# Patient Record
Sex: MALE | Race: OTHER | NOT HISPANIC OR LATINO | URBAN - METROPOLITAN AREA
[De-identification: names, ages, dates, MRNs, and addresses within clinical notes are randomized per-mention and may not be internally consistent; named-entity substitution may affect disease eponyms.]

---

## 2023-04-23 ENCOUNTER — INPATIENT (INPATIENT)
Facility: HOSPITAL | Age: 88
LOS: 0 days | Discharge: LEFT BEFORE DISCHARGE | DRG: 196 | End: 2023-04-24
Attending: INTERNAL MEDICINE | Admitting: INTERNAL MEDICINE
Payer: MEDICARE

## 2023-04-23 VITALS
SYSTOLIC BLOOD PRESSURE: 99 MMHG | DIASTOLIC BLOOD PRESSURE: 44 MMHG | WEIGHT: 100.09 LBS | RESPIRATION RATE: 24 BRPM | OXYGEN SATURATION: 74 % | HEIGHT: 67 IN | TEMPERATURE: 98 F | HEART RATE: 112 BPM

## 2023-04-23 DIAGNOSIS — R09.02 HYPOXEMIA: ICD-10-CM

## 2023-04-23 LAB
ALBUMIN SERPL ELPH-MCNC: 4.1 G/DL — SIGNIFICANT CHANGE UP (ref 3.5–5)
ALP SERPL-CCNC: 75 U/L — SIGNIFICANT CHANGE UP (ref 40–120)
ALT FLD-CCNC: 15 U/L DA — SIGNIFICANT CHANGE UP (ref 10–60)
ANION GAP SERPL CALC-SCNC: 11 MMOL/L — SIGNIFICANT CHANGE UP (ref 5–17)
ANION GAP SERPL CALC-SCNC: 8 MMOL/L — SIGNIFICANT CHANGE UP (ref 5–17)
APPEARANCE UR: CLEAR — SIGNIFICANT CHANGE UP
APPEARANCE UR: CLEAR — SIGNIFICANT CHANGE UP
AST SERPL-CCNC: 14 U/L — SIGNIFICANT CHANGE UP (ref 10–40)
BACTERIA # UR AUTO: ABNORMAL /HPF
BASOPHILS # BLD AUTO: 0.03 K/UL — SIGNIFICANT CHANGE UP (ref 0–0.2)
BASOPHILS NFR BLD AUTO: 0.4 % — SIGNIFICANT CHANGE UP (ref 0–2)
BILIRUB SERPL-MCNC: 0.4 MG/DL — SIGNIFICANT CHANGE UP (ref 0.2–1.2)
BILIRUB UR-MCNC: NEGATIVE — SIGNIFICANT CHANGE UP
BILIRUB UR-MCNC: NEGATIVE — SIGNIFICANT CHANGE UP
BUN SERPL-MCNC: 105 MG/DL — HIGH (ref 7–18)
BUN SERPL-MCNC: 92 MG/DL — HIGH (ref 7–18)
CALCIUM SERPL-MCNC: 9.3 MG/DL — SIGNIFICANT CHANGE UP (ref 8.4–10.5)
CALCIUM SERPL-MCNC: 9.5 MG/DL — SIGNIFICANT CHANGE UP (ref 8.4–10.5)
CHLORIDE SERPL-SCNC: 114 MMOL/L — HIGH (ref 96–108)
CHLORIDE SERPL-SCNC: 118 MMOL/L — HIGH (ref 96–108)
CO2 SERPL-SCNC: 16 MMOL/L — LOW (ref 22–31)
CO2 SERPL-SCNC: 19 MMOL/L — LOW (ref 22–31)
COLOR SPEC: YELLOW — SIGNIFICANT CHANGE UP
COLOR SPEC: YELLOW — SIGNIFICANT CHANGE UP
COMMENT - URINE: SIGNIFICANT CHANGE UP
CREAT ?TM UR-MCNC: 117 MG/DL — SIGNIFICANT CHANGE UP
CREAT ?TM UR-MCNC: 96 MG/DL — SIGNIFICANT CHANGE UP
CREAT SERPL-MCNC: 6.18 MG/DL — HIGH (ref 0.5–1.3)
CREAT SERPL-MCNC: 6.88 MG/DL — HIGH (ref 0.5–1.3)
D DIMER BLD IA.RAPID-MCNC: 543 NG/ML DDU — HIGH
DIFF PNL FLD: ABNORMAL
DIFF PNL FLD: ABNORMAL
EGFR: 7 ML/MIN/1.73M2 — LOW
EGFR: 8 ML/MIN/1.73M2 — LOW
EOSINOPHIL # BLD AUTO: 0.03 K/UL — SIGNIFICANT CHANGE UP (ref 0–0.5)
EOSINOPHIL NFR BLD AUTO: 0.4 % — SIGNIFICANT CHANGE UP (ref 0–6)
EPI CELLS # UR: ABNORMAL /HPF
GAS PNL BLDA: SIGNIFICANT CHANGE UP
GLUCOSE BLDC GLUCOMTR-MCNC: 120 MG/DL — HIGH (ref 70–99)
GLUCOSE SERPL-MCNC: 118 MG/DL — HIGH (ref 70–99)
GLUCOSE SERPL-MCNC: 119 MG/DL — HIGH (ref 70–99)
GLUCOSE UR QL: 100 MG/DL
GLUCOSE UR QL: 50 MG/DL
GRAN CASTS # UR COMP ASSIST: ABNORMAL /LPF
HCT VFR BLD CALC: 32.9 % — LOW (ref 39–50)
HGB BLD-MCNC: 10.6 G/DL — LOW (ref 13–17)
HYALINE CASTS # UR AUTO: ABNORMAL /LPF
IMM GRANULOCYTES NFR BLD AUTO: 0.7 % — SIGNIFICANT CHANGE UP (ref 0–0.9)
KETONES UR-MCNC: ABNORMAL
KETONES UR-MCNC: ABNORMAL
LACTATE SERPL-SCNC: 2.2 MMOL/L — HIGH (ref 0.7–2)
LACTATE SERPL-SCNC: 2.4 MMOL/L — HIGH (ref 0.7–2)
LEUKOCYTE ESTERASE UR-ACNC: NEGATIVE — SIGNIFICANT CHANGE UP
LEUKOCYTE ESTERASE UR-ACNC: NEGATIVE — SIGNIFICANT CHANGE UP
LYMPHOCYTES # BLD AUTO: 0.99 K/UL — LOW (ref 1–3.3)
LYMPHOCYTES # BLD AUTO: 13.3 % — SIGNIFICANT CHANGE UP (ref 13–44)
MCHC RBC-ENTMCNC: 31.5 PG — SIGNIFICANT CHANGE UP (ref 27–34)
MCHC RBC-ENTMCNC: 32.2 GM/DL — SIGNIFICANT CHANGE UP (ref 32–36)
MCV RBC AUTO: 97.6 FL — SIGNIFICANT CHANGE UP (ref 80–100)
MONOCYTES # BLD AUTO: 0.51 K/UL — SIGNIFICANT CHANGE UP (ref 0–0.9)
MONOCYTES NFR BLD AUTO: 6.9 % — SIGNIFICANT CHANGE UP (ref 2–14)
MRSA PCR RESULT.: SIGNIFICANT CHANGE UP
NEUTROPHILS # BLD AUTO: 5.81 K/UL — SIGNIFICANT CHANGE UP (ref 1.8–7.4)
NEUTROPHILS NFR BLD AUTO: 78.3 % — HIGH (ref 43–77)
NITRITE UR-MCNC: NEGATIVE — SIGNIFICANT CHANGE UP
NITRITE UR-MCNC: NEGATIVE — SIGNIFICANT CHANGE UP
NRBC # BLD: 0 /100 WBCS — SIGNIFICANT CHANGE UP (ref 0–0)
NT-PROBNP SERPL-SCNC: 708 PG/ML — HIGH (ref 0–450)
PH UR: 5 — SIGNIFICANT CHANGE UP (ref 5–8)
PH UR: 5 — SIGNIFICANT CHANGE UP (ref 5–8)
PLATELET # BLD AUTO: 258 K/UL — SIGNIFICANT CHANGE UP (ref 150–400)
POTASSIUM SERPL-MCNC: 5.3 MMOL/L — SIGNIFICANT CHANGE UP (ref 3.5–5.3)
POTASSIUM SERPL-MCNC: 5.9 MMOL/L — HIGH (ref 3.5–5.3)
POTASSIUM SERPL-SCNC: 5.3 MMOL/L — SIGNIFICANT CHANGE UP (ref 3.5–5.3)
POTASSIUM SERPL-SCNC: 5.9 MMOL/L — HIGH (ref 3.5–5.3)
PROT ?TM UR-MCNC: 98 MG/DL — HIGH (ref 0–12)
PROT SERPL-MCNC: 9.5 G/DL — HIGH (ref 6–8.3)
PROT UR-MCNC: 100 MG/DL
PROT UR-MCNC: 100 MG/DL
PSA FLD-MCNC: 0.99 NG/ML — SIGNIFICANT CHANGE UP (ref 0–4)
RAPID RVP RESULT: SIGNIFICANT CHANGE UP
RBC # BLD: 3.37 M/UL — LOW (ref 4.2–5.8)
RBC # FLD: 14 % — SIGNIFICANT CHANGE UP (ref 10.3–14.5)
RBC CASTS # UR COMP ASSIST: ABNORMAL /HPF (ref 0–2)
S AUREUS DNA NOSE QL NAA+PROBE: SIGNIFICANT CHANGE UP
SARS-COV-2 RNA SPEC QL NAA+PROBE: SIGNIFICANT CHANGE UP
SODIUM SERPL-SCNC: 141 MMOL/L — SIGNIFICANT CHANGE UP (ref 135–145)
SODIUM SERPL-SCNC: 145 MMOL/L — SIGNIFICANT CHANGE UP (ref 135–145)
SODIUM UR-SCNC: 34 MMOL/L — SIGNIFICANT CHANGE UP
SODIUM UR-SCNC: 44 MMOL/L — SIGNIFICANT CHANGE UP
SP GR SPEC: 1.01 — SIGNIFICANT CHANGE UP (ref 1.01–1.02)
SP GR SPEC: 1.01 — SIGNIFICANT CHANGE UP (ref 1.01–1.02)
TROPONIN I, HIGH SENSITIVITY RESULT: 27.1 NG/L — SIGNIFICANT CHANGE UP
UROBILINOGEN FLD QL: NEGATIVE — SIGNIFICANT CHANGE UP
UROBILINOGEN FLD QL: NEGATIVE — SIGNIFICANT CHANGE UP
WBC # BLD: 7.42 K/UL — SIGNIFICANT CHANGE UP (ref 3.8–10.5)
WBC # FLD AUTO: 7.42 K/UL — SIGNIFICANT CHANGE UP (ref 3.8–10.5)
WBC UR QL: SIGNIFICANT CHANGE UP /HPF (ref 0–5)

## 2023-04-23 PROCEDURE — 71045 X-RAY EXAM CHEST 1 VIEW: CPT | Mod: 26

## 2023-04-23 PROCEDURE — 74176 CT ABD & PELVIS W/O CONTRAST: CPT | Mod: 26

## 2023-04-23 PROCEDURE — 93010 ELECTROCARDIOGRAM REPORT: CPT

## 2023-04-23 PROCEDURE — 99285 EMERGENCY DEPT VISIT HI MDM: CPT

## 2023-04-23 PROCEDURE — 71250 CT THORAX DX C-: CPT | Mod: 26

## 2023-04-23 RX ORDER — DEXTROSE 50 % IN WATER 50 %
50 SYRINGE (ML) INTRAVENOUS ONCE
Refills: 0 | Status: COMPLETED | OUTPATIENT
Start: 2023-04-23 | End: 2023-04-23

## 2023-04-23 RX ORDER — CHLORHEXIDINE GLUCONATE 213 G/1000ML
1 SOLUTION TOPICAL
Refills: 0 | Status: DISCONTINUED | OUTPATIENT
Start: 2023-04-23 | End: 2023-04-24

## 2023-04-23 RX ORDER — AZITHROMYCIN 500 MG/1
500 TABLET, FILM COATED ORAL EVERY 24 HOURS
Refills: 0 | Status: DISCONTINUED | OUTPATIENT
Start: 2023-04-23 | End: 2023-04-24

## 2023-04-23 RX ORDER — HEPARIN SODIUM 5000 [USP'U]/ML
5000 INJECTION INTRAVENOUS; SUBCUTANEOUS EVERY 12 HOURS
Refills: 0 | Status: DISCONTINUED | OUTPATIENT
Start: 2023-04-23 | End: 2023-04-24

## 2023-04-23 RX ORDER — CEFTRIAXONE 500 MG/1
1000 INJECTION, POWDER, FOR SOLUTION INTRAMUSCULAR; INTRAVENOUS EVERY 24 HOURS
Refills: 0 | Status: DISCONTINUED | OUTPATIENT
Start: 2023-04-23 | End: 2023-04-24

## 2023-04-23 RX ORDER — SODIUM CHLORIDE 9 MG/ML
500 INJECTION, SOLUTION INTRAVENOUS ONCE
Refills: 0 | Status: COMPLETED | OUTPATIENT
Start: 2023-04-23 | End: 2023-04-23

## 2023-04-23 RX ORDER — INSULIN HUMAN 100 [IU]/ML
5 INJECTION, SOLUTION SUBCUTANEOUS ONCE
Refills: 0 | Status: COMPLETED | OUTPATIENT
Start: 2023-04-23 | End: 2023-04-23

## 2023-04-23 RX ORDER — SODIUM CHLORIDE 9 MG/ML
1000 INJECTION INTRAMUSCULAR; INTRAVENOUS; SUBCUTANEOUS ONCE
Refills: 0 | Status: COMPLETED | OUTPATIENT
Start: 2023-04-23 | End: 2023-04-23

## 2023-04-23 RX ORDER — CALCIUM GLUCONATE 100 MG/ML
2 VIAL (ML) INTRAVENOUS ONCE
Refills: 0 | Status: COMPLETED | OUTPATIENT
Start: 2023-04-23 | End: 2023-04-23

## 2023-04-23 RX ADMIN — CHLORHEXIDINE GLUCONATE 1 APPLICATION(S): 213 SOLUTION TOPICAL at 16:28

## 2023-04-23 RX ADMIN — Medication 50 MILLILITER(S): at 13:43

## 2023-04-23 RX ADMIN — Medication 200 GRAM(S): at 13:56

## 2023-04-23 RX ADMIN — SODIUM CHLORIDE 1000 MILLILITER(S): 9 INJECTION INTRAMUSCULAR; INTRAVENOUS; SUBCUTANEOUS at 10:55

## 2023-04-23 RX ADMIN — SODIUM CHLORIDE 1000 MILLILITER(S): 9 INJECTION, SOLUTION INTRAVENOUS at 14:04

## 2023-04-23 RX ADMIN — INSULIN HUMAN 5 UNIT(S): 100 INJECTION, SOLUTION SUBCUTANEOUS at 13:43

## 2023-04-23 RX ADMIN — HEPARIN SODIUM 5000 UNIT(S): 5000 INJECTION INTRAVENOUS; SUBCUTANEOUS at 18:44

## 2023-04-23 RX ADMIN — AZITHROMYCIN 255 MILLIGRAM(S): 500 TABLET, FILM COATED ORAL at 16:24

## 2023-04-23 RX ADMIN — CEFTRIAXONE 100 MILLIGRAM(S): 500 INJECTION, POWDER, FOR SOLUTION INTRAMUSCULAR; INTRAVENOUS at 14:06

## 2023-04-23 NOTE — ED ADULT TRIAGE NOTE - CHIEF COMPLAINT QUOTE
SON REPORTS PT HAS DIFFICULTY BREATHING X 4 DAYS. + COUGH. EMS REPORTS UNABLE TO OBTAIN O2SAT IN THE FIELD

## 2023-04-23 NOTE — ED ADULT NURSE NOTE - OBJECTIVE STATEMENT
Pt presents to ED for SOB x 4 days. Received pt on O2 @ 15L/min via non rebreather mask. Pt denies any pain.

## 2023-04-23 NOTE — PATIENT PROFILE ADULT - FALL HARM RISK - HARM RISK INTERVENTIONS
Assistance with ambulation/Assistance OOB with selected safe patient handling equipment/Communicate Risk of Fall with Harm to all staff/Monitor gait and stability/Provide patient with walking aids - walker, cane, crutches/Reinforce activity limits and safety measures with patient and family/Tailored Fall Risk Interventions/Use of alarms - bed, chair and/or voice tab/Visual Cue: Yellow wristband and red socks/Bed in lowest position, wheels locked, appropriate side rails in place/Call bell, personal items and telephone in reach/Instruct patient to call for assistance before getting out of bed or chair/Non-slip footwear when patient is out of bed/[place] to call system/Physically safe environment - no spills, clutter or unnecessary equipment/Purposeful Proactive Rounding/Room/bathroom lighting operational, light cord in reach

## 2023-04-23 NOTE — CONSULT NOTE ADULT - SUBJECTIVE AND OBJECTIVE BOX
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 As per patient he does not take any medications, or have any medical history. As per ED attending, pt with productive cough to the point that he is unable to take a breath. Pt denies fever, chills, chest pain. In ED, pt reports wanting to urinate but is unable to.  Patient is endorsing weight loss over the last few weeks (2023 13:33)    INTERVAL HPI:  Called to evaluate this 88yo M admitted for respiratory distress with finding of trace Right pneumothorax and pneumomediastinum on CT scan.  Pt also in urinary retention on admission. Pt states he currently has no SOB.  He denies chest pain. He is a former smoker (quit 50 years ago). He denies history of COPD, emphysema or bronchitis. No previous surgeries. No recent trauma.       PAST MEDICAL & SURGICAL HISTORY:  No pertinent past medical history          Allergies    No Known Allergies    Intolerances        MEDICATIONS  (STANDING):  azithromycin  IVPB 500 milliGRAM(s) IV Intermittent every 24 hours  cefTRIAXone   IVPB 1000 milliGRAM(s) IV Intermittent every 24 hours  chlorhexidine 2% Cloths 1 Application(s) Topical <User Schedule>  heparin   Injectable 5000 Unit(s) SubCutaneous every 12 hours    MEDICATIONS  (PRN):      Vital Signs Last 24 Hrs  T(C): 35.7 (2023 16:00), Max: 36.7 (2023 14:00)  T(F): 96.3 (2023 16:00), Max: 98 (2023 14:00)  HR: 98 (2023 16:00) (93 - 115)  BP: 134/79 (2023 16:00) (99/44 - 134/79)  BP(mean): 91 (2023 16:00) (91 - 97)  RR: 21 (2023 16:00) (19 - 24)  SpO2: 96% (2023 16:00) (74% - 99%)    Parameters below as of 2023 16:00  Patient On (Oxygen Delivery Method): room air        Physical Exam:  Gen: awake, alert oriented NAD, cachetic  HEENT: anicteric  Chest: equal chest rise, no accessory muscle use  Abd: soft NT ND  Pelvic: Lomax catheter in place with clear urine  Ext: warm to touch no c/c/e    Labs:                          10.6   7.42  )-----------( 258      ( 2023 10:30 )             32.9         145  |  118<H>  |  92<H>  ----------------------------<  119<H>  5.3   |  19<L>  |  6.18<H>    Ca    9.3      2023 16:10    TPro  9.5<H>  /  Alb  4.1  /  TBili  0.4  /  DBili  x   /  AST  14  /  ALT  15  /  AlkPhos  75        Urinalysis Basic - ( 2023 14:10 )    Color: Yellow / Appearance: Clear / S.015 / pH: x  Gluc: x / Ketone: Small  / Bili: Negative / Urobili: Negative   Blood: x / Protein: 100 mg/dL / Nitrite: Negative   Leuk Esterase: Negative / RBC: 2-5 /HPF / WBC 0-2 /HPF   Sq Epi: x / Non Sq Epi: x / Bacteria: Few /HPF        Radiological Exams:  < from: CT Chest No Cont (23 @ 14:35) >  IMPRESSION:  Trace right pneumothorax and pneumomediastinum.    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    < end of copied text >  
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 As per patient he does not take any medications, or have any medical history. As per ED attending, pt with productive cough to the point that he is unable to take a breath. Pt denies fever, chills, chest pain. In ED, pt reports wanting to urinate but is unable to.  Patient is endorsing weight loss over the last few weeks (2023 13:33)      PAST MEDICAL & SURGICAL HISTORY:  No pertinent past medical history            Allergies:  No Known Allergies      Home Medications:   azithromycin  IVPB 500 milliGRAM(s) IV Intermittent every 24 hours  cefTRIAXone   IVPB 1000 milliGRAM(s) IV Intermittent every 24 hours  chlorhexidine 2% Cloths 1 Application(s) Topical <User Schedule>  heparin   Injectable 5000 Unit(s) SubCutaneous every 12 hours      Hospital Medications:   MEDICATIONS  (STANDING):  azithromycin  IVPB 500 milliGRAM(s) IV Intermittent every 24 hours  cefTRIAXone   IVPB 1000 milliGRAM(s) IV Intermittent every 24 hours  chlorhexidine 2% Cloths 1 Application(s) Topical <User Schedule>  heparin   Injectable 5000 Unit(s) SubCutaneous every 12 hours      SOCIAL HISTORY:  Denies ETOh, Smoking,     Family History:  FAMILY HISTORY:      ROS:  Limited  Alert and awake in mild distress on nasal cannula oxygen  Mild SOB and dry cough  Poor oral intake/ Fatigue  Difficulty swallowing  Son at bedside     VITALS:  T(F): 96.3 (23 @ 16:00), Max: 98 (23 @ 14:00)  HR: 98 (23 @ 16:00)  BP: 134/79 (23 @ 16:00)  RR: 21 (23 @ 16:00)  SpO2: 96% (23 @ 16:00)  Wt(kg): --     @ 07:01  -   @ 16:57  --------------------------------------------------------  IN: 850 mL / OUT: 150 mL / NET: 700 mL      Height (cm): 170.2 ( @ 10:08)  Weight (kg): 41.3 ( @ 14:50)  BMI (kg/m2): 14.3 ( @ 14:50)  BSA (m2): 1.45 ( @ 14:50)  CAPILLARY BLOOD GLUCOSE      POCT Blood Glucose.: 120 mg/dL (2023 14:21)  POCT Blood Glucose.: 84 mg/dL (2023 10:29)        PHYSICAL EXAM:  GENERAL: Alert, awake, cachectic appearing male oriented x2-3   HEENT: MAGALYS, EOMI, neck supple, no JVP, poor dentitions  CHEST/LUNG: Bilateral decreased breath sounds, bibasilar minimal rhonchi and crepitations, no wheezing  HEART: Regular rate and rhythm, CHANTAL II/VI at LPSB, no gallops, no rub   ABDOMEN: Soft, nontender, non distended, bowel sounds present, no palpable organomegaly  : No flank or supra pubic tenderness.  EXTREMITIES: Peripheral pulses are palpable, no pedal edema,  Musculoskeletal: No joint or spinal tenderness  Neurology: AAOx2-3, no focal neurological deficit, able to move all 4 extremities  SKIN: No rash or skin lesion    LABS:      141  |  114<H>  |  105<H>  ----------------------------<  118<H>  5.9<H>   |  16<L>  |  6.88<H>    Ca    9.5      2023 10:30    TPro  9.5<H>  /  Alb  4.1  /  TBili  0.4  /  DBili      /  AST  14  /  ALT  15  /  AlkPhos  75      Creatinine Trend: 6.88 <--                        10.6   7.42  )-----------( 258      ( 2023 10:30 )             32.9     Urine Studies:  Urinalysis Basic - ( 2023 14:10 )    Color: Yellow / Appearance: Clear / S.015 / pH:   Gluc:  / Ketone: Small  / Bili: Negative / Urobili: Negative   Blood:  / Protein: 100 mg/dL / Nitrite: Negative   Leuk Esterase: Negative / RBC: 2-5 /HPF / WBC 0-2 /HPF   Sq Epi:  / Non Sq Epi:  / Bacteria: Few /HPF      Creatinine, Random Urine: 117 mg/dL ( @ 14:10)  Sodium, Random Urine: 34 mmol/L ( @ 14:10)      RADIOLOGY & ADDITIONAL STUDIES:  rad< from: CT Abdomen and Pelvis No Cont (23 @ 14:35) >    ACC: [id number] EXAM:  CT CHEST   ORDERED BY: TYSON GTZ     ACC: [id number] EXAM:  CT ABDOMEN AND PELVIS   ORDERED BY: TYSON GTZ     PROCEDURE DATE:  2023          INTERPRETATION:  CLINICAL INFORMATION: Hypoxia. Respiratory failure.   Acuterenal failure.    COMPARISON: None.    CONTRAST/COMPLICATIONS:  IV Contrast: NONE  Oral Contrast: NONE  Complications: None reported at time of study completion    PROCEDURE:  CT of the Chest, Abdomen and Pelvis was performed.  Sagittal and coronal reformats were performed.    FINDINGS:  CHEST:  LUNGS AND LARGE AIRWAYS: Diffuse fibrotic lung changes with bilateral   peripheral and basilar reticular opacities and honeycomb cysts.  PLEURA: Trace right pneumothorax.  VESSELS: Atherosclerotic changesof the aorta.  HEART: Heart size is normal. No pericardial effusion.  MEDIASTINUM AND SHERITA: Pneumomediastinum. No lymphadenopathy.  CHEST WALL AND LOWER NECK: Within normal limits.    ABDOMEN AND PELVIS:  LIVER: Within normal limits.  BILE DUCTS: Normal caliber.  GALLBLADDER: Within normal limits.  SPLEEN: Within normal limits.  PANCREAS: Within normal limits.  ADRENALS: Within normal limits.  KIDNEYS/URETERS: Within normal limits.    BLADDER: Contains air and a Lomax catheter balloon.  REPRODUCTIVE ORGANS: Prostate is enlarged.    BOWEL: No bowel obstruction.  PERITONEUM: No ascites.  VESSELS: Atherosclerotic changes.  RETROPERITONEUM/LYMPH NODES: No lymphadenopathy.  ABDOMINAL WALL: Within normal limits.  BONES: Degenerative changes.    IMPRESSION:  Trace right pneumothorax and pneumomediastinum.    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    Findings were discussed with Dr. Duran on 2023 at 3:05 PM by Dr. Leroy   with read back confirmation.    --- End of Report ---            SYDNI MCCALL; Attending Radiologist  This document has been electronically signed. 2023  3:05PM    < end of copied text >  < from: CT Chest No Cont (23 @ 14:35) >    ACC: [id number] EXAM:  CT CHEST   ORDERED BY: TYSON GTZ     ACC: [id number] EXAM:  CT ABDOMEN AND PELVIS   ORDERED BY: TYSON GTZ     PROCEDURE DATE:  2023          INTERPRETATION:  CLINICAL INFORMATION: Hypoxia. Respiratory failure.   Acuterenal failure.    COMPARISON: None.    CONTRAST/COMPLICATIONS:  IV Contrast: NONE  Oral Contrast: NONE  Complications: None reported at time of study completion    PROCEDURE:  CT of the Chest, Abdomen and Pelvis was performed.  Sagittal and coronal reformats were performed.    FINDINGS:  CHEST:  LUNGS AND LARGE AIRWAYS: Diffuse fibrotic lung changes with bilateral   peripheral and basilar reticular opacities and honeycomb cysts.  PLEURA: Trace right pneumothorax.  VESSELS: Atherosclerotic changesof the aorta.  HEART: Heart size is normal. No pericardial effusion.  MEDIASTINUM AND SHERITA: Pneumomediastinum. No lymphadenopathy.  CHEST WALL AND LOWER NECK: Within normal limits.    ABDOMEN AND PELVIS:  LIVER: Within normal limits.  BILE DUCTS: Normal caliber.  GALLBLADDER: Within normal limits.  SPLEEN: Within normal limits.  PANCREAS: Within normal limits.  ADRENALS: Within normal limits.  KIDNEYS/URETERS: Within normal limits.    BLADDER: Contains air and a Lomax catheter balloon.  REPRODUCTIVE ORGANS: Prostate is enlarged.    BOWEL: No bowel obstruction.  PERITONEUM: No ascites.  VESSELS: Atherosclerotic changes.  RETROPERITONEUM/LYMPH NODES: No lymphadenopathy.  ABDOMINAL WALL: Within normal limits.  BONES: Degenerative changes.    IMPRESSION:  Trace right pneumothorax and pneumomediastinum.    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    Findings were discussed with Dr. Duran on 2023 at 3:05 PM by Dr. Leroy   with read back confirmation.    --- End of Report ---            SYDNI MCCALL; Attending Radiologist  This document has been electronically signed. 2023  3:05PM    < end of copied text >  < from: Xray Chest 1 View-PORTABLE IMMEDIATE (23 @ 11:06) >    ACC: [id number] EXAM:  XR CHEST PORTABLE IMMED 1V   ORDERED BY: CAMERON CARDONA     PROCEDURE DATE:  2023          INTERPRETATION:  Chest portable    CLINICAL HISTORY: Shortness of breath    COMPARISON: None available    IMPRESSION: Normal heart and mediastinum.    Diffuse reticulonodular interstitial changes are apparent. Pleural   parenchymal fibronodular residuals are seen at the apices bilaterally.   The angles are sharp. No acute bony abnormality is seen.    The findings are consistent with acute and/or chronic change. Recommend   comparison to prior radiographs for further delineation of this. If not   available then an interval follow-up radiographs may be helpful to   exclude superimposed acute on chronic change    --- End ofReport ---            DAVID HAMLIN MD; Attending Radiologist  This document has been electronically signed. 2023 11:21AM    < end of copied text >    EKG findings reviewed.    Imaging Personally Reviewed:  [x] YES  [ ] NO    Consultant(s) and primary physician Notes Reviewed:  [x] YES  [ ] NO    Care Discussed with Primary team/ Consultants/Other Providers [x] YES  [ ] NO

## 2023-04-23 NOTE — H&P ADULT - HISTORY OF PRESENT ILLNESS
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 As per patient he does not take any medications, or have any medical history. As per ED attending, pt with productive cough to the point that he is unable to take a breath. Pt denies fever, chills, chest pain. In ED, pt reports wanting to urinate but is unable to.  Patient is endorsing weight loss over the last few weeks

## 2023-04-23 NOTE — ED PROVIDER NOTE - CLINICAL SUMMARY MEDICAL DECISION MAKING FREE TEXT BOX
87-year-old male no significant past medical history presents with shortness of breath and cough for the past 4 days patient hypoxic to 74% on room air in ED.  Nonrebreather placed lungs grossly clear on exam no lower extremity edema patient with dry mucous membranes clinically dehydrated IV fluids labs chest x-ray viral swab reassess

## 2023-04-23 NOTE — H&P ADULT - ASSESSMENT
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    Assessment:  #Suspected PNA  #Acute Renal Failure  #Anemia    Plan:  Neuro:  - No issues    Cardiovascular:  - No issues    Pulmonary:   #Suspected PNA  - pt placed initially on BIPAP  - saturating well on 6L NC without signs of respiratory distress    Gastrointestinal:  #No issues    Renal:  #Acute Renal Failure  - baseline SCr wnl as per family  - P/w Cr 6 with hyperkalemia  - give 5u insulin cocktail  - Place bajwa catheter  - obtain ua, urine lytes    Infectious Diseases:  #Suspected PNA  - RLL decreased breath sounds  - CXR negative  -Start azithromycin 500mg qd + rocephin 1g qd  - Send strep ag, legionella, RVP, procalcitonin, mycoplasma igm  - tylenol prn    Heme/onc:   #Anemia  Hb 10.6 without signs of active bleeding  monitor hb    Endo:   No issues    Skin/ catheter:   Bajwa - 4/23 placed in ED  peripheral IVs    Prophylaxis:   Heparin 5000sq q12    Goals of Care:   FULL CODE    Dispo:  Admit to ICU   87M, from home, independent aox3 at baseline, 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    Assessment:  #Suspected PNA  #Acute Renal Failure  #NAGMA  #Anemia    Plan:  Neuro:  - No issues    Cardiovascular:  - No issues    Pulmonary:   #Suspected PNA  - pt placed initially on BIPAP  - saturating well on 6L NC without signs of respiratory distress    Gastrointestinal:  #No issues    Renal:  #Acute Renal Failure  - baseline SCr wnl as per family  - P/w Cr 6 with hyperkalemia  - give 5u insulin cocktail  - Place bajwa catheter  - obtain ua, urine lytes    #NAGMA  - HCO3 16 likely due to acute renal failure  - compensatory respiratory alkalosis  - off BIPAP will repeat ABG    Infectious Diseases:  #Suspected PNA  - RLL decreased breath sounds  - CXR negative  -Start azithromycin 500mg qd + rocephin 1g qd  - Send strep ag, legionella, RVP, procalcitonin, mycoplasma igm  - tylenol prn    Heme/onc:   #Anemia  Hb 10.6 without signs of active bleeding  monitor hb    Endo:   No issues    Skin/ catheter:   Bajwa - 4/23 placed in ED  peripheral IVs    Prophylaxis:   Heparin 5000sq q12    Goals of Care:   FULL CODE    Dispo:  Admit to ICU

## 2023-04-23 NOTE — H&P ADULT - NSHPREVIEWOFSYSTEMS_GEN_ALL_CORE
CONSTITUTIONAL: No fever, (+) weight loss, (+) fatigue  RESPIRATORY: No hemoptysis; (+) shortness of breath, (+) cough  CARDIOVASCULAR: No chest pain, palpitations, dizziness, or leg swelling  GASTROINTESTINAL: No abdominal pain. No nausea, vomiting, or hematemesis; No diarrhea or constipation. No melena or hematochezia.  GENITOURINARY: No dysuria or hematuria, urinary frequency  NEUROLOGICAL: No headaches, memory loss, loss of strength, numbness, or tremors  ENDOCRINE: No polyuria, polydipsia, or heat/cold intolerance  MUSKULOSKELETAL: No muscle aches, joint pains  HEME: no easy bruisability, no tender or enlarged lymph nodes  SKIN: No itching, burning, rashes, or lesions .

## 2023-04-23 NOTE — ED PROVIDER NOTE - PROGRESS NOTE DETAILS
Pt still hypoxic on NRB.  Discussed possible intubation with patient and son at bedside who said patient would not want to be intubated.  Will start Bipap, ABG ordered. pt accepted to ICU

## 2023-04-23 NOTE — CONSULT NOTE ADULT - ASSESSMENT
88yo M with pneumomediastinum possibly secondary to fibrotic lung disease.   1. No acute thoracic surgery intervention  2. Continue O2 therapy  3. D/w Dr. Teran. 
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 As per patient he does not take any medications, or have any medical history. As per ED attending, pt with productive cough to the point that he is unable to take a breath. Nephrology consult called for SANDI    Assessment:  1) Non oliguric SANDI with unknown prior baseline renal function with S cr 6.9 likely pre-renal/dehydration/poor oral intake/sepsis due to PNA vs ATN from renal hypoperfusion/rhabdomyolysis vs obstructive uropathy  2) NAGMA  3) Hyperkalemia  4) Acute hypoxic respiratory failure on nasal cannula oxygen  5) ?Pneumothorax/Hypoxemia/pulmonary fibrosis/PNA  6) Cachexia/poor oral intake/?dysphagia r/o malignancy  7) Anemia of chronic illness  8) Renal osteodystrophy    Recommend:  Critically ill in ICU on nasal cannula oxygen  Strict I/o  Avoid Nephrotoxic agents  S cr down trending from 6.9-->6.1  Monitor BMP and electrolytes every 8 to 12 hrly  NPO/Speech and bedside swallow evaluation  Check urinalysis, urine culture, Urine electrolytes as per ordered  Lomax's catheter  Septic work up  Maintain MAP>65-70 mm Hg  Adjust antibiotics as per renal dose adjustment  Oxygen supplement  Pulmonary follow up for Pneumothorax/pneumomediastinum  Work up for SANDI/CKD/Anemia as per ordered  Nutritional consult  Monitor for hypoxemia  Consider gentle IV hydration with NS@ 50-60cc/hr for 10-12 hours with watchful respiratory status  Volume status dry appearing with mild hyperkalemia  NO urgent need for RRT/HD  Obtain prior blood work if available  Further work up per primary/ICU team  Plan of care discussed with ICU/primary team/Family son at bedside  Will follow

## 2023-04-23 NOTE — ED PROVIDER NOTE - OBJECTIVE STATEMENT
87-year-old male brought in by EMS for shortness of breath and cough for the past 4 days.  As per EMS they are unable to get his saturation in the field.  Son is at bedside to provide collateral information.  Family denies any past medical history.  Patient was functional and working as of last week.  For the last 4 days patient been having worsening shortness of breath with cough productive sputum to the point today where patient was gasping for air.  Family denies fever no chest pain no lower extremity swelling no abdominal pain no nausea no vomiting no diarrhea.  Patient is endorsing difficulty swallowing as well as weight loss over the last few weeks

## 2023-04-23 NOTE — H&P ADULT - ATTENDING COMMENTS
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 CT show pulmonary fibrosis with pneumomediastinum and small right apical PTX . He was initially started on BiPaP changed to NC. NAGMA due to SANDI     Assessment:    - Acute Hypoxic Resp Failure   - Pneumomediastinum   - Small right apical PTX   - Lung Fibrosis   - Suspected PNA  - Acute Renal Failure  - NAGMA  - Anemia  - SANDI       Sugg    - Admit to ICU   - No  sedation   - O2 supp as needed   - D/C BiPaP   - Antibx   - F/U cultures   - Duonebs q6h   - Solumedrol   - Serial CXR too evaluate PTX   - Thoracic eval   - NPO   - IV antibx   - F/U cultures   - RVP and covid-19 neg   - PSA  - Monitor renal fx  - Neph eval   - SANDI work up , serum and urine lytes   - Renal sono   - Lomax cath   - Bicarb ivp   - DVT GI prophy    discussed with james Penaloza at [facility] in ER an BOBBY rodriguez

## 2023-04-23 NOTE — H&P ADULT - NSHPPHYSICALEXAM_GEN_ALL_CORE
General - NAD, sitting up in bed, well groomed  Eyes - PERRLA, EOM intact  ENT - Nonicteric sclerae, PERRLA, EOMI. Oropharynx clear. Moist mucous membranes. Conjunctivae appear well perfused.   Neck - No noticeable or palpable swelling, redness or rash around throat or on face  Lymph Nodes - No lymphadenopathy  Cardiovascular - RRR no m/r/g, no JVD, no carotid bruits  Lungs - (+) decreased breath sounds RLL  Skin - No rashes, skin warm and dry, no erythematous areas  Abdomen - Normal bowel sounds, abdomen soft and nontender  Rectal – Rectal exam not performed since no symptoms indicated blood loss.  Extremities - No edema, cyanosis or clubbing  Musculoskeletal - 5/5 strength, normal range of motion, no swollen or erythematous joints.  Neuro– Alert and oriented x 3, CN 2-12 grossly intact.

## 2023-04-24 VITALS
DIASTOLIC BLOOD PRESSURE: 56 MMHG | SYSTOLIC BLOOD PRESSURE: 117 MMHG | RESPIRATION RATE: 18 BRPM | HEART RATE: 92 BPM | OXYGEN SATURATION: 99 %

## 2023-04-24 LAB
ALBUMIN SERPL ELPH-MCNC: 3.3 G/DL — LOW (ref 3.5–5)
ALP SERPL-CCNC: 61 U/L — SIGNIFICANT CHANGE UP (ref 40–120)
ALT FLD-CCNC: 14 U/L DA — SIGNIFICANT CHANGE UP (ref 10–60)
ANION GAP SERPL CALC-SCNC: 9 MMOL/L — SIGNIFICANT CHANGE UP (ref 5–17)
AST SERPL-CCNC: 19 U/L — SIGNIFICANT CHANGE UP (ref 10–40)
BASOPHILS # BLD AUTO: 0.03 K/UL — SIGNIFICANT CHANGE UP (ref 0–0.2)
BASOPHILS NFR BLD AUTO: 0.3 % — SIGNIFICANT CHANGE UP (ref 0–2)
BILIRUB SERPL-MCNC: 0.3 MG/DL — SIGNIFICANT CHANGE UP (ref 0.2–1.2)
BUN SERPL-MCNC: 86 MG/DL — HIGH (ref 7–18)
CALCIUM SERPL-MCNC: 8.8 MG/DL — SIGNIFICANT CHANGE UP (ref 8.4–10.5)
CALCIUM SERPL-MCNC: 9.2 MG/DL — SIGNIFICANT CHANGE UP (ref 8.4–10.5)
CHLORIDE SERPL-SCNC: 117 MMOL/L — HIGH (ref 96–108)
CK SERPL-CCNC: 392 U/L — HIGH (ref 35–232)
CO2 SERPL-SCNC: 16 MMOL/L — LOW (ref 22–31)
CREAT SERPL-MCNC: 5.75 MG/DL — HIGH (ref 0.5–1.3)
EGFR: 9 ML/MIN/1.73M2 — LOW
EOSINOPHIL # BLD AUTO: 0.16 K/UL — SIGNIFICANT CHANGE UP (ref 0–0.5)
EOSINOPHIL NFR BLD AUTO: 1.9 % — SIGNIFICANT CHANGE UP (ref 0–6)
FERRITIN SERPL-MCNC: 265 NG/ML — SIGNIFICANT CHANGE UP (ref 30–400)
GLUCOSE SERPL-MCNC: 100 MG/DL — HIGH (ref 70–99)
HAV IGM SER-ACNC: SIGNIFICANT CHANGE UP
HBV CORE IGM SER-ACNC: SIGNIFICANT CHANGE UP
HBV SURFACE AG SER-ACNC: SIGNIFICANT CHANGE UP
HCT VFR BLD CALC: 25.6 % — LOW (ref 39–50)
HCV AB S/CO SERPL IA: 0.11 S/CO — SIGNIFICANT CHANGE UP (ref 0–0.99)
HCV AB SERPL-IMP: SIGNIFICANT CHANGE UP
HGB BLD-MCNC: 8.4 G/DL — LOW (ref 13–17)
HIV 1+2 AB+HIV1 P24 AG SERPL QL IA: SIGNIFICANT CHANGE UP
IMM GRANULOCYTES NFR BLD AUTO: 0.3 % — SIGNIFICANT CHANGE UP (ref 0–0.9)
IRON SATN MFR SERPL: 16 % — LOW (ref 20–55)
IRON SATN MFR SERPL: 42 UG/DL — LOW (ref 65–170)
LACTATE SERPL-SCNC: 2.3 MMOL/L — HIGH (ref 0.7–2)
LEGIONELLA AG UR QL: NEGATIVE — SIGNIFICANT CHANGE UP
LYMPHOCYTES # BLD AUTO: 0.98 K/UL — LOW (ref 1–3.3)
LYMPHOCYTES # BLD AUTO: 11.4 % — LOW (ref 13–44)
MAGNESIUM SERPL-MCNC: 2.2 MG/DL — SIGNIFICANT CHANGE UP (ref 1.6–2.6)
MCHC RBC-ENTMCNC: 31.7 PG — SIGNIFICANT CHANGE UP (ref 27–34)
MCHC RBC-ENTMCNC: 32.8 GM/DL — SIGNIFICANT CHANGE UP (ref 32–36)
MCV RBC AUTO: 96.6 FL — SIGNIFICANT CHANGE UP (ref 80–100)
MONOCYTES # BLD AUTO: 0.79 K/UL — SIGNIFICANT CHANGE UP (ref 0–0.9)
MONOCYTES NFR BLD AUTO: 9.2 % — SIGNIFICANT CHANGE UP (ref 2–14)
NEUTROPHILS # BLD AUTO: 6.64 K/UL — SIGNIFICANT CHANGE UP (ref 1.8–7.4)
NEUTROPHILS NFR BLD AUTO: 76.9 % — SIGNIFICANT CHANGE UP (ref 43–77)
NRBC # BLD: 0 /100 WBCS — SIGNIFICANT CHANGE UP (ref 0–0)
PHOSPHATE SERPL-MCNC: 3.7 MG/DL — SIGNIFICANT CHANGE UP (ref 2.5–4.5)
PHOSPHATE SERPL-MCNC: 3.7 MG/DL — SIGNIFICANT CHANGE UP (ref 2.5–4.5)
PLATELET # BLD AUTO: 191 K/UL — SIGNIFICANT CHANGE UP (ref 150–400)
POTASSIUM SERPL-MCNC: 5.1 MMOL/L — SIGNIFICANT CHANGE UP (ref 3.5–5.3)
POTASSIUM SERPL-SCNC: 5.1 MMOL/L — SIGNIFICANT CHANGE UP (ref 3.5–5.3)
PROT SERPL-MCNC: 7.6 G/DL — SIGNIFICANT CHANGE UP (ref 6–8.3)
PROT SERPL-MCNC: 7.8 G/DL — SIGNIFICANT CHANGE UP (ref 6–8.3)
PROT SERPL-MCNC: 7.8 G/DL — SIGNIFICANT CHANGE UP (ref 6–8.3)
PSA FLD-MCNC: 1.76 NG/ML — SIGNIFICANT CHANGE UP (ref 0–4)
PTH-INTACT FLD-MCNC: 293 PG/ML — HIGH (ref 15–65)
RBC # BLD: 2.65 M/UL — LOW (ref 4.2–5.8)
RBC # FLD: 13.7 % — SIGNIFICANT CHANGE UP (ref 10.3–14.5)
S PNEUM AG UR QL: NEGATIVE — SIGNIFICANT CHANGE UP
SODIUM SERPL-SCNC: 142 MMOL/L — SIGNIFICANT CHANGE UP (ref 135–145)
TIBC SERPL-MCNC: 254 UG/DL — SIGNIFICANT CHANGE UP (ref 250–450)
UIBC SERPL-MCNC: 212 UG/DL — SIGNIFICANT CHANGE UP (ref 110–370)
UUN UR-MCNC: 713 MG/DL — SIGNIFICANT CHANGE UP
VIT D25+D1,25 OH+D1,25 PNL SERPL-MCNC: 8.9 PG/ML — LOW (ref 19.9–79.3)
WBC # BLD: 8.63 K/UL — SIGNIFICANT CHANGE UP (ref 3.8–10.5)
WBC # FLD AUTO: 8.63 K/UL — SIGNIFICANT CHANGE UP (ref 3.8–10.5)

## 2023-04-24 PROCEDURE — 84155 ASSAY OF PROTEIN SERUM: CPT

## 2023-04-24 PROCEDURE — 83521 IG LIGHT CHAINS FREE EACH: CPT

## 2023-04-24 PROCEDURE — 87641 MR-STAPH DNA AMP PROBE: CPT

## 2023-04-24 PROCEDURE — 81001 URINALYSIS AUTO W/SCOPE: CPT

## 2023-04-24 PROCEDURE — 84100 ASSAY OF PHOSPHORUS: CPT

## 2023-04-24 PROCEDURE — 93005 ELECTROCARDIOGRAM TRACING: CPT

## 2023-04-24 PROCEDURE — 86334 IMMUNOFIX E-PHORESIS SERUM: CPT

## 2023-04-24 PROCEDURE — 71250 CT THORAX DX C-: CPT | Mod: MA

## 2023-04-24 PROCEDURE — 82570 ASSAY OF URINE CREATININE: CPT

## 2023-04-24 PROCEDURE — 80074 ACUTE HEPATITIS PANEL: CPT

## 2023-04-24 PROCEDURE — 96374 THER/PROPH/DIAG INJ IV PUSH: CPT

## 2023-04-24 PROCEDURE — 82652 VIT D 1 25-DIHYDROXY: CPT

## 2023-04-24 PROCEDURE — 82330 ASSAY OF CALCIUM: CPT

## 2023-04-24 PROCEDURE — 82962 GLUCOSE BLOOD TEST: CPT

## 2023-04-24 PROCEDURE — 83735 ASSAY OF MAGNESIUM: CPT

## 2023-04-24 PROCEDURE — 86738 MYCOPLASMA ANTIBODY: CPT

## 2023-04-24 PROCEDURE — G0103: CPT

## 2023-04-24 PROCEDURE — 83970 ASSAY OF PARATHORMONE: CPT

## 2023-04-24 PROCEDURE — 99285 EMERGENCY DEPT VISIT HI MDM: CPT | Mod: 25

## 2023-04-24 PROCEDURE — 84156 ASSAY OF PROTEIN URINE: CPT

## 2023-04-24 PROCEDURE — 84300 ASSAY OF URINE SODIUM: CPT

## 2023-04-24 PROCEDURE — 71045 X-RAY EXAM CHEST 1 VIEW: CPT

## 2023-04-24 PROCEDURE — 74176 CT ABD & PELVIS W/O CONTRAST: CPT | Mod: MA

## 2023-04-24 PROCEDURE — 0225U NFCT DS DNA&RNA 21 SARSCOV2: CPT

## 2023-04-24 PROCEDURE — 87040 BLOOD CULTURE FOR BACTERIA: CPT

## 2023-04-24 PROCEDURE — 82728 ASSAY OF FERRITIN: CPT

## 2023-04-24 PROCEDURE — 83605 ASSAY OF LACTIC ACID: CPT

## 2023-04-24 PROCEDURE — 94640 AIRWAY INHALATION TREATMENT: CPT

## 2023-04-24 PROCEDURE — 82803 BLOOD GASES ANY COMBINATION: CPT

## 2023-04-24 PROCEDURE — 85025 COMPLETE CBC W/AUTO DIFF WBC: CPT

## 2023-04-24 PROCEDURE — 36415 COLL VENOUS BLD VENIPUNCTURE: CPT

## 2023-04-24 PROCEDURE — 80053 COMPREHEN METABOLIC PANEL: CPT

## 2023-04-24 PROCEDURE — 82550 ASSAY OF CK (CPK): CPT

## 2023-04-24 PROCEDURE — 83550 IRON BINDING TEST: CPT

## 2023-04-24 PROCEDURE — 83540 ASSAY OF IRON: CPT

## 2023-04-24 PROCEDURE — 84484 ASSAY OF TROPONIN QUANT: CPT

## 2023-04-24 PROCEDURE — 83935 ASSAY OF URINE OSMOLALITY: CPT

## 2023-04-24 PROCEDURE — 96375 TX/PRO/DX INJ NEW DRUG ADDON: CPT

## 2023-04-24 PROCEDURE — 82310 ASSAY OF CALCIUM: CPT

## 2023-04-24 PROCEDURE — 87640 STAPH A DNA AMP PROBE: CPT

## 2023-04-24 PROCEDURE — 80048 BASIC METABOLIC PNL TOTAL CA: CPT

## 2023-04-24 PROCEDURE — 87449 NOS EACH ORGANISM AG IA: CPT

## 2023-04-24 PROCEDURE — 84132 ASSAY OF SERUM POTASSIUM: CPT

## 2023-04-24 PROCEDURE — 84165 PROTEIN E-PHORESIS SERUM: CPT

## 2023-04-24 PROCEDURE — 83880 ASSAY OF NATRIURETIC PEPTIDE: CPT

## 2023-04-24 PROCEDURE — 84540 ASSAY OF URINE/UREA-N: CPT

## 2023-04-24 PROCEDURE — 85379 FIBRIN DEGRADATION QUANT: CPT

## 2023-04-24 PROCEDURE — 84295 ASSAY OF SERUM SODIUM: CPT

## 2023-04-24 PROCEDURE — 87899 AGENT NOS ASSAY W/OPTIC: CPT

## 2023-04-24 PROCEDURE — 87389 HIV-1 AG W/HIV-1&-2 AB AG IA: CPT

## 2023-04-24 RX ORDER — ALBUTEROL 90 UG/1
2 AEROSOL, METERED ORAL
Qty: 1 | Refills: 0
Start: 2023-04-24 | End: 2023-05-23

## 2023-04-24 RX ORDER — ALBUTEROL 90 UG/1
2 AEROSOL, METERED ORAL ONCE
Refills: 0 | Status: COMPLETED | OUTPATIENT
Start: 2023-04-24 | End: 2023-04-24

## 2023-04-24 RX ORDER — TIOTROPIUM BROMIDE 18 UG/1
2 CAPSULE ORAL; RESPIRATORY (INHALATION)
Qty: 1 | Refills: 0
Start: 2023-04-24 | End: 2023-05-23

## 2023-04-24 RX ORDER — IPRATROPIUM/ALBUTEROL SULFATE 18-103MCG
3 AEROSOL WITH ADAPTER (GRAM) INHALATION EVERY 6 HOURS
Refills: 0 | Status: DISCONTINUED | OUTPATIENT
Start: 2023-04-24 | End: 2023-04-24

## 2023-04-24 RX ORDER — TIOTROPIUM BROMIDE 18 UG/1
2 CAPSULE ORAL; RESPIRATORY (INHALATION) DAILY
Refills: 0 | Status: COMPLETED | OUTPATIENT
Start: 2023-04-24 | End: 2023-04-24

## 2023-04-24 RX ORDER — TIOTROPIUM BROMIDE 18 UG/1
2 CAPSULE ORAL; RESPIRATORY (INHALATION) DAILY
Refills: 0 | Status: DISCONTINUED | OUTPATIENT
Start: 2023-04-24 | End: 2023-04-24

## 2023-04-24 RX ORDER — BUDESONIDE AND FORMOTEROL FUMARATE DIHYDRATE 160; 4.5 UG/1; UG/1
2 AEROSOL RESPIRATORY (INHALATION)
Qty: 1 | Refills: 0
Start: 2023-04-24 | End: 2023-05-23

## 2023-04-24 RX ORDER — BUDESONIDE AND FORMOTEROL FUMARATE DIHYDRATE 160; 4.5 UG/1; UG/1
2 AEROSOL RESPIRATORY (INHALATION)
Refills: 0 | Status: DISCONTINUED | OUTPATIENT
Start: 2023-04-24 | End: 2023-04-24

## 2023-04-24 RX ADMIN — TIOTROPIUM BROMIDE 2 PUFF(S): 18 CAPSULE ORAL; RESPIRATORY (INHALATION) at 13:34

## 2023-04-24 RX ADMIN — HEPARIN SODIUM 5000 UNIT(S): 5000 INJECTION INTRAVENOUS; SUBCUTANEOUS at 05:01

## 2023-04-24 RX ADMIN — ALBUTEROL 2 PUFF(S): 90 AEROSOL, METERED ORAL at 13:32

## 2023-04-24 RX ADMIN — Medication 40 MILLIGRAM(S): at 12:35

## 2023-04-24 NOTE — PROGRESS NOTE ADULT - ASSESSMENT
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 As per patient he does not take any medications, or have any medical history. As per ED attending, pt with productive cough to the point that he is unable to take a breath. Nephrology consult called for SANDI    Assessment:  1) Non oliguric SANDI with unknown prior baseline renal function with S cr 5.7 likely pre-renal/dehydration/poor oral intake/sepsis due to PNA vs ATN from renal hypoperfusion/rhabdomyolysis vs obstructive uropathy  2) NAGMA  3) Hyperkalemia with improvement  4) Acute hypoxic respiratory failure on nasal cannula oxygen  5) ?Pneumothorax/Hypoxemia/pulmonary fibrosis/PNA  6) Cachexia/poor oral intake/?dysphagia r/o malignancy  7) Anemia of chronic illness  8) Renal osteodystrophy    Recommend:  Critically ill in ICU on nasal cannula oxygen  Strict I/o  Avoid Nephrotoxic agents  S cr down trending 5.9  Monitor BMP and electrolytes every 12 hrly  Speech and bedside swallow evaluation  Monitor urine output  Maintain MAP>65-70 mm Hg  Adjust antibiotics as per renal dose adjustment on IV Rocephin/Zithromax  Oxygen supplement  Pulmonary follow up for Pneumothorax/pneumomediastinum  Work up for SANDI/CKD/Anemia as per ordered in progress  Nutritional consult  Volume status and electrolytes noted  No urgent need for RRT/HD  Further work up per primary/ICU team  Plan of care discussed with ICU/primary team/Family son at bedside  Will follow

## 2023-04-24 NOTE — DISCHARGE NOTE PROVIDER - NSDCMRMEDTOKEN_GEN_ALL_CORE_FT
Albuterol (Eqv-ProAir HFA) 90 mcg/inh inhalation aerosol: 2 puff(s) inhaled every 6 hours  budesonide-formoterol 80 mcg-4.5 mcg/inh inhalation aerosol: 2 inhaled once a day  predniSONE 20 mg oral tablet: 1 tab(s) orally once a day   Albuterol (Eqv-ProAir HFA) 90 mcg/inh inhalation aerosol: 2 puff(s) inhaled every 6 hours  budesonide-formoterol 80 mcg-4.5 mcg/inh inhalation aerosol: 2 inhaled once a day  predniSONE 20 mg oral tablet: 1 tab(s) orally once a day  tiotropium 2.5 mcg/inh inhalation aerosol: 2 puff(s) inhaled once a day

## 2023-04-24 NOTE — PROGRESS NOTE ADULT - SUBJECTIVE AND OBJECTIVE BOX
INTERVAL HPI/OVERNIGHT EVENTS: No acute events overnight.     PRESSORS: [ ] YES [X] NO    Antimicrobial:  azithromycin  IVPB 500 milliGRAM(s) IV Intermittent every 24 hours  cefTRIAXone   IVPB 1000 milliGRAM(s) IV Intermittent every 24 hours    Cardiovascular:    Pulmonary:    Hematalogic:  heparin   Injectable 5000 Unit(s) SubCutaneous every 12 hours    Other:  chlorhexidine 2% Cloths 1 Application(s) Topical <User Schedule>    azithromycin  IVPB 500 milliGRAM(s) IV Intermittent every 24 hours  cefTRIAXone   IVPB 1000 milliGRAM(s) IV Intermittent every 24 hours  chlorhexidine 2% Cloths 1 Application(s) Topical <User Schedule>  heparin   Injectable 5000 Unit(s) SubCutaneous every 12 hours    Drug Dosing Weight  Height (cm): 170.2 (2023 10:08)  Weight (kg): 41.3 (2023 14:50)  BMI (kg/m2): 14.3 (2023 14:50)  BSA (m2): 1.45 (2023 14:50)    CENTRAL LINE: [ ] YES [X] NO  LOCATION:   DATE INSERTED:  REMOVE: [ ] YES [ ] NO  EXPLAIN:    BASS: [ ] YES [X] NO    DATE INSERTED:  REMOVE:  [ ] YES [ ] NO  EXPLAIN:    A-LINE:  [ ] YES [X] NO  LOCATION:   DATE INSERTED:  REMOVE:  [ ] YES [ ] NO  EXPLAIN:    PMH -reviewed admission note, no change since admission      ABG - ( 2023 11:28 )  pH, Arterial: 7.24  pH, Blood: x     /  pCO2: 31    /  pO2: 147   / HCO3: 13    / Base Excess: -12.8 /  SaO2: 100                    @ 07:01  -   @ 07:00  --------------------------------------------------------  IN: 1300 mL / OUT: 815 mL / NET: 485 mL            PHYSICAL EXAM:    General - NAD, sitting up in bed, well groomed  Eyes - PERRLA, EOM intact  ENT - Nonicteric sclerae, PERRLA, EOMI. Oropharynx clear. Moist mucous membranes. Conjunctivae appear well perfused.   Neck - No noticeable or palpable swelling, redness or rash around throat or on face  Lymph Nodes - No lymphadenopathy  Cardiovascular - RRR no m/r/g, no JVD, no carotid bruits  Lungs - (+) decreased breath sounds RLL  Skin - No rashes, skin warm and dry, no erythematous areas  Abdomen - Normal bowel sounds, abdomen soft and nontender  Rectal – Rectal exam not performed since no symptoms indicated blood loss.  Extremities - No edema, cyanosis or clubbing  Musculoskeletal - 5/5 strength, normal range of motion, no swollen or erythematous joints.  Neuro– Alert and oriented x 3, CN 2-12 grossly intact.      LABS:  CBC Full  -  ( 2023 03:59 )  WBC Count : 8.63 K/uL  RBC Count : 2.65 M/uL  Hemoglobin : 8.4 g/dL  Hematocrit : 25.6 %  Platelet Count - Automated : 191 K/uL  Mean Cell Volume : 96.6 fl  Mean Cell Hemoglobin : 31.7 pg  Mean Cell Hemoglobin Concentration : 32.8 gm/dL  Auto Neutrophil # : 6.64 K/uL  Auto Lymphocyte # : 0.98 K/uL  Auto Monocyte # : 0.79 K/uL  Auto Eosinophil # : 0.16 K/uL  Auto Basophil # : 0.03 K/uL  Auto Neutrophil % : 76.9 %  Auto Lymphocyte % : 11.4 %  Auto Monocyte % : 9.2 %  Auto Eosinophil % : 1.9 %  Auto Basophil % : 0.3 %        142  |  117<H>  |  86<H>  ----------------------------<  100<H>  5.1   |  16<L>  |  5.75<H>    Ca    8.8      2023 03:59  Phos  3.7     -  Mg     2.2     -24    TPro  7.6  /  Alb  3.3<L>  /  TBili  0.3  /  DBili  x   /  AST  19  /  ALT  14  /  AlkPhos  61  -24      Urinalysis Basic - ( 2023 18:20 )    Color: Yellow / Appearance: Clear / S.015 / pH: x  Gluc: x / Ketone: Trace  / Bili: Negative / Urobili: Negative   Blood: x / Protein: 100 mg/dL / Nitrite: Negative   Leuk Esterase: Negative / RBC: 0-2 /HPF / WBC 0-2 /HPF   Sq Epi: x / Non Sq Epi: x / Bacteria: x          RADIOLOGY & ADDITIONAL STUDIES REVIEWED:  ***    [ ]GOALS OF CARE DISCUSSION WITH PATIENT/FAMILY/PROXY:    CRITICAL CARE TIME SPENT: 35 minutes

## 2023-04-24 NOTE — DISCHARGE NOTE PROVIDER - NSDCCPCAREPLAN_GEN_ALL_CORE_FT
PRINCIPAL DISCHARGE DIAGNOSIS  Diagnosis: Hypoxia  Assessment and Plan of Treatment: You came into the hospital with concerns for pneumonia causing your shortness of breath. You had a CT scan done which showed fibrosis along with a penumothorax. You decided to leave against medical advice.   Please take the inhalers as prescribed and steroids. Please follow up with your primary care physician in one week to inform them of your recent hospitalization and further management of your medical conditions.        SECONDARY DISCHARGE DIAGNOSES  Diagnosis: SANDI (acute kidney injury)  Assessment and Plan of Treatment: You came into the hospital and has kidney function that has been worsening. Please continue to follow up with a primary care doctor. You had a bajwa placed in the icu but you did not want to wait to see if you were able to void on your own. Please go to an ED if you do no urinate in the next 8-10 hours.   Please follow up with your primary care physician in one week to inform them of your recent hospitalization and further management of your medical conditions.

## 2023-04-24 NOTE — DISCHARGE NOTE NURSING/CASE MANAGEMENT/SOCIAL WORK - PATIENT PORTAL LINK FT
You can access the FollowMyHealth Patient Portal offered by [facility] by registering at the following website: [email or web address]. By joining [facility]’s FollowMyHealth portal, you will also be able to view your health information using other applications (apps) compatible with our system.

## 2023-04-24 NOTE — DISCHARGE NOTE NURSING/CASE MANAGEMENT/SOCIAL WORK - NSDCPEFALRISK_GEN_ALL_CORE
For information on Fall & Injury Prevention, visit: [email or web address] OR  [email or web address] OR  https://www.cdc.gov/steadi/patient.html

## 2023-04-24 NOTE — DISCHARGE NOTE PROVIDER - HOSPITAL COURSE
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 As per patient he does not take any medications, or have any medical history. As per ED attending, pt with productive cough to the point that he is unable to take a breath. Pt denies fever, chills, chest pain. In ED, pt reports wanting to urinate but is unable to.  Patient is endorsing weight loss over the last few weeks. Pt was brought to the icu for closer monitoring. He improved with antibiotics but before the treatment could be finished pt decided to leave AMA.   Patient wants to sign out AMA. Risk and complication related to leaving against medical advised described to patient in detail with use of . patient understands risk and yet wants to leave hospital.   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 As per patient he does not take any medications, or have any medical history. As per ED attending, pt with productive cough to the point that he is unable to take a breath. Pt denies fever, chills, chest pain. In ED, pt reports wanting to urinate but is unable to.  Patient is endorsing weight loss over the last few weeks. Pt was brought to the icu for closer monitoring. He improved with antibiotics but before the treatment could be finished pt decided to leave AMA. Pt had bajwa placed in the ED, requested the bajwa be taken out but would not like to wait to see if he voids on his own. Risks and complications were explained to him and was advised to f/u in the ED if he has not voided in 8-10 hours.   Patient wants to sign out AMA. Risk and complication related to leaving against medical advised described to patient in detail with use of . patient understands risk and yet wants to leave hospital.   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 As per patient he does not take any medications, or have any medical history. As per ED attending, pt with productive cough to the point that he is unable to take a breath. Pt denies fever, chills, chest pain. In ED, pt reports wanting to urinate but is unable to.  Patient is endorsing weight loss over the last few weeks. Pt was brought to the icu for closer monitoring. He improved with antibiotics but before the treatment could be finished pt decided to leave AMA. Pt had bajwa placed in the ED, requested the bajwa be taken out but would not like to wait to see if he voids on his own. Risks and complications were explained to him and was advised to f/u in the ED if he has not voided in 8-10 hours.   Patient wants to sign out AMA. Risk and complication related to leaving against medical advised described to patient in detail with use of . patient understands risk and yet wants to leave hospital.  for a full account of hospital course please refer to actual medical records for this is a brief summery

## 2023-04-24 NOTE — DISCHARGE NOTE PROVIDER - CARE PROVIDER_API CALL
Rafael Bradshaw)  Internal Medicine  [address] [facility] [address] [facility], [address]  [place] [address]  Phone: [phone]  Fax: [phone]  Follow Up Time:

## 2023-04-24 NOTE — PROGRESS NOTE ADULT - SUBJECTIVE AND OBJECTIVE BOX
Rafael Bradshaw MD (Nephrology progress note)  [address], [facility],  [address],  [facility]  TEl: [phone]  Cell: [phone]    Patient is a 87y Male seen and evaluated at bedside. Vital signs, laboratory data, imaging studies, consult notes reviewed done within past 24 hours. Overnight events noted.    Allergies    No Known Allergies    Intolerances        ROS:  CONSTITUTIONAL: No fever or chills.  HEENT: No headcahe or visual disturbances.  RESPIRATORY: No shortness of breath, cough, hemoptysis or wheezing  CARDIOVASCULAR: No Chest pain, shortness of breath, palpitations, dizziness, syncope, orthopnea, PND or leg swelling.  GASTROINTESTINAL: No abdominal pain, nausea, vomiting, diarrhea, hematemesis or blood per rectum.  GENITOURINARY: No urinary frequency, urgency, gross hematuria, dysuria, flank or supra pubic pain, difficulty passing urine.  NEUROLOGICAL: No headache, focal limb weakness, tingling or numbness or seizure like activity  SKIN: No skin rash or lesion  MUSCULOSKELETAL: No leg pain, calf pain or swelling    VITALS:    T(C): 36.1 (23 @ 08:00), Max: 36.7 (23 @ 14:00)  HR: 92 (23 @ 10:00) (80 - 115)  BP: 116/51 (23 @ 10:00) (99/69 - 134/79)  RR: 19 (23 @ 10:00) (14 - 25)  SpO2: 94% (23 @ 10:00) (93% - 100%)  CAPILLARY BLOOD GLUCOSE      POCT Blood Glucose.: 120 mg/dL (2023 14:21)      23 @ 07:01  -  23 @ 07:00  --------------------------------------------------------  IN: 1300 mL / OUT: 815 mL / NET: 485 mL    23 @ 07:01  -  23 @ 11:02  --------------------------------------------------------  IN: 300 mL / OUT: 85 mL / NET: 215 mL      MEDICATIONS  (STANDING):  albuterol/ipratropium for Nebulization 3 milliLiter(s) Nebulizer every 6 hours  azithromycin  IVPB 500 milliGRAM(s) IV Intermittent every 24 hours  cefTRIAXone   IVPB 1000 milliGRAM(s) IV Intermittent every 24 hours  chlorhexidine 2% Cloths 1 Application(s) Topical <User Schedule>  heparin   Injectable 5000 Unit(s) SubCutaneous every 12 hours  methylPREDNISolone sodium succinate Injectable 40 milliGRAM(s) IV Push every 6 hours    MEDICATIONS  (PRN):      PHYSICAL EXAM:  GENERAL: Alert, awake and oriented x3 in no distress  HEENT: MAGALYS, EOMI, neck supple, no JVP, no carotid bruit, oral mucosa moist and pink.  CHEST/LUNG: Bilateral clear breath sounds, no rales, no crepitations, no rhonchi, no wheezing  HEART: Regular rate and rhythm, CHANTAL II/VI at LPSB, no gallops, no rub   ABDOMEN: Soft, nontender, non distended, bowel sounds present, no palpable organomegaly  : No flank or supra pubic tenderness.  EXTREMITIES: Peripheral pulses are palpable, no pedal edema  Neurology: AAOx3, no focal neurological deficit  SKIN: No rash or skin lesion  Musculoskeletal: No joint deformity or spinal tenderness.      Vascular ACCESS:     LABS:                        8.4    8.63  )-----------( 191      ( 2023 03:59 )             25.6     04-24    142  |  117<H>  |  86<H>  ----------------------------<  100<H>  5.1   |  16<L>  |  5.75<H>    Ca    8.8      2023 03:59  Phos  3.7     04-24  Mg     2.2     04-24    TPro  7.6  /  Alb  3.3<L>  /  TBili  0.3  /  DBili  x   /  AST  19  /  ALT  14  /  AlkPhos  61  04-24        Urinalysis Basic - ( 2023 18:20 )    Color: Yellow / Appearance: Clear / S.015 / pH: x  Gluc: x / Ketone: Trace  / Bili: Negative / Urobili: Negative   Blood: x / Protein: 100 mg/dL / Nitrite: Negative   Leuk Esterase: Negative / RBC: 0-2 /HPF / WBC 0-2 /HPF   Sq Epi: x / Non Sq Epi: x / Bacteria: x      Sodium, Random Urine: 44 mmol/L ( @ 18:20)  Creatinine, Random Urine: 96 mg/dL ( @ 18:20)  Creatinine, Random Urine: 117 mg/dL ( @ 14:10)  Sodium, Random Urine: 34 mmol/L ( @ 14:10)        RADIOLOGY & ADDITIONAL STUDIES:    Imaging Personally Reviewed:  [x] YES  [ ] NO    Consultant(s) Notes Reviewed:  [x] YES  [ ] NO    Care Discussed with Primary team/ Other Providers [x] YES  [ ] NO       Rafael Bradshaw MD (Nephrology progress note)  , [facility] [place],  [address],  [facility]  TEl: [phone]  Cell: [phone]    Patient is a 87y Male seen and evaluated at bedside. Vital signs, laboratory data, imaging studies, consult notes reviewed done within past 24 hours. Overnight events noted. Patient lying in bed requesting to go home and does not want to stay at the hospital. 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 Son at the bedside. Interval improving renal function with non oliguria. Able to tolerate po intake      Allergies    No Known Allergies    Intolerances        ROS:  Limited  Alert and awake but slow to respond  Denies any chest pain, SOB, palpitations  Denies any abdominal pain, nausea, vomiting    VITALS:    T(C): 36.1 (23 @ 08:00), Max: 36.7 (23 @ 14:00)  HR: 92 (23 @ 10:00) (80 - 115)  BP: 116/51 (23 @ 10:00) (99/69 - 134/79)  RR: 19 (23 @ 10:00) (14 - 25)  SpO2: 94% (23 @ 10:00) (93% - 100%)  CAPILLARY BLOOD GLUCOSE      POCT Blood Glucose.: 120 mg/dL (2023 14:21)      23 @ 07:01  -  23 @ 07:00  --------------------------------------------------------  IN: 1300 mL / OUT: 815 mL / NET: 485 mL    23 @ 07:01  -  23 @ 11:02  --------------------------------------------------------  IN: 300 mL / OUT: 85 mL / NET: 215 mL      MEDICATIONS  (STANDING):  albuterol/ipratropium for Nebulization 3 milliLiter(s) Nebulizer every 6 hours  azithromycin  IVPB 500 milliGRAM(s) IV Intermittent every 24 hours  cefTRIAXone   IVPB 1000 milliGRAM(s) IV Intermittent every 24 hours  chlorhexidine 2% Cloths 1 Application(s) Topical <User Schedule>  heparin   Injectable 5000 Unit(s) SubCutaneous every 12 hours  methylPREDNISolone sodium succinate Injectable 40 milliGRAM(s) IV Push every 6 hours    MEDICATIONS  (PRN):      PHYSICAL EXAM:  GENERAL: Alert, awake and oriented x2-3 in no distress  HEENT: MAGALYS, EOMI, neck supple, no JVP  CHEST/LUNG: Bilateral clear breath sounds, no rales, no crepitations, no rhonchi, no wheezing  HEART: Regular rate and rhythm, CHANTAL II/VI at LPSB, no gallops, no rub   ABDOMEN: Soft, nontender, non distended, bowel sounds present, no palpable organomegaly  : No flank or supra pubic tenderness.  EXTREMITIES: Peripheral pulses are palpable, no pedal edema  Neurology: AAOx3, no focal neurological deficit  SKIN: No rash or skin lesion  Musculoskeletal: No joint deformity      Vascular ACCESS: None    LABS:                        8.4    8.63  )-----------( 191      ( 2023 03:59 )             25.6     04-24    142  |  117<H>  |  86<H>  ----------------------------<  100<H>  5.1   |  16<L>  |  5.75<H>    Ca    8.8      2023 03:59  Phos  3.7     -  Mg     2.2     -24    TPro  7.6  /  Alb  3.3<L>  /  TBili  0.3  /  DBili  x   /  AST  19  /  ALT  14  /  AlkPhos  61  -24        Urinalysis Basic - ( 2023 18:20 )    Color: Yellow / Appearance: Clear / S.015 / pH: x  Gluc: x / Ketone: Trace  / Bili: Negative / Urobili: Negative   Blood: x / Protein: 100 mg/dL / Nitrite: Negative   Leuk Esterase: Negative / RBC: 0-2 /HPF / WBC 0-2 /HPF   Sq Epi: x / Non Sq Epi: x / Bacteria: x      Sodium, Random Urine: 44 mmol/L ( @ 18:20)  Creatinine, Random Urine: 96 mg/dL ( @ 18:20)  Creatinine, Random Urine: 117 mg/dL ( @ 14:10)  Sodium, Random Urine: 34 mmol/L ( @ 14:10)        RADIOLOGY & ADDITIONAL STUDIES:  < from: CT Abdomen and Pelvis No Cont (23 @ 14:35) >    ACC: [id number] EXAM:  CT CHEST   ORDERED BY: TYSON GTZ     ACC: [id number] EXAM:  CT ABDOMEN AND PELVIS   ORDERED BY: TYSON GTZ     PROCEDURE DATE:  2023          INTERPRETATION:  CLINICAL INFORMATION: Hypoxia. Respiratory failure.   Acuterenal failure.    COMPARISON: None.    CONTRAST/COMPLICATIONS:  IV Contrast: NONE  Oral Contrast: NONE  Complications: None reported at time of study completion    PROCEDURE:  CT of the Chest, Abdomen and Pelvis was performed.  Sagittal and coronal reformats were performed.    FINDINGS:  CHEST:  LUNGS AND LARGE AIRWAYS: Diffuse fibrotic lung changes with bilateral   peripheral and basilar reticular opacities and honeycomb cysts.  PLEURA: Trace right pneumothorax.  VESSELS: Atherosclerotic changesof the aorta.  HEART: Heart size is normal. No pericardial effusion.  MEDIASTINUM AND SHERITA: Pneumomediastinum. No lymphadenopathy.  CHEST WALL AND LOWER NECK: Within normal limits.    ABDOMEN AND PELVIS:  LIVER: Within normal limits.  BILE DUCTS: Normal caliber.  GALLBLADDER: Within normal limits.  SPLEEN: Within normal limits.  PANCREAS: Within normal limits.  ADRENALS: Within normal limits.  KIDNEYS/URETERS: Within normal limits.    BLADDER: Contains air and a Lomax catheter balloon.  REPRODUCTIVE ORGANS: Prostate is enlarged.    BOWEL: No bowel obstruction.  PERITONEUM: No ascites.  VESSELS: Atherosclerotic changes.  RETROPERITONEUM/LYMPH NODES: No lymphadenopathy.  ABDOMINAL WALL: Within normal limits.  BONES: Degenerative changes.    IMPRESSION:  Trace right pneumothorax and pneumomediastinum.    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    Findings were discussed with Dr. Duran on 2023 at 3:05 PM by Dr. Leroy   with read back confirmation.    --- End of Report ---            SYDNI MCCALL; Attending Radiologist  This document has been electronically signed. 2023  3:05PM    < end of copied text >    Imaging Personally Reviewed:  [x] YES  [ ] NO    Consultant(s) Notes Reviewed:  [x] YES  [ ] NO    Care Discussed with Primary team/ Other Providers [x] YES  [ ] NO

## 2023-04-24 NOTE — PROGRESS NOTE ADULT - ASSESSMENT
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    Assessment:  #Suspected PNA  #Acute Renal Failure  #NAGMA  #Anemia    Plan:  Neuro:  - No issues    Cardiovascular:  - No issues    Pulmonary:   #Suspected PNA  - pt placed initially on BIPAP  - saturating well on 6L NC without signs of respiratory distress    Gastrointestinal:  #No issues    Renal:  #Acute Renal Failure  - baseline SCr wnl as per family  - P/w Cr 6 with hyperkalemia  - give 5u insulin cocktail  - Place bajwa catheter  - obtain ua, urine lytes    #NAGMA  - HCO3 16 likely due to acute renal failure  - compensatory respiratory alkalosis  - off BIPAP will repeat ABG    Infectious Diseases:  #Suspected PNA  - RLL decreased breath sounds  - CXR negative  -Start azithromycin 500mg qd + rocephin 1g qd  - Send strep ag, legionella, RVP, procalcitonin, mycoplasma igm  - tylenol prn    Heme/onc:   #Anemia  Hb 10.6 without signs of active bleeding  monitor hb    Endo:   No issues    Skin/ catheter:   Bajwa - 4/23 placed in ED  peripheral IVs    Prophylaxis:   Heparin 5000sq q12    Goals of Care:   FULL CODE    Dispo:  Admit to ICU   87M, from home, independent aox3 at baseline, 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    Assessment:  #Suspected PNA  #Acute Renal Failure  #NAGMA  #Anemia    Plan:  Neuro:  - No issues    Cardiovascular:  - No issues    Pulmonary:   #Suspected PNA  - pt placed initially on BIPAP  - saturating well on 6L NC without signs of respiratory distress    #Pulmonary Fibrosis   Started pt on duonebs.   Started on symbicort  Solu-Medrol 40 q6 for 2 days.     Gastrointestinal:  #No issues    Renal:  #Acute Renal Failure  - baseline SCr wnl as per family  - P/w Cr 6 with hyperkalemia  - give 5u insulin cocktail  - Place bajwa catheter  - obtain ua, urine lytes  - Nephro Dr. Bradshaw Following.     #NAGMA  - HCO3 16 likely due to acute renal failure  - compensatory respiratory alkalosis  - off BIPAP will repeat ABG    Infectious Diseases:  #Suspected PNA  - RLL decreased breath sounds  - CXR negative  -Start azithromycin 500mg qd + rocephin 1g qd  - Send strep ag, legionella, RVP, procalcitonin, mycoplasma igm  - tylenol prn    Heme/onc:   #Anemia  Hb 10.6 without signs of active bleeding  monitor hb    Endo:   No issues    Skin/ catheter:   Bawja - 4/23 placed in ED  peripheral IVs    Prophylaxis:   Heparin 5000sq q12    Goals of Care:   FULL CODE    Dispo:  Admit to ICU

## 2023-04-24 NOTE — PROGRESS NOTE ADULT - NUTRITIONAL ASSESSMENT
Diet, Easy to Chew:   For patients receiving Renal Replacement - No Protein Restr, No Conc K, No Conc Phos, Low Sodium (RENAL)  Supplement Feeding Modality:  Oral  Nepro Cans or Servings Per Day:  1       Frequency:  Two Times a day (04-24-23 @ 11:21) [Active]

## 2023-04-24 NOTE — DISCHARGE NOTE PROVIDER - NPI NUMBER (FOR SYSADMIN USE ONLY) :
[[email or web address]] : Pt was fully explained her diagnosis, treatment plan and goal of treatment today on the phone for 10 minutes\par  [[id number]]

## 2023-04-24 NOTE — PROGRESS NOTE ADULT - ATTENDING COMMENTS
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 CT show pulmonary fibrosis with pneumomediastinum and small right apical PTX . He was initially started on BiPaP changed to NC. NAGMA due to SANDI     Assessment:    - Acute Hypoxic Resp Failure   - Pneumomediastinum   - Small right apical PTX   - Lung Fibrosis   - Suspected PNA  - Acute Renal Failure  - NAGMA  - Anemia  - SANDI       Sugg      - No  sedation   - O2 supp as needed   - D/C BiPaP   - Antibx   - F/U cultures   - Duonebs q6h   - Solumedrol   - Serial CXR too evaluate PTX   - Thoracic eval   - NPO   - IV antibx   - F/U cultures   - RVP and covid-19 neg   - PSA  - Monitor renal fx  - Neph eval   - SANDI work up , serum and urine lytes   - Renal sono   - Lomax cath   - Bicarb ivp   - DVT GI prophy

## 2023-04-25 LAB
KAPPA LC SER QL IFE: 11.29 MG/DL — HIGH (ref 0.33–1.94)
KAPPA/LAMBDA FREE LIGHT CHAIN RATIO, SERUM: 1.43 RATIO — SIGNIFICANT CHANGE UP (ref 0.26–1.65)
LAMBDA LC SER QL IFE: 7.92 MG/DL — HIGH (ref 0.57–2.63)

## 2023-04-26 ENCOUNTER — INPATIENT (INPATIENT)
Facility: HOSPITAL | Age: 88
LOS: 11 days | Discharge: NURSING, REHAB OR LONG-TERM CARE | DRG: 682 | End: 2023-05-08
Attending: STUDENT IN AN ORGANIZED HEALTH CARE EDUCATION/TRAINING PROGRAM | Admitting: STUDENT IN AN ORGANIZED HEALTH CARE EDUCATION/TRAINING PROGRAM
Payer: MEDICARE

## 2023-04-26 VITALS
HEART RATE: 105 BPM | OXYGEN SATURATION: 100 % | RESPIRATION RATE: 18 BRPM | SYSTOLIC BLOOD PRESSURE: 152 MMHG | TEMPERATURE: 98 F | HEIGHT: 67 IN | WEIGHT: 110.01 LBS | DIASTOLIC BLOOD PRESSURE: 82 MMHG

## 2023-04-26 LAB
% ALBUMIN: 51.8 % — SIGNIFICANT CHANGE UP
% ALPHA 1: 3.7 % — SIGNIFICANT CHANGE UP
% ALPHA 2: 10.2 % — SIGNIFICANT CHANGE UP
% BETA: 14.5 % — SIGNIFICANT CHANGE UP
% GAMMA: 19.8 % — SIGNIFICANT CHANGE UP
ALBUMIN SERPL ELPH-MCNC: 3.6 G/DL — SIGNIFICANT CHANGE UP (ref 3.5–5)
ALBUMIN SERPL ELPH-MCNC: 4 G/DL — SIGNIFICANT CHANGE UP (ref 3.6–5.5)
ALBUMIN/GLOB SERPL ELPH: 1.1 RATIO — SIGNIFICANT CHANGE UP
ALP SERPL-CCNC: 60 U/L — SIGNIFICANT CHANGE UP (ref 40–120)
ALPHA1 GLOB SERPL ELPH-MCNC: 0.3 G/DL — SIGNIFICANT CHANGE UP (ref 0.1–0.4)
ALPHA2 GLOB SERPL ELPH-MCNC: 0.8 G/DL — SIGNIFICANT CHANGE UP (ref 0.5–1)
ALT FLD-CCNC: 21 U/L DA — SIGNIFICANT CHANGE UP (ref 10–60)
ANION GAP SERPL CALC-SCNC: 11 MMOL/L — SIGNIFICANT CHANGE UP (ref 5–17)
AST SERPL-CCNC: 25 U/L — SIGNIFICANT CHANGE UP (ref 10–40)
B-GLOBULIN SERPL ELPH-MCNC: 1.1 G/DL — HIGH (ref 0.5–1)
BASOPHILS # BLD AUTO: 0.01 K/UL — SIGNIFICANT CHANGE UP (ref 0–0.2)
BASOPHILS NFR BLD AUTO: 0.1 % — SIGNIFICANT CHANGE UP (ref 0–2)
BILIRUB SERPL-MCNC: 0.4 MG/DL — SIGNIFICANT CHANGE UP (ref 0.2–1.2)
BUN SERPL-MCNC: 92 MG/DL — HIGH (ref 7–18)
CALCIUM SERPL-MCNC: 9.5 MG/DL — SIGNIFICANT CHANGE UP (ref 8.4–10.5)
CHLORIDE SERPL-SCNC: 111 MMOL/L — HIGH (ref 96–108)
CO2 SERPL-SCNC: 18 MMOL/L — LOW (ref 22–31)
CREAT SERPL-MCNC: 5.31 MG/DL — HIGH (ref 0.5–1.3)
EGFR: 10 ML/MIN/1.73M2 — LOW
EOSINOPHIL # BLD AUTO: 0 K/UL — SIGNIFICANT CHANGE UP (ref 0–0.5)
EOSINOPHIL NFR BLD AUTO: 0 % — SIGNIFICANT CHANGE UP (ref 0–6)
GAMMA GLOBULIN: 1.5 G/DL — SIGNIFICANT CHANGE UP (ref 0.6–1.6)
GLUCOSE SERPL-MCNC: 138 MG/DL — HIGH (ref 70–99)
HCT VFR BLD CALC: 27.9 % — LOW (ref 39–50)
HGB BLD-MCNC: 9 G/DL — LOW (ref 13–17)
IMM GRANULOCYTES NFR BLD AUTO: 0.4 % — SIGNIFICANT CHANGE UP (ref 0–0.9)
INTERPRETATION SERPL IFE-IMP: SIGNIFICANT CHANGE UP
LACTATE SERPL-SCNC: 3.6 MMOL/L — HIGH (ref 0.7–2)
LIDOCAIN IGE QN: 1782 U/L — HIGH (ref 73–393)
LYMPHOCYTES # BLD AUTO: 0.22 K/UL — LOW (ref 1–3.3)
LYMPHOCYTES # BLD AUTO: 2.2 % — LOW (ref 13–44)
MCHC RBC-ENTMCNC: 31.1 PG — SIGNIFICANT CHANGE UP (ref 27–34)
MCHC RBC-ENTMCNC: 32.3 GM/DL — SIGNIFICANT CHANGE UP (ref 32–36)
MCV RBC AUTO: 96.5 FL — SIGNIFICANT CHANGE UP (ref 80–100)
MONOCYTES # BLD AUTO: 0.25 K/UL — SIGNIFICANT CHANGE UP (ref 0–0.9)
MONOCYTES NFR BLD AUTO: 2.5 % — SIGNIFICANT CHANGE UP (ref 2–14)
NEUTROPHILS # BLD AUTO: 9.59 K/UL — HIGH (ref 1.8–7.4)
NEUTROPHILS NFR BLD AUTO: 94.8 % — HIGH (ref 43–77)
NRBC # BLD: 0 /100 WBCS — SIGNIFICANT CHANGE UP (ref 0–0)
PLATELET # BLD AUTO: 252 K/UL — SIGNIFICANT CHANGE UP (ref 150–400)
POTASSIUM SERPL-MCNC: 5.5 MMOL/L — HIGH (ref 3.5–5.3)
POTASSIUM SERPL-SCNC: 5.5 MMOL/L — HIGH (ref 3.5–5.3)
PROT PATTERN SERPL ELPH-IMP: SIGNIFICANT CHANGE UP
PROT SERPL-MCNC: 8.3 G/DL — SIGNIFICANT CHANGE UP (ref 6–8.3)
RBC # BLD: 2.89 M/UL — LOW (ref 4.2–5.8)
RBC # FLD: 13.9 % — SIGNIFICANT CHANGE UP (ref 10.3–14.5)
SODIUM SERPL-SCNC: 140 MMOL/L — SIGNIFICANT CHANGE UP (ref 135–145)
WBC # BLD: 10.11 K/UL — SIGNIFICANT CHANGE UP (ref 3.8–10.5)
WBC # FLD AUTO: 10.11 K/UL — SIGNIFICANT CHANGE UP (ref 3.8–10.5)

## 2023-04-26 PROCEDURE — 99285 EMERGENCY DEPT VISIT HI MDM: CPT

## 2023-04-26 PROCEDURE — 74176 CT ABD & PELVIS W/O CONTRAST: CPT | Mod: 26,MA

## 2023-04-26 RX ORDER — DEXTROSE 50 % IN WATER 50 %
50 SYRINGE (ML) INTRAVENOUS ONCE
Refills: 0 | Status: COMPLETED | OUTPATIENT
Start: 2023-04-26 | End: 2023-04-26

## 2023-04-26 RX ORDER — SODIUM CHLORIDE 9 MG/ML
500 INJECTION INTRAMUSCULAR; INTRAVENOUS; SUBCUTANEOUS ONCE
Refills: 0 | Status: COMPLETED | OUTPATIENT
Start: 2023-04-26 | End: 2023-04-26

## 2023-04-26 RX ORDER — INSULIN HUMAN 100 [IU]/ML
2.5 INJECTION, SOLUTION SUBCUTANEOUS ONCE
Refills: 0 | Status: COMPLETED | OUTPATIENT
Start: 2023-04-26 | End: 2023-04-26

## 2023-04-26 RX ADMIN — SODIUM CHLORIDE 500 MILLILITER(S): 9 INJECTION INTRAMUSCULAR; INTRAVENOUS; SUBCUTANEOUS at 22:37

## 2023-04-26 NOTE — CHART NOTE - NSCHARTNOTEFT_GEN_A_CORE
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

## 2023-04-26 NOTE — ED ADULT NURSE REASSESSMENT NOTE - PATIENT ON (OXYGEN DELIVERY METHOD)
room air 51M with right lateral tibial plateau fracture, Schatzker 1    Plan:  Medical management appreciated  XR and CT imaging reviewed with nondisplaced right lateral plateau fracture split  NWB RLE BJKI/PT/OT  Pain control PRN  DVT ppx per primary team  No acute orthopedic surgical intervention indicated at this time. Ortho stable for discharge. Patient to follow up with Dr. Alfaro as outpatient for further evaluation and treatment.   Discussed with attending Dr. Alfaro who agrees with plan

## 2023-04-26 NOTE — ED ADULT NURSE REASSESSMENT NOTE - NS ED NURSE REASSESS COMMENT FT1
RN attempted to insert bajwa catheter per MD order, pt requested sized 4,5,or 6, only 10fr available pt consented to use this size, unsuccessful insertion at this time, Catheter meeting Cori carr MD made aware- at bedside, will contact urology

## 2023-04-27 DIAGNOSIS — R33.9 RETENTION OF URINE, UNSPECIFIED: ICD-10-CM

## 2023-04-27 DIAGNOSIS — Z29.9 ENCOUNTER FOR PROPHYLACTIC MEASURES, UNSPECIFIED: ICD-10-CM

## 2023-04-27 DIAGNOSIS — J98.2 INTERSTITIAL EMPHYSEMA: ICD-10-CM

## 2023-04-27 DIAGNOSIS — J84.10 PULMONARY FIBROSIS, UNSPECIFIED: ICD-10-CM

## 2023-04-27 DIAGNOSIS — E87.5 HYPERKALEMIA: ICD-10-CM

## 2023-04-27 DIAGNOSIS — E87.20 ACIDOSIS, UNSPECIFIED: ICD-10-CM

## 2023-04-27 DIAGNOSIS — R10.9 UNSPECIFIED ABDOMINAL PAIN: ICD-10-CM

## 2023-04-27 DIAGNOSIS — N17.9 ACUTE KIDNEY FAILURE, UNSPECIFIED: ICD-10-CM

## 2023-04-27 PROBLEM — Z78.9 OTHER SPECIFIED HEALTH STATUS: Chronic | Status: ACTIVE | Noted: 2023-04-23

## 2023-04-27 LAB
ANION GAP SERPL CALC-SCNC: 10 MMOL/L — SIGNIFICANT CHANGE UP (ref 5–17)
ANION GAP SERPL CALC-SCNC: 9 MMOL/L — SIGNIFICANT CHANGE UP (ref 5–17)
APPEARANCE UR: CLEAR — SIGNIFICANT CHANGE UP
BACTERIA # UR AUTO: ABNORMAL /HPF
BASE EXCESS BLDV CALC-SCNC: -5.6 MMOL/L — SIGNIFICANT CHANGE UP
BILIRUB UR-MCNC: NEGATIVE — SIGNIFICANT CHANGE UP
BLD GP AB SCN SERPL QL: SIGNIFICANT CHANGE UP
BLOOD GAS COMMENTS, VENOUS: SIGNIFICANT CHANGE UP
BUN SERPL-MCNC: 83 MG/DL — HIGH (ref 7–18)
BUN SERPL-MCNC: 84 MG/DL — HIGH (ref 7–18)
CA-I SERPL-SCNC: SIGNIFICANT CHANGE UP MMOL/L (ref 1.15–1.33)
CALCIUM SERPL-MCNC: 8.4 MG/DL — SIGNIFICANT CHANGE UP (ref 8.4–10.5)
CALCIUM SERPL-MCNC: 8.5 MG/DL — SIGNIFICANT CHANGE UP (ref 8.4–10.5)
CHLORIDE SERPL-SCNC: 114 MMOL/L — HIGH (ref 96–108)
CHLORIDE SERPL-SCNC: 115 MMOL/L — HIGH (ref 96–108)
CO2 SERPL-SCNC: 16 MMOL/L — LOW (ref 22–31)
CO2 SERPL-SCNC: 18 MMOL/L — LOW (ref 22–31)
COLOR SPEC: YELLOW — SIGNIFICANT CHANGE UP
CREAT SERPL-MCNC: 4.86 MG/DL — HIGH (ref 0.5–1.3)
CREAT SERPL-MCNC: 5.04 MG/DL — HIGH (ref 0.5–1.3)
DIFF PNL FLD: ABNORMAL
EGFR: 11 ML/MIN/1.73M2 — LOW
EGFR: 11 ML/MIN/1.73M2 — LOW
EPI CELLS # UR: SIGNIFICANT CHANGE UP /HPF
GAS PNL BLDV: 135 MMOL/L — LOW (ref 136–145)
GAS PNL BLDV: SIGNIFICANT CHANGE UP
GLUCOSE SERPL-MCNC: 138 MG/DL — HIGH (ref 70–99)
GLUCOSE SERPL-MCNC: 94 MG/DL — SIGNIFICANT CHANGE UP (ref 70–99)
GLUCOSE UR QL: 50 MG/DL
HCO3 BLDV-SCNC: 20 MMOL/L — LOW (ref 22–29)
HCT VFR BLD CALC: 22.5 % — LOW (ref 39–50)
HCT VFR BLD CALC: 27.7 % — LOW (ref 39–50)
HGB BLD-MCNC: 7.6 G/DL — LOW (ref 13–17)
HGB BLD-MCNC: 8.6 G/DL — LOW (ref 13–17)
HOROWITZ INDEX BLDV+IHG-RTO: 21 — SIGNIFICANT CHANGE UP
KETONES UR-MCNC: NEGATIVE — SIGNIFICANT CHANGE UP
LACTATE BLDV-MCNC: 1.7 MMOL/L — SIGNIFICANT CHANGE UP (ref 0.5–2)
LACTATE SERPL-SCNC: 2 MMOL/L — SIGNIFICANT CHANGE UP (ref 0.7–2)
LEUKOCYTE ESTERASE UR-ACNC: NEGATIVE — SIGNIFICANT CHANGE UP
MAGNESIUM SERPL-MCNC: 2.1 MG/DL — SIGNIFICANT CHANGE UP (ref 1.6–2.6)
MCHC RBC-ENTMCNC: 31 GM/DL — LOW (ref 32–36)
MCHC RBC-ENTMCNC: 31.4 PG — SIGNIFICANT CHANGE UP (ref 27–34)
MCHC RBC-ENTMCNC: 31.5 PG — SIGNIFICANT CHANGE UP (ref 27–34)
MCHC RBC-ENTMCNC: 33.8 GM/DL — SIGNIFICANT CHANGE UP (ref 32–36)
MCV RBC AUTO: 101.5 FL — HIGH (ref 80–100)
MCV RBC AUTO: 93 FL — SIGNIFICANT CHANGE UP (ref 80–100)
NITRITE UR-MCNC: NEGATIVE — SIGNIFICANT CHANGE UP
NRBC # BLD: 0 /100 WBCS — SIGNIFICANT CHANGE UP (ref 0–0)
NRBC # BLD: 0 /100 WBCS — SIGNIFICANT CHANGE UP (ref 0–0)
PCO2 BLDV: 39 MMHG — LOW (ref 42–55)
PH BLDV: 7.32 — SIGNIFICANT CHANGE UP (ref 7.32–7.43)
PH UR: 5 — SIGNIFICANT CHANGE UP (ref 5–8)
PHOSPHATE SERPL-MCNC: 3.7 MG/DL — SIGNIFICANT CHANGE UP (ref 2.5–4.5)
PLATELET # BLD AUTO: 193 K/UL — SIGNIFICANT CHANGE UP (ref 150–400)
PLATELET # BLD AUTO: 200 K/UL — SIGNIFICANT CHANGE UP (ref 150–400)
PO2 BLDV: 76 MMHG — SIGNIFICANT CHANGE UP
POTASSIUM BLDV-SCNC: 4.6 MMOL/L — SIGNIFICANT CHANGE UP (ref 3.5–5.1)
POTASSIUM SERPL-MCNC: 4.8 MMOL/L — SIGNIFICANT CHANGE UP (ref 3.5–5.3)
POTASSIUM SERPL-MCNC: 4.8 MMOL/L — SIGNIFICANT CHANGE UP (ref 3.5–5.3)
POTASSIUM SERPL-SCNC: 4.8 MMOL/L — SIGNIFICANT CHANGE UP (ref 3.5–5.3)
POTASSIUM SERPL-SCNC: 4.8 MMOL/L — SIGNIFICANT CHANGE UP (ref 3.5–5.3)
PROT UR-MCNC: 30 MG/DL
RBC # BLD: 2.42 M/UL — LOW (ref 4.2–5.8)
RBC # BLD: 2.73 M/UL — LOW (ref 4.2–5.8)
RBC # FLD: 13.8 % — SIGNIFICANT CHANGE UP (ref 10.3–14.5)
RBC # FLD: 14.2 % — SIGNIFICANT CHANGE UP (ref 10.3–14.5)
RBC CASTS # UR COMP ASSIST: ABNORMAL /HPF (ref 0–2)
SAO2 % BLDV: 97.2 % — SIGNIFICANT CHANGE UP
SODIUM SERPL-SCNC: 141 MMOL/L — SIGNIFICANT CHANGE UP (ref 135–145)
SODIUM SERPL-SCNC: 141 MMOL/L — SIGNIFICANT CHANGE UP (ref 135–145)
SP GR SPEC: 1.01 — SIGNIFICANT CHANGE UP (ref 1.01–1.02)
UROBILINOGEN FLD QL: NEGATIVE — SIGNIFICANT CHANGE UP
WBC # BLD: 10.49 K/UL — SIGNIFICANT CHANGE UP (ref 3.8–10.5)
WBC # BLD: 9.37 K/UL — SIGNIFICANT CHANGE UP (ref 3.8–10.5)
WBC # FLD AUTO: 10.49 K/UL — SIGNIFICANT CHANGE UP (ref 3.8–10.5)
WBC # FLD AUTO: 9.37 K/UL — SIGNIFICANT CHANGE UP (ref 3.8–10.5)
WBC UR QL: SIGNIFICANT CHANGE UP /HPF (ref 0–5)

## 2023-04-27 PROCEDURE — 99223 1ST HOSP IP/OBS HIGH 75: CPT | Mod: GC

## 2023-04-27 PROCEDURE — 93010 ELECTROCARDIOGRAM REPORT: CPT

## 2023-04-27 RX ORDER — SODIUM CHLORIDE 9 MG/ML
1000 INJECTION, SOLUTION INTRAVENOUS
Refills: 0 | Status: DISCONTINUED | OUTPATIENT
Start: 2023-04-27 | End: 2023-04-30

## 2023-04-27 RX ORDER — CHLORHEXIDINE GLUCONATE 213 G/1000ML
1 SOLUTION TOPICAL
Refills: 0 | Status: DISCONTINUED | OUTPATIENT
Start: 2023-04-27 | End: 2023-05-02

## 2023-04-27 RX ORDER — ALBUTEROL 90 UG/1
2 AEROSOL, METERED ORAL EVERY 6 HOURS
Refills: 0 | Status: DISCONTINUED | OUTPATIENT
Start: 2023-04-27 | End: 2023-04-29

## 2023-04-27 RX ORDER — TIOTROPIUM BROMIDE 18 UG/1
2 CAPSULE ORAL; RESPIRATORY (INHALATION) DAILY
Refills: 0 | Status: DISCONTINUED | OUTPATIENT
Start: 2023-04-27 | End: 2023-05-01

## 2023-04-27 RX ORDER — ACETAMINOPHEN 500 MG
650 TABLET ORAL EVERY 6 HOURS
Refills: 0 | Status: DISCONTINUED | OUTPATIENT
Start: 2023-04-27 | End: 2023-05-08

## 2023-04-27 RX ORDER — TAMSULOSIN HYDROCHLORIDE 0.4 MG/1
0.4 CAPSULE ORAL AT BEDTIME
Refills: 0 | Status: DISCONTINUED | OUTPATIENT
Start: 2023-04-27 | End: 2023-05-08

## 2023-04-27 RX ORDER — SODIUM ZIRCONIUM CYCLOSILICATE 10 G/10G
10 POWDER, FOR SUSPENSION ORAL ONCE
Refills: 0 | Status: COMPLETED | OUTPATIENT
Start: 2023-04-27 | End: 2023-04-27

## 2023-04-27 RX ORDER — HEPARIN SODIUM 5000 [USP'U]/ML
5000 INJECTION INTRAVENOUS; SUBCUTANEOUS EVERY 12 HOURS
Refills: 0 | Status: DISCONTINUED | OUTPATIENT
Start: 2023-04-27 | End: 2023-05-08

## 2023-04-27 RX ORDER — CALCITRIOL 0.5 UG/1
0.25 CAPSULE ORAL
Refills: 0 | Status: DISCONTINUED | OUTPATIENT
Start: 2023-04-27 | End: 2023-05-08

## 2023-04-27 RX ORDER — SODIUM BICARBONATE 1 MEQ/ML
650 SYRINGE (ML) INTRAVENOUS THREE TIMES A DAY
Refills: 0 | Status: DISCONTINUED | OUTPATIENT
Start: 2023-04-27 | End: 2023-05-03

## 2023-04-27 RX ORDER — BUDESONIDE AND FORMOTEROL FUMARATE DIHYDRATE 160; 4.5 UG/1; UG/1
2 AEROSOL RESPIRATORY (INHALATION)
Refills: 0 | Status: DISCONTINUED | OUTPATIENT
Start: 2023-04-27 | End: 2023-05-04

## 2023-04-27 RX ADMIN — Medication 50 MILLILITER(S): at 00:34

## 2023-04-27 RX ADMIN — HEPARIN SODIUM 5000 UNIT(S): 5000 INJECTION INTRAVENOUS; SUBCUTANEOUS at 05:35

## 2023-04-27 RX ADMIN — Medication 650 MILLIGRAM(S): at 21:37

## 2023-04-27 RX ADMIN — TAMSULOSIN HYDROCHLORIDE 0.4 MILLIGRAM(S): 0.4 CAPSULE ORAL at 21:37

## 2023-04-27 RX ADMIN — SODIUM CHLORIDE 50 MILLILITER(S): 9 INJECTION, SOLUTION INTRAVENOUS at 12:31

## 2023-04-27 RX ADMIN — HEPARIN SODIUM 5000 UNIT(S): 5000 INJECTION INTRAVENOUS; SUBCUTANEOUS at 17:36

## 2023-04-27 RX ADMIN — BUDESONIDE AND FORMOTEROL FUMARATE DIHYDRATE 2 PUFF(S): 160; 4.5 AEROSOL RESPIRATORY (INHALATION) at 21:37

## 2023-04-27 RX ADMIN — INSULIN HUMAN 2.5 UNIT(S): 100 INJECTION, SOLUTION SUBCUTANEOUS at 00:30

## 2023-04-27 RX ADMIN — SODIUM ZIRCONIUM CYCLOSILICATE 10 GRAM(S): 10 POWDER, FOR SUSPENSION ORAL at 00:30

## 2023-04-27 NOTE — PROGRESS NOTE ADULT - PROBLEM SELECTOR PLAN 5
pt p/w elevated lactate, resolved  unclear etiology CT shows redemonstrated pneumomediastinum, continued findings of diffuse lung fibrosis with peripheral/basilar reticular opacities and honeycombing.   CT Surgery recommended no acute intervention last admission  saturating well on room air CT shows redemonstrated pneumomediastinum, continued findings of diffuse lung fibrosis with peripheral/basilar reticular opacities and honeycombing.   CT Surgery recommended no acute intervention last admission  Was noted to be on Azithro/Rocephin last admission for suspected PNA- pt has no clinical of signs of pneumonia, will hold off Abx for now  Resume Spiriva, Symbicort and Albuterol  saturating well on room air  Pulm, Dr. Duran consulted

## 2023-04-27 NOTE — H&P ADULT - NSHPPHYSICALEXAM_GEN_ALL_CORE
PHYSICAL EXAM:  GENERAL: NAD, lying in bed comfortably, cachectic and malnourished, ecchymotic UEs  HEAD:  Atraumatic, Normocephalic  EYES: EOMI, PERRLA, conjunctiva and sclera clear  ENT: Moist mucous membranes  NECK: Supple, No JVD  CHEST/LUNG: Clear to auscultation bilaterally; No rales, rhonchi, wheezing, or rubs. Unlabored respirations  HEART: Regular rate and rhythm; No murmurs, rubs, or gallops  ABDOMEN: Bowel sounds present; Soft, Nontender, Nondistended.  EXTREMITIES:  2+ Peripheral Pulses, brisk capillary refill. No clubbing, cyanosis, or edema  NERVOUS SYSTEM:  Alert & Oriented X3, speech clear. No deficits   MSK: FROM all 4 extremities, full and equal strength  SKIN: No rashes or lesions  : bajwa with clear yellow urine 1300 cc PHYSICAL EXAM:  Vital Signs Last 24 Hrs  T(C): 36.4 (04-27-23 @ 04:37), Max: 36.8 (04-27-23 @ 02:02)  T(F): 97.6 (04-27-23 @ 04:37), Max: 98.2 (04-27-23 @ 02:02)  HR: 88 (04-27-23 @ 04:37) (80 - 105)  BP: 110/68 (04-27-23 @ 04:37) (102/66 - 152/82)  BP(mean): --  RR: 18 (04-27-23 @ 04:37) (18 - 18)  SpO2: 94% (04-27-23 @ 04:37) (94% - 100%)    GENERAL: NAD, lying in bed comfortably, cachectic and malnourished, ecchymotic UEs  HEAD:  Atraumatic, Normocephalic  EYES: EOMI, PERRLA, conjunctiva and sclera clear  ENT: Moist mucous membranes  NECK: Supple, No JVD  CHEST/LUNG: Clear to auscultation bilaterally; No rales, rhonchi, wheezing, or rubs. Unlabored respirations  HEART: Regular rate and rhythm; No murmurs, rubs, or gallops  ABDOMEN: Bowel sounds present; Soft, Nontender, Nondistended.  EXTREMITIES:  2+ Peripheral Pulses, brisk capillary refill. No clubbing, cyanosis, or edema  NERVOUS SYSTEM:  Alert & Oriented X3, speech clear. No deficits   MSK: FROM all 4 extremities, full and equal strength  SKIN: No rashes or lesions  : bajwa with clear yellow urine 1300 cc

## 2023-04-27 NOTE — PROGRESS NOTE ADULT - PROBLEM SELECTOR PLAN 2
patient presents with elevated Cr to 5.3, unclear baseline however recently elevated up to 6.8 and improving since prior admission  associated with urinary retention  BUN/Cr 92/5.3 > 84/5.0  bajwa placed by urology overnight per patient request in ED  CT shows renal pelvis fullness of bilateral renal pelvis without calcified stone identified along the course the ureter. Bladder wall thickening.    - maintain bajwa  - monitor Cr  - avoid nephrotoxic agents patient presents with elevated Cr to 5.3, unclear baseline however recently elevated up to 6.8 and improving since prior admission  associated with urinary retention  bajwa placed by urology overnight per patient request in ED  CT shows renal pelvis fullness of bilateral renal pelvis without calcified stone identified along the course the ureter. Bladder wall thickening. Prostate Enlargement.    - maintain bajwa  - monitor Cr  - avoid nephrotoxic agents  - Cr improving  - NephroDr. Bradshaw following likely 2/2 to prostate enlargement  c/w Flomax  plan as above

## 2023-04-27 NOTE — PROGRESS NOTE ADULT - PROBLEM SELECTOR PLAN 4
CT shows redemonstrated pneumomediastinum, continued findings of diffuse lung fibrosis with peripheral/basilar reticular opacities and honeycombing.  saturating well on room air CT shows redemonstrated pneumomediastinum, continued findings of diffuse lung fibrosis with peripheral/basilar reticular opacities and honeycombing.   CT Surgery recommended no acute intervention last admission  saturating well on room air pt presents with elevated K, urinary retention after failing trial of void  improved after 500 cc NS, D50, insulin 2.5 U, lokelma 10 mg given by ED  EKG shows peaked t waves    - monitor K, avoid nephrotoxic agents  - f/u repeat EKG  - telemetry monitoring  - Resolved*

## 2023-04-27 NOTE — PROGRESS NOTE ADULT - SUBJECTIVE AND OBJECTIVE BOX
PGY-1 Progress Note discussed with attending    PAGER #: [[phone]] TILL 5:00 PM  PLEASE CONTACT ON CALL TEAM:   - On Call Team (Please refer to Dustin) FROM 5:00 PM - 8:30PM  - Nightfloat Team FROM 8:30 -7:30 AM    INTERVAL HPI/OVERNIGHT EVENTS:   No acute overnight events. Pt states he feels weak but denies any     REVIEW OF SYSTEMS:  CONSTITUTIONAL: No fever, weight loss, or fatigue  RESPIRATORY: No cough, wheezing, chills or hemoptysis; No shortness of breath  CARDIOVASCULAR: No chest pain, palpitations, dizziness, or leg swelling  GASTROINTESTINAL: No abdominal pain. No nausea, vomiting, or hematemesis; No diarrhea or constipation. No melena or hematochezia.  GENITOURINARY: No dysuria or hematuria, urinary frequency  NEUROLOGICAL: No headaches, memory loss, loss of strength, numbness, or tremors  SKIN: No itching, burning, rashes, or lesions     MEDICATIONS  (STANDING):  chlorhexidine 2% Cloths 1 Application(s) Topical <User Schedule>  dextrose 5% + sodium chloride 0.45%. 1000 milliLiter(s) (50 mL/Hr) IV Continuous <Continuous>  heparin   Injectable 5000 Unit(s) SubCutaneous every 12 hours  tamsulosin 0.4 milliGRAM(s) Oral at bedtime    MEDICATIONS  (PRN):  acetaminophen     Tablet .. 650 milliGRAM(s) Oral every 6 hours PRN Temp greater or equal to 38C (100.4F), Mild Pain (1 - 3)      Vital Signs Last 24 Hrs  T(C): 36.2 (27 Apr 2023 11:30), Max: 36.8 (27 Apr 2023 02:02)  T(F): 97.2 (27 Apr 2023 11:30), Max: 98.2 (27 Apr 2023 02:02)  HR: 101 (27 Apr 2023 11:30) (80 - 105)  BP: 109/58 (27 Apr 2023 12:15) (88/63 - 152/82)  BP(mean): --  RR: 20 (27 Apr 2023 11:30) (18 - 20)  SpO2: 99% (27 Apr 2023 11:30) (94% - 100%)    Parameters below as of 27 Apr 2023 11:30  Patient On (Oxygen Delivery Method): room air        PHYSICAL EXAMINATION:  GENERAL: NAD, well built  HEAD:  Atraumatic, Normocephalic  EYES:  conjunctiva and sclera clear  NECK: Supple, No JVD, Normal thyroid  CHEST/LUNG: Clear to auscultation. Clear to percussion bilaterally; No rales, rhonchi, wheezing, or rubs  HEART: Regular rate and rhythm; No murmurs, rubs, or gallops  ABDOMEN: Soft, Nontender, Nondistended; Bowel sounds present  NERVOUS SYSTEM:  Alert & Oriented X3,    EXTREMITIES:  2+ Peripheral Pulses, No clubbing, cyanosis, or edema  SKIN: warm dry                          8.6    10.49 )-----------( 193      ( 27 Apr 2023 10:55 )             27.7     04-27    141  |  115<H>  |  83<H>  ----------------------------<  94  4.8   |  16<L>  |  4.86<H>    Ca    8.5      27 Apr 2023 05:25  Phos  3.7     04-27  Mg     2.1     04-27    TPro  8.3  /  Alb  3.6  /  TBili  0.4  /  DBili  x   /  AST  25  /  ALT  21  /  AlkPhos  60  04-26    LIVER FUNCTIONS - ( 26 Apr 2023 21:24 )  Alb: 3.6 g/dL / Pro: 8.3 g/dL / ALK PHOS: 60 U/L / ALT: 21 U/L DA / AST: 25 U/L / GGT: x                       I&O's Summary      CAPILLARY BLOOD GLUCOSE        CAPILLARY BLOOD GLUCOSE          RADIOLOGY & ADDITIONAL TESTS:                   PGY-1 Progress Note discussed with attending    PAGER #: [[phone]] TILL 5:00 PM  PLEASE CONTACT ON CALL TEAM:   - On Call Team (Please refer to Dustin) FROM 5:00 PM - 8:30PM  - Nightfloat Team FROM 8:30 -7:30 AM    INTERVAL HPI/OVERNIGHT EVENTS:   No acute overnight events. Pt states he feels weak and dehydrated but denies any other acute symptoms.     REVIEW OF SYSTEMS:  CONSTITUTIONAL: No fever, weight loss. + fatigue  RESPIRATORY: No cough, wheezing, chills or hemoptysis; No shortness of breath  CARDIOVASCULAR: No chest pain, palpitations, dizziness, or leg swelling  GASTROINTESTINAL: No abdominal pain. No nausea, vomiting, or hematemesis; No diarrhea or constipation. No melena or hematochezia.  GENITOURINARY: No dysuria or hematuria, urinary frequency  NEUROLOGICAL: No headaches, memory loss, loss of strength, numbness, or tremors  SKIN: No itching, burning, rashes, or lesions     MEDICATIONS  (STANDING):  chlorhexidine 2% Cloths 1 Application(s) Topical <User Schedule>  dextrose 5% + sodium chloride 0.45%. 1000 milliLiter(s) (50 mL/Hr) IV Continuous <Continuous>  heparin   Injectable 5000 Unit(s) SubCutaneous every 12 hours  tamsulosin 0.4 milliGRAM(s) Oral at bedtime    MEDICATIONS  (PRN):  acetaminophen     Tablet .. 650 milliGRAM(s) Oral every 6 hours PRN Temp greater or equal to 38C (100.4F), Mild Pain (1 - 3)      Vital Signs Last 24 Hrs  T(C): 36.2 (27 Apr 2023 11:30), Max: 36.8 (27 Apr 2023 02:02)  T(F): 97.2 (27 Apr 2023 11:30), Max: 98.2 (27 Apr 2023 02:02)  HR: 101 (27 Apr 2023 11:30) (80 - 105)  BP: 109/58 (27 Apr 2023 12:15) (88/63 - 152/82)  BP(mean): --  RR: 20 (27 Apr 2023 11:30) (18 - 20)  SpO2: 99% (27 Apr 2023 11:30) (94% - 100%)    Parameters below as of 27 Apr 2023 11:30  Patient On (Oxygen Delivery Method): room air        PHYSICAL EXAMINATION:  GENERAL: NAD, thin cachectic elderly male  HEAD:  Atraumatic, Normocephalic  EYES:  conjunctiva and sclera clear  NECK: Supple, No JVD, Normal thyroid  CHEST/LUNG: Clear to auscultation. Clear to percussion bilaterally; No rales, rhonchi, wheezing, or rubs  HEART: Regular rate and rhythm; No murmurs, rubs, or gallops  ABDOMEN: Soft, Nontender, Nondistended; Bowel sounds present  NERVOUS SYSTEM:  Alert & Oriented X3   EXTREMITIES:  2+ Peripheral Pulses, No clubbing, cyanosis, or edema  SKIN: warm dry  : + bajwa                          8.6    10.49 )-----------( 193      ( 27 Apr 2023 10:55 )             27.7     04-27    141  |  115<H>  |  83<H>  ----------------------------<  94  4.8   |  16<L>  |  4.86<H>    Ca    8.5      27 Apr 2023 05:25  Phos  3.7     04-27  Mg     2.1     04-27    TPro  8.3  /  Alb  3.6  /  TBili  0.4  /  DBili  x   /  AST  25  /  ALT  21  /  AlkPhos  60  04-26    LIVER FUNCTIONS - ( 26 Apr 2023 21:24 )  Alb: 3.6 g/dL / Pro: 8.3 g/dL / ALK PHOS: 60 U/L / ALT: 21 U/L DA / AST: 25 U/L / GGT: x

## 2023-04-27 NOTE — H&P ADULT - PROBLEM SELECTOR PLAN 4
CT shows redemonstrated pneumomediastinum, continued findings of diffuse lung fibrosis with peripheral/basilar reticular opacities and honeycombing.  saturating well on room air

## 2023-04-27 NOTE — ED PROVIDER NOTE - PHYSICAL EXAMINATION
Nontoxic-appearing.  Looks uncomfortable.  Abdomen tender to palpation.  Unable to discern focal tenderness.

## 2023-04-27 NOTE — PROGRESS NOTE ADULT - PROBLEM SELECTOR PLAN 6
dvt ppx SQ heparin pt p/w elevated lactate, resolved  unclear etiology CT Abdomen & Pelvis shows redemonstrated pneumomediastinum.  Patient saturating well on room air  No acute interventions as above

## 2023-04-27 NOTE — ED PROVIDER NOTE - CLINICAL SUMMARY MEDICAL DECISION MAKING FREE TEXT BOX
87-year-old male coming in with abdominal discomfort concern for urinary retention vs worsening kidney failure. Pts creatinine 2ds ago was 5.75 - stable. OUtput abt 400cc in bajwa. 12fr bajwa placed by surgery that is covering urolgoy at this hr. noted to have elevated potassium - ordered meds. D/w hospitalist and MAR- agrees with admission - informed UA and rpt bmp is pending.

## 2023-04-27 NOTE — PROGRESS NOTE ADULT - ASSESSMENT
87 year old M from home with PMH of pulmonary fibrosis presents with urinary retention and acute hyperkalemia admitted to telemetry for management of hyperkalemia and SANDI 87 year old M from home with PMH of pulmonary fibrosis, pertinent recent admission to [facility] ICU (4/23-4/26) for close monitoring. CT show pulmonary fibrosis with pneumomediastinum and small right apical PTX . He was initially started on BiPaP changed to NC. NAGMA due to SANDI. Pt left AMA. Pt presents with urinary retention and acute hyperkalemia admitted to telemetry for management of hyperkalemia and SANDI.

## 2023-04-27 NOTE — CONSULT NOTE ADULT - SUBJECTIVE AND OBJECTIVE BOX
Rafael Bradshaw MD (Nephrology)  [address], [facility],  [address],  [facility]  TEl: [phone]  Cell: [phone]    Patient is a 87y old  Male who presents with a chief complaint of Hyperkalemia (2023 01:00)      HPI:  87 year old M from home with no known PMH presents with urinary retention. Patient reports since being discharged he has had very little urine output. Denies any blood in urine or pain. Denies fever, chills, cp, sob, palpitations, lightheadedness, dizziness, n/v/d, abdominal pain, constipation. Reports significant weight loss and poor oral intake as he does not have teeth and his appetite is deteriorating, reports one episode of loose stool on Wednesday AM.    Of note, pt was admitted to [facility] ICU  and left AMA  prior to completion of trial of void. Was admitted for acute hypoxic respiratory failure 2/2 pulmonary fibrosis, CT shows diffuse lung fibrosis, pneumomediastinum, mild fullness of bilateral renal pelvis without stones, bladder wall thickening    ED Course  Vitals: 152/82 P 105 R 18 T 97.5 F SpO2 100% RA  Meds: 500 cc NS, D50, insulin 2.5 U, lokelma 10 mg  EKG: NSR with peaked t waves (2023 01:00)      PAST MEDICAL & SURGICAL HISTORY:  No pertinent past medical history      No significant past surgical history            Allergies:  No Known Allergies      Home Medications:   acetaminophen     Tablet .. 650 milliGRAM(s) Oral every 6 hours PRN  chlorhexidine 2% Cloths 1 Application(s) Topical <User Schedule>  heparin   Injectable 5000 Unit(s) SubCutaneous every 12 hours  tamsulosin 0.4 milliGRAM(s) Oral at bedtime      Hospital Medications:   MEDICATIONS  (STANDING):  chlorhexidine 2% Cloths 1 Application(s) Topical <User Schedule>  heparin   Injectable 5000 Unit(s) SubCutaneous every 12 hours  tamsulosin 0.4 milliGRAM(s) Oral at bedtime      SOCIAL HISTORY:  Denies ETOh, Smoking,     Family History:  FAMILY HISTORY:      ROS:  CONSTITUTIONAL: No fever or chills.  HEENT: No headche, visual disturbances, hearing impairment.  RESPIRATORY: No shortness of breath, cough, wheezing or hemoptysis  CARDIOVASCULAR: No Chest pain, shortness of breath, palpitations, dizziness, syncope, orthopnea, PND or leg swelling.  GASTROINTESTINAL: No abdominal pain, nausea, vomiting, diarrhea, hematemesis or blood per rectum.  GENITOURINARY: No urinary frequency, urgency, gross hematuria, dysuria, foamy urine, flank or supra pubic pain, no prior history of kidney stones.  NEUROLOGICAL: No headache,  focal limb weakness, tingling or numbness sensation or seizure like activity  SKIN: No rash or skin lesion  MUSCULOSKELETAL: No leg pain, calf pain or swelling  Psych: Denies suicidal or homicidal ideation    VITALS:  T(F): 97.5 (23 @ 07:30), Max: 98.2 (23 @ 02:02)  HR: 96 (23 @ 07:30)  BP: 138/80 (23 @ 07:30)  RR: 18 (23 @ 07:30)  SpO2: 95% (23 @ 07:30)  Wt(kg): --    Height (cm): 170.2 ( @ 18:18)  Weight (kg): 49.9 ( @ 18:18)  BMI (kg/m2): 17.2 ( @ 18:18)  BSA (m2): 1.57 ( @ 18:18)  CAPILLARY BLOOD GLUCOSE            PHYSICAL EXAM:  GENERAL: Alert, awake, oriented x3   HEENT: MAGALYS, EOMI, neck supple, no JVP, no carotid bruits, no palpable thyromegaly or lymphadenopathy, no carotid bruits  CHEST/LUNG: Bilateral clear breath sounds, no rales, no crepitations, no wheezing  HEART: Regular rate and rhythm, CHANTAL II/VI at LPSB, no gallops, no rub   ABDOMEN: Soft, nontender, non distended, bowel sounds present, no palpable organomegaly, no guarding, no rigidity, no rebound tenderness.  : No flank or supra pubic tenderness.  EXTREMITIES: Peripheral pulses are palpable, no pedal edema, no calf tenderness  Musculoskeletal: No joint or spinal tenderness  Neurology: AAOx3, no focal neurological deficit, Motor and sensory systems are intact.  SKIN: No rash or skin lesion    LABS:      141  |  115<H>  |  83<H>  ----------------------------<  94  4.8   |  16<L>  |  4.86<H>    Ca    8.5      2023 05:25  Phos  3.7       Mg     2.1         TPro  8.3  /  Alb  3.6  /  TBili  0.4  /  DBili      /  AST  25  /  ALT  21  /  AlkPhos  60      Creatinine Trend: 4.86 <--, 5.04 <--, 5.31 <--, 5.75 <--, 6.18 <--, 6.88 <--                        7.6    9.37  )-----------( 200      ( 2023 05:25 )             22.5     Urine Studies:  Urinalysis Basic - ( 2023 00:42 )    Color: Yellow / Appearance: Clear / S.010 / pH:   Gluc:  / Ketone: Negative  / Bili: Negative / Urobili: Negative   Blood:  / Protein: 30 mg/dL / Nitrite: Negative   Leuk Esterase: Negative / RBC: 2-5 /HPF / WBC 0-2 /HPF   Sq Epi:  / Non Sq Epi:  / Bacteria: Trace /HPF      Sodium, Random Urine: 44 mmol/L ( @ 18:20)  Creatinine, Random Urine: 96 mg/dL ( @ 18:20)  Creatinine, Random Urine: 117 mg/dL ( @ 14:10)  Sodium, Random Urine: 34 mmol/L ( @ 14:10)      RADIOLOGY & ADDITIONAL STUDIES:    EKG findings reviewed.    Imaging Personally Reviewed:  [x] YES  [ ] NO    Consultant(s) and primary physician Notes Reviewed:  [x] YES  [ ] NO    Care Discussed with Primary team/ Consultants/Other Providers [x] YES  [ ] NO           Rafael Bradshaw MD (Nephrology)  [address], [facility],  [address],  [facility]  TEl: [phone]  Cell: [phone]    Patient is a 87y old  Male who presents with a chief complaint of Hyperkalemia (2023 01:00)      HPI:  87 year old M from home with no known PMH presents with urinary retention. Patient reports since being discharged he has had very little urine output. Denies any blood in urine or pain. Denies fever, chills, cp, sob, palpitations, lightheadedness, dizziness, n/v/d, abdominal pain, constipation. Reports significant weight loss and poor oral intake as he does not have teeth and his appetite is deteriorating, reports one episode of loose stool on Wednesday AM.    Of note, pt was admitted to [facility] ICU  and left AMA  prior to completion of trial of void. Was admitted for acute hypoxic respiratory failure 2/2 pulmonary fibrosis, CT shows diffuse lung fibrosis, pneumomediastinum, mild fullness of bilateral renal pelvis without stones, bladder wall thickening    ED Course  Vitals: 152/82 P 105 R 18 T 97.5 F SpO2 100% RA  Meds: 500 cc NS, D50, insulin 2.5 U, lokelma 10 mg  EKG: NSR with peaked t waves (2023 01:00)      PAST MEDICAL & SURGICAL HISTORY:  No pertinent past medical history      No significant past surgical history            Allergies:  No Known Allergies      Home Medications:   acetaminophen     Tablet .. 650 milliGRAM(s) Oral every 6 hours PRN  chlorhexidine 2% Cloths 1 Application(s) Topical <User Schedule>  heparin   Injectable 5000 Unit(s) SubCutaneous every 12 hours  tamsulosin 0.4 milliGRAM(s) Oral at bedtime      Hospital Medications:   MEDICATIONS  (STANDING):  chlorhexidine 2% Cloths 1 Application(s) Topical <User Schedule>  heparin   Injectable 5000 Unit(s) SubCutaneous every 12 hours  tamsulosin 0.4 milliGRAM(s) Oral at bedtime      SOCIAL HISTORY:  Denies ETOh, Smoking,     Family History:  FAMILY HISTORY:      ROS:  Limited  Alert and awake in no distress  Denies any chest pain, SOB  Mild supra pubic discomfort  Denies any hematuria, flank pain    VITALS:  T(F): 97.5 (23 @ 07:30), Max: 98.2 (23 @ 02:02)  HR: 96 (23 @ 07:30)  BP: 138/80 (23 @ 07:30)  RR: 18 (23 @ 07:30)  SpO2: 95% (23 @ 07:30)  Wt(kg): --    Height (cm): 170.2 ( @ 18:18)  Weight (kg): 49.9 ( @ 18:18)  BMI (kg/m2): 17.2 ( @ 18:18)  BSA (m2): 1.57 ( @ 18:18)  CAPILLARY BLOOD GLUCOSE            PHYSICAL EXAM:  GENERAL: Alert, awake, oriented x2-3   HEENT: MAGALYS, EOMI, neck supple, no JVP  CHEST/LUNG: Bilateral clear breath sounds, no rales, no crepitations, no wheezing  HEART: Regular rate and rhythm, CHANTAL II/VI at LPSB, no gallops, no rub   ABDOMEN: Soft, nontender, non distended, bowel sounds present, no palpable organomegaly, no guarding, no rigidity, no rebound tenderness.  : No flank or supra pubic tenderness, Lomax's catheter in place  EXTREMITIES: Peripheral pulses are palpable, no pedal edema, no calf tenderness  Musculoskeletal: No joint or spinal tenderness  Neurology: AAOx2-3, no focal neurological deficit, Motor and sensory systems are intact.  SKIN: No rash or skin lesion    LABS:      141  |  115<H>  |  83<H>  ----------------------------<  94  4.8   |  16<L>  |  4.86<H>    Ca    8.5      2023 05:25  Phos  3.7       Mg     2.1         TPro  8.3  /  Alb  3.6  /  TBili  0.4  /  DBili      /  AST  25  /  ALT  21  /  AlkPhos  60      Creatinine Trend: 4.86 <--, 5.04 <--, 5.31 <--, 5.75 <--, 6.18 <--, 6.88 <--                        7.6    9.37  )-----------( 200      ( 2023 05:25 )             22.5     Urine Studies:  Urinalysis Basic - ( 2023 00:42 )    Color: Yellow / Appearance: Clear / S.010 / pH:   Gluc:  / Ketone: Negative  / Bili: Negative / Urobili: Negative   Blood:  / Protein: 30 mg/dL / Nitrite: Negative   Leuk Esterase: Negative / RBC: 2-5 /HPF / WBC 0-2 /HPF   Sq Epi:  / Non Sq Epi:  / Bacteria: Trace /HPF      Sodium, Random Urine: 44 mmol/L ( @ 18:20)  Creatinine, Random Urine: 96 mg/dL ( @ 18:20)  Creatinine, Random Urine: 117 mg/dL ( @ 14:10)  Sodium, Random Urine: 34 mmol/L ( @ 14:10)      RADIOLOGY & ADDITIONAL STUDIES:  rad< from: CT Abdomen and Pelvis No Cont (23 @ 22:33) >    ACC: [id number] EXAM:  CT ABDOMEN AND PELVIS   ORDERED BY: MICHAEL JEFFERSON     PROCEDURE DATE:  2023          INTERPRETATION:  CLINICAL INFORMATION: Abdominal pain and diarrhea.    COMPARISON: CT torso 2023.    PROCEDURE:  CT of the Abdomenand Pelvis was performed without intravenous contrast.  Intravenous contrast: None.  Oral contrast: None.  Sagittal and coronal reformats were performed.    FINDINGS: Absence of intravenous contrast limits evaluation of   vasculature and solid organs.    LOWER CHEST: Redemonstrated pneumomediastinum extending to retrocrural   spaces. Continued findings of diffuse lung fibrosis with   peripheral/basilar reticular opacities and honeycombing.    LIVER: Within normal limits.  BILE DUCTS: Normal caliber.  GALLBLADDER: Within normal limits.  SPLEEN: Within normal limits.  PANCREAS: Within normal limits.  ADRENALS: Within normal limits.  KIDNEYS/URETERS: Mild fullness of bilateral renal pelvis without   calcified stone identified along the course the ureter.    BLADDER: Wall thickening, difficult to assess secondary compression   containing Lomax catheter.  REPRODUCTIVE ORGANS: Prominent prostate.    BOWEL: No acute bowel inflammatory changes or obstruction. Nonvisualized   appendix. Diverticulosis coli. Inadequately distended stomach, limiting   detailed evaluation.  PERITONEUM: No ascites.  VESSELS:  Atherosclerotic changes.  RETROPERITONEUM: No lymphadenopathy.  ABDOMINAL WALL: Small bilateral groin hernias containing fluid.  BONES: Degenerative changes.    IMPRESSION:    No acute bowel inflammatory change or obstruction.    Mild fullness of bilateral renal pelvis without calcified stone   identified along the course the ureter.    Bladder wall thickening, difficult to assess secondary compression   containing Lomax catheter. Correlate with urinalysis and lab values to   assess for cystitis and/or ascending urinary tract infection.    Redemonstrated pneumomediastinum.    Continued findings of diffuse lung fibrosis with peripheral/basilar   reticular opacities and honeycombing.    --- End of Report ---            ADDY CALERO MD; Attending Radiologist  This document has been electronically signed. 2023 11:51PM    < end of copied text >      EKG findings reviewed.    Imaging Personally Reviewed:  [x] YES  [ ] NO    Consultant(s) and primary physician Notes Reviewed:  [x] YES  [ ] NO    Care Discussed with Primary team/ Consultants/Other Providers [x] YES  [ ] NO

## 2023-04-27 NOTE — H&P ADULT - PROBLEM SELECTOR PLAN 2
patient presents with elevated Cr to 5.3, unclear baseline however recently elevated up to 6.8 and improving since prior admission  associated with urinary retention  BUN/Cr 92/5.3 > 84/5.0  bajwa placed by urology overnight per patient request in ED  CT shows renal pelvis fullness of bilateral renal pelvis without calcified stone identified along the course the ureter. Bladder wall thickening.    - maintain bajwa  - monitor Cr  - patient presents with elevated Cr to 5.3, unclear baseline however recently elevated up to 6.8 and improving since prior admission  associated with urinary retention  BUN/Cr 92/5.3 > 84/5.0  bajwa placed by urology overnight per patient request in ED  CT shows renal pelvis fullness of bilateral renal pelvis without calcified stone identified along the course the ureter. Bladder wall thickening.    - maintain bajwa  - monitor Cr  - avoid nephrotoxic agents

## 2023-04-27 NOTE — H&P ADULT - ASSESSMENT
CT A/P: No acute bowel inflammatory change or obstruction. Mild fullness of bilateral renal pelvis without calcified stone   identified along the course the ureter. Bladder wall thickening, difficult to assess secondary compression containing Lomax catheter. Correlate with urinalysis and lab values to assess for cystitis and/or ascending urinary tract infection.  Redemonstrated pneumomediastinum.  Continued findings of diffuse lung fibrosis with peripheral/basilar reticular opacities and honeycombing.   CT A/P: No acute bowel inflammatory change or obstruction. Mild difficult to assess secondary compression containing Lomax catheter. Correlate with urinalysis and lab values to assess for cystitis and/or ascending urinary tract infection.   87 year old M from home with PMH of pulmonary fibrosis presents with urinary retention and acute hyperkalemia admitted to telemetry for management of hyperkalemia and SANDI

## 2023-04-27 NOTE — ED PROVIDER NOTE - OBJECTIVE STATEMENT
87-year-old male coming from home presents with abdominal discomfort.  States he last peed earlier this morning.  Thinks he is retaining.  Patient is a retired physician himself.  Also had an episode of diarrhea earlier this morning.  No fever, chills, chest pain, shortness of breath, nausea, vomiting.

## 2023-04-27 NOTE — H&P ADULT - PROBLEM SELECTOR PLAN 1
pt presents with elevated K, urinary retention after failing trial of void pt presents with elevated K, urinary retention after failing trial of void  improved after 500 cc NS, D50, insulin 2.5 U, lokelma 10 mg given by ED  EKG shows peaked t waves    - monitor K, avoid nephrotoxic agents  - telemetry monitoring

## 2023-04-27 NOTE — PATIENT PROFILE ADULT - FOOD INSECURITY
ESTABLISHED PATIENT    Ms. Estelle Cowart is a 19 y.o. 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 Estelle Cowart presents with no complaints of arthralgias.  She denies prolonged morning stiffness, shortness of breath, dyspnea, or fatigue.  She denies hair loss and feels hair is growing back.    History of low CH 50 and C4  The C4 is still low but improving. C3 normal and CH50 normal  7/10/18  We have not rechecked this    Current Medications:  Hydroxychloroquine 200 mg daily  Has not had her ophthalmology baseline evaluation    Elevated LFT  Normalized with labs from 8/30/2018  F-actin and AMA antibody is negative      Raynauds  Scl 70 and Galeas antibody and RNP antibody are negative  Denies any episodes of Raynauds        RHEUM HISTORY    Ms. Estelle Cowart presents today for evaluation for positive DENILSON in the context of positive SSA, Galeas, RNP low C4 as well as a recent diagnosis of TTP.     The patient recalls that she started to feel ill in November with no improvement.  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  While at [place] she was found to have positive serologies DENILSON 1:2560 and positive anti Ro, anti Galeas and Anti RNP.          Other associated symptoms she has experienced include decrease appetite, mild bruising, headaches associated with her episodes of low platelet,  and seen at [place] and treated with Rituximab and Plasmapheresis.         She does report a history of Raynauds since childhood.  Her fingers will  turn white and purple extending from the distal fingers up to the PIP joint.   Toes will also white.    She also reports hair loss that started after her hospitilization.  No sicca symptoms.  No rashes.  No mouth ulcers.      When we first met she had hair loss and prolonged morning stiffness      Pertinent Positives  FARA IgG positive, FARA C3d negative; ADAMTS acitivty     Results for BERNARDINO RIBERA (MRN [id number]) as of 5/23/2018 23:09   Ref. Range 4/3/2018 14:40 4/19/2018 12:47   Anti-Scl-70 Latest Ref Range: 0 - 40 AU/mL  5   Anti-Centromere B Ab Latest Ref Range: 0 - 40 AU/mL  2   Antinuclear Antibody Latest Ref Range: None Detected  Detected (A)    Rnp Antibodies Latest Ref Range: 0 - 40 AU/mL 224 (H)    Galeas Antibodies Latest Ref Range: 0 - 40 AU/mL 180 (H)    SSA 52 (R0)(LEONARD) Ab, IgG Latest Ref Range: 0 - 40 AU/mL 37    SSA 60 (R0)(LEONARD) Ab, IgG Latest Ref Range: 0 - 40 AU/mL 138 (H)    Sjogren'S Anti-Ss-B Latest Ref Range: 0 - 40 AU/mL 11    DENILSON Titer Latest Ref Range: <1:80  >1:2560 (H)    Dble Strand DNA Ab Titer Latest Ref Range: <1:10   1:80 (H)       Past Medical History: vitiligo, TTP, MCTD     Family History: hyperlipidemia; grandmother had diabetes     Social History: NEVER smoker, no alcohol      Current Outpatient Prescriptions on File Prior to Visit   Medication Sig Dispense Refill   • hydroxychloroquine (PLAQUENIL) 200 MG Tab Take 1 Tab by mouth every day. 30 Tab 1   • acetaminophen (TYLENOL) 325 MG Tab Take 650 mg by mouth every four hours as needed.     • ondansetron (ZOFRAN ODT) 8 MG TABLET DISPERSIBLE Take 1 Tab by mouth every 8 hours as needed for Nausea. 15 Tab 0     No current facility-administered medications on file prior to visit.        REVIEW OF SYSTEMS  Review of Systems   Constitutional: Negative for chills, fever and malaise/fatigue.   Eyes: Negative for blurred vision and pain.        No dry eyes   Cardiovascular: Negative for chest pain and palpitations.   Gastrointestinal:  Negative for heartburn and nausea.   Musculoskeletal: Positive for back pain. Negative for joint pain.   Skin: Negative for rash.       PHYSICAL EXAM  Ambulatory Vitals  Vitals:    09/12/18 1541   BP: (!) 84/70   Pulse: 84   Resp: 16   Temp: 36.9 °C (98.4 °F)   SpO2: 98%   Weight: 54.4 kg (120 lb)     Physical Exam   Constitutional: She is oriented to person, place, and time and well-developed, well-nourished, and in no distress. No distress.   HENT:   Head: Normocephalic and atraumatic.   Right Ear: External ear normal.   Left Ear: External ear normal.   Mouth/Throat: Oropharynx is clear and moist.   no oral ulcers   Eyes: Conjunctivae and EOM are normal. Right eye exhibits no discharge. Left eye exhibits no discharge. No scleral icterus.   Cardiovascular: Normal rate, regular rhythm and normal heart sounds.    No murmur heard.  Pulmonary/Chest: Effort normal. No stridor. No respiratory distress. She has no wheezes. She has no rales.   Musculoskeletal: She exhibits no edema.   No active synovitis or tenderness of the MCP or PIP or wrists bilateral.  Full extension of the elbow bilateral.  Pain on palpation midline in the lumbar spine and at the level of L5.  Negative JENIFER bilateral     Lymphadenopathy:     She has no cervical adenopathy.   Neurological: She is alert and oriented to person, place, and time. Gait normal. GCS score is 15.   Skin: Skin is warm and dry. No rash noted. She is not diaphoretic. No erythema.   No malar rash   Psychiatric: Mood, memory, affect and judgment normal.   Vitals reviewed.    DIAGNOSTICS:    Labs    Lab Results   Component Value Date/Time    QNTTBGOLD Negative 04/20/2018 01:35 PM     Lab Results   Component Value Date/Time    HEPBCORIGM Negative 12/28/2017 09:00 AM    HEPBSAG Negative 12/28/2017 09:00 AM     Lab Results   Component Value Date/Time    HEPCAB Negative 12/28/2017 09:00 AM     Lab Results   Component Value Date/Time    SODIUM 136 07/10/2018 04:35 PM    POTASSIUM 3.8  07/10/2018 04:35 PM    CHLORIDE 103 07/10/2018 04:35 PM    CO2 22 07/10/2018 04:35 PM    GLUCOSE 87 07/10/2018 04:35 PM    BUN 14 08/20/2018 02:20 PM    CREATININE 0.54 08/20/2018 02:20 PM      Lab Results   Component Value Date/Time    WBC 4.9 08/20/2018 02:20 PM    RBC 4.55 08/20/2018 02:20 PM    HEMOGLOBIN 12.9 08/20/2018 02:20 PM    HEMATOCRIT 37.8 08/20/2018 02:20 PM    MCV 83.1 08/20/2018 02:20 PM    MCH 28.4 08/20/2018 02:20 PM    MCHC 34.1 08/20/2018 02:20 PM    MPV 10.4 08/20/2018 02:20 PM    NEUTSPOLYS 71.00 08/20/2018 02:20 PM    LYMPHOCYTES 14.50 (L) 08/20/2018 02:20 PM    MONOCYTES 13.10 08/20/2018 02:20 PM    EOSINOPHILS 0.80 08/20/2018 02:20 PM    BASOPHILS 0.20 08/20/2018 02:20 PM    ANISOCYTOSIS 1+ 01/17/2018 04:30 AM      Lab Results   Component Value Date/Time    CALCIUM 9.7 07/10/2018 04:35 PM    ASTSGOT 36 08/20/2018 02:20 PM    ALTSGPT 26 08/20/2018 02:20 PM    ALKPHOSPHAT 87 08/20/2018 02:20 PM    TBILIRUBIN 0.2 08/20/2018 02:20 PM    ALBUMIN 4.1 08/20/2018 02:20 PM    TOTPROTEIN 8.0 08/20/2018 02:20 PM     Lab Results   Component Value Date/Time    RHEUMFACTN 12 04/03/2018 02:40 PM    ANTINUCAB Detected (A) 04/03/2018 02:40 PM     Lab Results   Component Value Date/Time    SEDRATEWES 32 (H) 07/10/2018 04:35 PM    CREACTPROT 0.54 07/10/2018 04:35 PM     Lab Results   Component Value Date/Time    RUSSELVIPER 35.1 04/20/2018 01:35 PM    DRVVTINTERP Not Present 04/20/2018 01:35 PM     Lab Results   Component Value Date/Time    J5FMYYNDZBP 106.0 07/10/2018 04:35 PM    H0JQOSPRIOY 14.0 (L) 07/10/2018 04:35 PM    IGGCARDIOLI 2 04/20/2018 01:35 PM    IGMCARDIOLI 0 04/20/2018 01:35 PM    IGACARDIOLI 0 04/20/2018 01:35 PM     Lab Results   Component Value Date/Time    ANTIDNADS Detected (A) 07/10/2018 04:35 PM    RNPAB 224 (H) 04/03/2018 02:40 PM    RNPAB 217 (H) 04/03/2018 02:40 PM    SMITHAB 180 (H) 04/03/2018 02:40 PM    SMITHAB 167 (H) 04/03/2018 02:40 PM    IWNIGTX66 5 04/19/2018 12:47 PM     CENTROMBAB 2 04/19/2018 12:47 PM     Lab Results   Component Value Date/Time    ANTIDNADS Detected (A) 07/10/2018 04:35 PM    DSDNA 1:20 (H) 07/10/2018 04:35 PM    I3JOVZQTKOZ 106.0 07/10/2018 04:35 PM    RNPAB 224 (H) 04/03/2018 02:40 PM    RNPAB 217 (H) 04/03/2018 02:40 PM    ANTISSBSJ 11 04/03/2018 02:40 PM    ANTISSBSJ 11 04/03/2018 02:40 PM     Lab Results   Component Value Date/Time    COLORURINE Yellow 07/10/2018 04:35 PM    SPECGRAVITY 1.014 07/10/2018 04:35 PM    PHURINE 6.5 07/10/2018 04:35 PM    GLUCOSEUR Negative 07/10/2018 04:35 PM    KETONES Negative 07/10/2018 04:35 PM    PROTEINURIN Negative 07/10/2018 04:35 PM     No results found for: TOTPROTUR, TOTALVOLUME, KETMZSHO10  Lab Results   Component Value Date/Time    SSA60 138 (H) 04/03/2018 02:40 PM    SSA60 142 (H) 04/03/2018 02:40 PM    SSA52 37 04/03/2018 02:40 PM    SSA52 32 04/03/2018 02:40 PM     No results found for: HBA1C  No results found for: CPKTOTAL  Lab Results   Component Value Date/Time    G6PD 12.3 04/20/2018 01:35 PM     No results found for: CJOP95OUUV  No results found for: ACESERUM  No results found for: 25HYDROXY  No results found for: TSH, FREEDIR  Lab Results   Component Value Date/Time    TSHULTRASEN 1.350 04/20/2018 01:35 PM    FREET4 0.87 04/20/2018 01:35 PM     Lab Results   Component Value Date/Time    MICROSOMALA 1.3 04/20/2018 01:35 PM    ANTITHYROGL <0.9 04/20/2018 01:35 PM     No results found for: IGGLYMEABS  Lab Results   Component Value Date/Time    ANTIMITOCHO 4.5 05/03/2018 07:04 AM    FACTIN 14 08/20/2018 02:20 PM     No results found for: IGA, TTRANSIGA, ENDOIGA  No results found for: FLTYPE, CRYSTALSBDF  No results found for: ISTATICAL  No results found for: ISTATCREAT  No results found for: CTELOPEP  No results found for: GBMABG  No results found for: PTHINTACT          Imaging          ASSESSMENT AND PLAN:  Ms. Estelle Cowart presents for follow-up of SLE vs MCTD with positive DENILSON, positive dsDNA,  Raynauds      SLE vs MCTD  Doing well on plaquenil  Emphasized with patient and father that we need her to have a baseline ophthalmology exam.  I reviewed the side effects of hydroxychloroquine including but not limited to headache, retinal toxicity, skin rash.      I reviewed the side effects of hydroxychloroquine including but not limited to headache, retinal toxicity, skin rash.  I emphasized the importance of baseline and annual follow-up with evaluation with ophthalmology.    Left elbow swelling  Xray was normal  Elbow swelling improved with plaquenil    Elevate ALT  On recheck this was negative  However most recent labs it is positive again   hepatitis B and C are negative  If persistent on  Re-check will get ultrasound   Anti mitochondrial antibody and F-actin are negative    Low back pain  Worse in the morning  Pain is intermittent  Will send for physical therapy x 6 weeks and if persistent she has labs to get xray done    1. MCTD (mixed connective tissue disease) (HCC)  ANTI-DSDNA ANTIBODIES    COMPLEMENT C3    COMPLEMENT C4    COMPLEMENT TOTAL (CH50)    WESTERGREN SED RATE    URINALYSIS    CBC WITH DIFFERENTIAL    BUN    CRP QUANTITIVE (NON-CARDIAC)    HEPATIC FUNCTION PANEL    WESTERGREN SED RATE    CREATININE    ECHOCARDIOGRAM COMP W/O CONT    REFERRAL TO FAMILY PRACTICE   2. Raynaud's disease without gangrene  ANTI-DSDNA ANTIBODIES    COMPLEMENT C3    COMPLEMENT C4    COMPLEMENT TOTAL (CH50)    WESTERGREN SED RATE    URINALYSIS    CBC WITH DIFFERENTIAL    BUN    CRP QUANTITIVE (NON-CARDIAC)    HEPATIC FUNCTION PANEL    WESTERGREN SED RATE    CREATININE    REFERRAL TO FAMILY PRACTICE   3. Low back pain, unspecified back pain laterality, unspecified chronicity, with sciatica presence unspecified  REFERRAL TO PHYSICAL THERAPY Reason for Therapy: Eval/Treat/Report    DX-LUMBAR SPINE-2 OR 3 VIEWS    HCG QUALITATIVE UR   4. Systemic lupus erythematosus, unspecified SLE type, unspecified organ involvement  status (HCC)       No Follow-up on file.        she agreed and verbalized she agreement and understanding with the current plan. I answered all questions and concerns she has at this time and advised our patient to call at any time in there interim with questions or concerns in regards she care.     Thank you for allowing me to participate in the care of this patient, I will continue to follow closely.      Please note that this dictation was created using voice recognition software. I have made every reasonable attempt to correct obvious errors, but I expect that there are errors of grammar and possibly content that I did not discover before finalizing the note.        no

## 2023-04-27 NOTE — H&P ADULT - HISTORY OF PRESENT ILLNESS
ED Course  Vitals: 152/82 P 105 R 18 T 97.5 F SpO2 100% RA  Meds: 500 cc NS, D50, insulin 2.5 U, lokelma 10 mg  EKG: NSR with peaked t waves    CT A/P: No acute bowel inflammatory change or obstruction. Mild fullness of bilateral renal pelvis without calcified stone   identified along the course the ureter. Bladder wall thickening, difficult to assess secondary compression containing Lomax catheter. Correlate with urinalysis and lab values to assess for cystitis and/or ascending urinary tract infection.  Redemonstrated pneumomediastinum.  Continued findings of diffuse lung fibrosis with peripheral/basilar reticular opacities and honeycombing. 87 year old M from home with no known PMH presents with urinary retention. Pt was admitted to     ED Course  Vitals: 152/82 P 105 R 18 T 97.5 F SpO2 100% RA  Meds: 500 cc NS, D50, insulin 2.5 U, lokelma 10 mg  EKG: NSR with peaked t waves    CT A/P: No acute bowel inflammatory change or obstruction. Mild fullness of bilateral renal pelvis without calcified stone   identified along the course the ureter. Bladder wall thickening, difficult to assess secondary compression containing Lomax catheter. Correlate with urinalysis and lab values to assess for cystitis and/or ascending urinary tract infection.  Redemonstrated pneumomediastinum.  Continued findings of diffuse lung fibrosis with peripheral/basilar reticular opacities and honeycombing. 87 year old M from home with no known PMH presents with urinary retention.     Of note, pt was admitted to [facility] ICU 4/23 and left AMA 4/24 without trial of void. Was admitted for acute hypoxic respiratory failure 2/2     ED Course  Vitals: 152/82 P 105 R 18 T 97.5 F SpO2 100% RA  Meds: 500 cc NS, D50, insulin 2.5 U, lokelma 10 mg  EKG: NSR with peaked t waves    CT A/P: No acute bowel inflammatory change or obstruction. Mild fullness of bilateral renal pelvis without calcified stone   identified along the course the ureter. Bladder wall thickening, difficult to assess secondary compression containing Lomax catheter. Correlate with urinalysis and lab values to assess for cystitis and/or ascending urinary tract infection.  Redemonstrated pneumomediastinum.  Continued findings of diffuse lung fibrosis with peripheral/basilar reticular opacities and honeycombing. 87 year old M from home with no known PMH presents with urinary retention. Patient reports since being discharged he has had very little urine output. Denies any blood in urine or pain. Denies fever, chills, cp, sob, palpitations, lightheadedness, dizziness, n/v/d, abdominal pain, constipation.     Of note, pt was admitted to [facility] ICU 4/23 and left AMA 4/24 prior to competion of trial of void. Was admitted for acute hypoxic respiratory failure 2/2 pulmonary fibrosis, CT shows diffuse lung fibrosis, pneumomediastinum, mild fullness of bilateral renal pelvis without stones, bladder wall thickening    ED Course  Vitals: 152/82 P 105 R 18 T 97.5 F SpO2 100% RA  Meds: 500 cc NS, D50, insulin 2.5 U, lokelma 10 mg  EKG: NSR with peaked t waves    CT A/P: No acute bowel inflammatory change or obstruction. Mild fullness of bilateral renal pelvis without calcified stone   identified along the course the ureter. Bladder wall thickening, difficult to assess secondary compression containing Lomax catheter. Correlate with urinalysis and lab values to assess for cystitis and/or ascending urinary tract infection.  Redemonstrated pneumomediastinum.  Continued findings of diffuse lung fibrosis with peripheral/basilar reticular opacities and honeycombing. 87 year old M from home with no known PMH presents with urinary retention. Patient reports since being discharged he has had very little urine output. Denies any blood in urine or pain. Denies fever, chills, cp, sob, palpitations, lightheadedness, dizziness, n/v/d, abdominal pain, constipation. Reports significant weight loss and poor oral intake as he does not have teeth and his appetite is deteriorating    Of note, pt was admitted to [facility] ICU 4/23 and left AMA 4/24 prior to completion of trial of void. Was admitted for acute hypoxic respiratory failure 2/2 pulmonary fibrosis, CT shows diffuse lung fibrosis, pneumomediastinum, mild fullness of bilateral renal pelvis without stones, bladder wall thickening    ED Course  Vitals: 152/82 P 105 R 18 T 97.5 F SpO2 100% RA  Meds: 500 cc NS, D50, insulin 2.5 U, lokelma 10 mg  EKG: NSR with peaked t waves 87 year old M from home with no known PMH presents with urinary retention. Patient reports since being discharged he has had very little urine output. Denies any blood in urine or pain. Denies fever, chills, cp, sob, palpitations, lightheadedness, dizziness, n/v/d, abdominal pain, constipation. Reports significant weight loss and poor oral intake as he does not have teeth and his appetite is deteriorating, reports one episode of loose stool on Wednesday AM.    Of note, pt was admitted to [facility] ICU 4/23 and left AMA 4/24 prior to completion of trial of void. Was admitted for acute hypoxic respiratory failure 2/2 pulmonary fibrosis, CT shows diffuse lung fibrosis, pneumomediastinum, mild fullness of bilateral renal pelvis without stones, bladder wall thickening    ED Course  Vitals: 152/82 P 105 R 18 T 97.5 F SpO2 100% RA  Meds: 500 cc NS, D50, insulin 2.5 U, lokelma 10 mg  EKG: NSR with peaked t waves

## 2023-04-27 NOTE — PATIENT PROFILE ADULT - FALL HARM RISK - HARM RISK INTERVENTIONS

## 2023-04-27 NOTE — PROGRESS NOTE ADULT - PROBLEM SELECTOR PLAN 3
plan as above pt with NAGMA, similar to previous admission  HCO3 16 likely due to acute renal failure  - compensatory respiratory alkalosis  - off BIPAP will repeat ABG pt with NAGMA, similar to previous admission  HCO3 16 likely due to acute renal failure  Obtain ABG

## 2023-04-27 NOTE — PROGRESS NOTE ADULT - PROBLEM SELECTOR PLAN 1
pt presents with elevated K, urinary retention after failing trial of void  improved after 500 cc NS, D50, insulin 2.5 U, lokelma 10 mg given by ED  EKG shows peaked t waves    - monitor K, avoid nephrotoxic agents  - telemetry monitoring pt presents with elevated K, urinary retention after failing trial of void  improved after 500 cc NS, D50, insulin 2.5 U, lokelma 10 mg given by ED  EKG shows peaked t waves    - monitor K, avoid nephrotoxic agents  - f/u repeat EKG  - telemetry monitoring  - Resolved* patient presents with elevated Cr to 5.3, unclear baseline however recently elevated up to 6.8 and improving since prior admission  associated with urinary retention  bajwa placed by urology overnight per patient request in ED  CT shows renal pelvis fullness of bilateral renal pelvis without calcified stone identified along the course the ureter. Bladder wall thickening. Prostate Enlargement.    - maintain bajwa  - monitor Cr  - avoid nephrotoxic agents  - Cr improving  - NephroDr. Bradshaw following

## 2023-04-27 NOTE — H&P ADULT - ATTENDING COMMENTS
87M from home with PMH of pulmonary fibrosis recent ICU admission with pneumothorax and urinary retention then leaving AMA presents with urinary retention and abdominal discomfort found to have acute hyperkalemia and SANDI. S/p bajwa placement in ER.  -Trend BMP  -Renal dose medications  -Urology/nephrology consult  -Start flomax  -Cont. bajwa, strict I/Os  -Trend BMP, Mg  -Falls precautions  -DVT PPx, Hep subq

## 2023-04-28 LAB
ALBUMIN SERPL ELPH-MCNC: 3.1 G/DL — LOW (ref 3.5–5)
ALP SERPL-CCNC: 54 U/L — SIGNIFICANT CHANGE UP (ref 40–120)
ALT FLD-CCNC: 27 U/L DA — SIGNIFICANT CHANGE UP (ref 10–60)
ANION GAP SERPL CALC-SCNC: 7 MMOL/L — SIGNIFICANT CHANGE UP (ref 5–17)
AST SERPL-CCNC: 49 U/L — HIGH (ref 10–40)
BILIRUB SERPL-MCNC: 0.5 MG/DL — SIGNIFICANT CHANGE UP (ref 0.2–1.2)
BUN SERPL-MCNC: 70 MG/DL — HIGH (ref 7–18)
CALCIUM SERPL-MCNC: 8.3 MG/DL — LOW (ref 8.4–10.5)
CHLORIDE SERPL-SCNC: 107 MMOL/L — SIGNIFICANT CHANGE UP (ref 96–108)
CO2 SERPL-SCNC: 21 MMOL/L — LOW (ref 22–31)
CREAT SERPL-MCNC: 4.36 MG/DL — HIGH (ref 0.5–1.3)
CULTURE RESULTS: NO GROWTH — SIGNIFICANT CHANGE UP
CULTURE RESULTS: SIGNIFICANT CHANGE UP
CULTURE RESULTS: SIGNIFICANT CHANGE UP
EGFR: 13 ML/MIN/1.73M2 — LOW
GLUCOSE SERPL-MCNC: 90 MG/DL — SIGNIFICANT CHANGE UP (ref 70–99)
HCT VFR BLD CALC: 24.8 % — LOW (ref 39–50)
HGB BLD-MCNC: 8.5 G/DL — LOW (ref 13–17)
M PNEUMO IGM SER-ACNC: 0.26 INDEX — SIGNIFICANT CHANGE UP (ref 0–0.9)
MAGNESIUM SERPL-MCNC: 1.9 MG/DL — SIGNIFICANT CHANGE UP (ref 1.6–2.6)
MCHC RBC-ENTMCNC: 31.4 PG — SIGNIFICANT CHANGE UP (ref 27–34)
MCHC RBC-ENTMCNC: 34.3 GM/DL — SIGNIFICANT CHANGE UP (ref 32–36)
MCV RBC AUTO: 91.5 FL — SIGNIFICANT CHANGE UP (ref 80–100)
MYCOPLASMA AG SPEC QL: NEGATIVE — SIGNIFICANT CHANGE UP
NRBC # BLD: 0 /100 WBCS — SIGNIFICANT CHANGE UP (ref 0–0)
PHOSPHATE SERPL-MCNC: 3.6 MG/DL — SIGNIFICANT CHANGE UP (ref 2.5–4.5)
PLATELET # BLD AUTO: 200 K/UL — SIGNIFICANT CHANGE UP (ref 150–400)
POTASSIUM SERPL-MCNC: 4.2 MMOL/L — SIGNIFICANT CHANGE UP (ref 3.5–5.3)
POTASSIUM SERPL-SCNC: 4.2 MMOL/L — SIGNIFICANT CHANGE UP (ref 3.5–5.3)
PROT SERPL-MCNC: 7.1 G/DL — SIGNIFICANT CHANGE UP (ref 6–8.3)
RBC # BLD: 2.71 M/UL — LOW (ref 4.2–5.8)
RBC # FLD: 14 % — SIGNIFICANT CHANGE UP (ref 10.3–14.5)
SODIUM SERPL-SCNC: 135 MMOL/L — SIGNIFICANT CHANGE UP (ref 135–145)
SPECIMEN SOURCE: SIGNIFICANT CHANGE UP
WBC # BLD: 7.04 K/UL — SIGNIFICANT CHANGE UP (ref 3.8–10.5)
WBC # FLD AUTO: 7.04 K/UL — SIGNIFICANT CHANGE UP (ref 3.8–10.5)

## 2023-04-28 PROCEDURE — 99233 SBSQ HOSP IP/OBS HIGH 50: CPT

## 2023-04-28 PROCEDURE — 99223 1ST HOSP IP/OBS HIGH 75: CPT

## 2023-04-28 RX ORDER — FINASTERIDE 5 MG/1
5 TABLET, FILM COATED ORAL DAILY
Refills: 0 | Status: DISCONTINUED | OUTPATIENT
Start: 2023-04-28 | End: 2023-05-08

## 2023-04-28 RX ORDER — IPRATROPIUM/ALBUTEROL SULFATE 18-103MCG
3 AEROSOL WITH ADAPTER (GRAM) INHALATION ONCE
Refills: 0 | Status: COMPLETED | OUTPATIENT
Start: 2023-04-28 | End: 2023-04-28

## 2023-04-28 RX ADMIN — BUDESONIDE AND FORMOTEROL FUMARATE DIHYDRATE 2 PUFF(S): 160; 4.5 AEROSOL RESPIRATORY (INHALATION) at 11:34

## 2023-04-28 RX ADMIN — BUDESONIDE AND FORMOTEROL FUMARATE DIHYDRATE 2 PUFF(S): 160; 4.5 AEROSOL RESPIRATORY (INHALATION) at 23:09

## 2023-04-28 RX ADMIN — Medication 650 MILLIGRAM(S): at 21:16

## 2023-04-28 RX ADMIN — CHLORHEXIDINE GLUCONATE 1 APPLICATION(S): 213 SOLUTION TOPICAL at 06:22

## 2023-04-28 RX ADMIN — HEPARIN SODIUM 5000 UNIT(S): 5000 INJECTION INTRAVENOUS; SUBCUTANEOUS at 17:08

## 2023-04-28 RX ADMIN — TIOTROPIUM BROMIDE 2 PUFF(S): 18 CAPSULE ORAL; RESPIRATORY (INHALATION) at 12:53

## 2023-04-28 RX ADMIN — Medication 3 MILLILITER(S): at 14:53

## 2023-04-28 RX ADMIN — HEPARIN SODIUM 5000 UNIT(S): 5000 INJECTION INTRAVENOUS; SUBCUTANEOUS at 06:22

## 2023-04-28 RX ADMIN — CALCITRIOL 0.25 MICROGRAM(S): 0.5 CAPSULE ORAL at 11:34

## 2023-04-28 RX ADMIN — ALBUTEROL 2 PUFF(S): 90 AEROSOL, METERED ORAL at 21:15

## 2023-04-28 RX ADMIN — TAMSULOSIN HYDROCHLORIDE 0.4 MILLIGRAM(S): 0.4 CAPSULE ORAL at 21:16

## 2023-04-28 RX ADMIN — ALBUTEROL 2 PUFF(S): 90 AEROSOL, METERED ORAL at 14:30

## 2023-04-28 NOTE — PROGRESS NOTE ADULT - PROBLEM SELECTOR PLAN 3
pt with NAGMA, similar to previous admission  HCO3 16 likely due to acute renal failure  Obtain ABG pt with NAGMA, similar to previous admission  HCO3 16 likely due to acute renal failure  started on sodium bicarbonate TID and calcitriol  HCO3 improved to 21

## 2023-04-28 NOTE — PROGRESS NOTE ADULT - ATTENDING COMMENTS
87M from home with PMH of pulmonary fibrosis recent ICU admission with pneumothorax and urinary retention then leaving AMA presents with urinary retention and abdominal discomfort found to have acute hyperkalemia and SANDI. S/p bajwa placement in ER with improvement in Cr and K    HyperK resolved  SANDI, obstructive, improving s/p bajwa  Uretention  Pulm fibrosis    -sandi improving with bajwa - consult urology  -appreciate nephro recs  -agree with fluids  -hyperK resolved  -c/w spiriva, symbicort, albuterol  -f/u pulm recs  -dvt ppx  -PT eval

## 2023-04-28 NOTE — CONSULT NOTE ADULT - TIME BILLING
- personally reviewed patient's labs, flowsheets, pertinent imaging, and consultant notes  - gen pul hx/exam  - medication reconciliation  - extensive counseling of pt and son bedside re: IPF, tx options, pros/cons of antifibrotic tx  - coordination of care w/ primary team
Patient/family/primary team

## 2023-04-28 NOTE — PROGRESS NOTE ADULT - ASSESSMENT
87 year old M from home with no known PMH presents with urinary retention. Patient reports since being discharged he has had very little urine output. Denies any blood in urine or pain. Denies fever, chills, cp, sob, palpitations, lightheadedness, dizziness, n/v/d, abdominal pain, constipation.  Nephrology consult called for SANDI    Assessment:  1) Non oliguric SANDI with probable underlying CKD likely pre-renal/dehydration/ ATN/Obstructive uropathy  2) Hyperkalemia now resolved  3) Acute urinary retention  4) History of hypoxic respiratory failure  5) BPH with LUTs  6) Anemia of chronic kidney disease  7) Renal osteodystrophy  8) NAGMA  9) IPF    Recommend:  Strict I/o  Avoid Nephrotoxic agents  S/p Lomax's catheter  Monitor BMP and electrolytes every 12 hrly  Continue Flomax and finasteride  CT abdomen and pelvis without contrast results reviewed  Urology/pulmonary consult reviewed  Monitor for post obstructive diuresis  IV/po hydration with watchful respiratory status  Anemia panel/CKD work up reviewed  Continue Sodium bicarbonate 650 mg po tid with meals with goal serum bicarbonate>22  Continue po calcitriol 0.25 mcg po TIW  PSA within normal limits  Volume status and electrolytes noted  No urgent need for RRT/HD  GOC/ Advance directives per patient/family  Will follow

## 2023-04-28 NOTE — PROGRESS NOTE ADULT - ASSESSMENT
87 year old M from home with PMH of pulmonary fibrosis, pertinent recent admission to [facility] ICU (4/23-4/26) for close monitoring. CT show pulmonary fibrosis with pneumomediastinum and small right apical PTX . He was initially started on BiPaP changed to NC. NAGMA due to SANDI. Pt left AMA. Pt presents with urinary retention and acute hyperkalemia admitted to telemetry for management of hyperkalemia and SANDI.

## 2023-04-28 NOTE — PROGRESS NOTE ADULT - PROBLEM SELECTOR PLAN 5
CT shows redemonstrated pneumomediastinum, continued findings of diffuse lung fibrosis with peripheral/basilar reticular opacities and honeycombing.   CT Surgery recommended no acute intervention last admission  Was noted to be on Azithro/Rocephin last admission for suspected PNA- pt has no clinical of signs of pneumonia, will hold off Abx for now  Resume Spiriva, Symbicort and Albuterol  saturating well on room air  Pulm, Dr. Duran consulted CT shows redemonstrated pneumomediastinum, continued findings of diffuse lung fibrosis with peripheral/basilar reticular opacities and honeycombing.   CT Surgery recommended no acute intervention last admission  Was noted to be on Azithro/Rocephin last admission for suspected PNA- pt has no clinical of signs of pneumonia, will hold off Abx for now  Resume Spiriva, Symbicort and Albuterol  saturating well on room air  Pulm, Dr. Chery consulted

## 2023-04-28 NOTE — PROGRESS NOTE ADULT - SUBJECTIVE AND OBJECTIVE BOX
PGY-1 Progress Note discussed with attending    PAGER #: [[phone]] TILL 5:00 PM  PLEASE CONTACT ON CALL TEAM:   - On Call Team (Please refer to Dustin) FROM 5:00 PM - 8:30PM  - Nightfloat Team FROM 8:30 -7:30 AM    INTERVAL HPI/OVERNIGHT EVENTS:   No acute overnight events. Pt     REVIEW OF SYSTEMS:  CONSTITUTIONAL: No fever, weight loss, or fatigue  RESPIRATORY: No cough, wheezing, chills or hemoptysis; No shortness of breath  CARDIOVASCULAR: No chest pain, palpitations, dizziness, or leg swelling  GASTROINTESTINAL: No abdominal pain. No nausea, vomiting, or hematemesis; No diarrhea or constipation. No melena or hematochezia.  GENITOURINARY: No dysuria or hematuria, urinary frequency  NEUROLOGICAL: No headaches, memory loss, loss of strength, numbness, or tremors  SKIN: No itching, burning, rashes, or lesions     MEDICATIONS  (STANDING):  budesonide  80 MICROgram(s)/formoterol 4.5 MICROgram(s) Inhaler 2 Puff(s) Inhalation two times a day  calcitriol   Capsule 0.25 MICROGram(s) Oral <User Schedule>  chlorhexidine 2% Cloths 1 Application(s) Topical <User Schedule>  dextrose 5% + sodium chloride 0.45%. 1000 milliLiter(s) (50 mL/Hr) IV Continuous <Continuous>  heparin   Injectable 5000 Unit(s) SubCutaneous every 12 hours  sodium bicarbonate 650 milliGRAM(s) Oral three times a day  tamsulosin 0.4 milliGRAM(s) Oral at bedtime  tiotropium 2.5 MICROgram(s) Inhaler 2 Puff(s) Inhalation daily    MEDICATIONS  (PRN):  acetaminophen     Tablet .. 650 milliGRAM(s) Oral every 6 hours PRN Temp greater or equal to 38C (100.4F), Mild Pain (1 - 3)  albuterol    90 MICROgram(s) HFA Inhaler 2 Puff(s) Inhalation every 6 hours PRN Shortness of Breath and/or Wheezing      Vital Signs Last 24 Hrs  T(C): 37 (28 Apr 2023 07:31), Max: 37 (28 Apr 2023 07:31)  T(F): 98.6 (28 Apr 2023 07:31), Max: 98.6 (28 Apr 2023 07:31)  HR: 91 (28 Apr 2023 07:31) (76 - 101)  BP: 115/65 (28 Apr 2023 07:31) (88/63 - 118/62)  BP(mean): --  RR: 18 (28 Apr 2023 07:31) (18 - 20)  SpO2: 99% (28 Apr 2023 07:31) (99% - 100%)    Parameters below as of 28 Apr 2023 07:31  Patient On (Oxygen Delivery Method): room air        PHYSICAL EXAMINATION:  GENERAL: NAD, well built  HEAD:  Atraumatic, Normocephalic  EYES:  conjunctiva and sclera clear  NECK: Supple, No JVD, Normal thyroid  CHEST/LUNG: Clear to auscultation. Clear to percussion bilaterally; No rales, rhonchi, wheezing, or rubs  HEART: Regular rate and rhythm; No murmurs, rubs, or gallops  ABDOMEN: Soft, Nontender, Nondistended; Bowel sounds present  NERVOUS SYSTEM:  Alert & Oriented X3,    EXTREMITIES:  2+ Peripheral Pulses, No clubbing, cyanosis, or edema  SKIN: warm dry                          8.5    7.04  )-----------( 200      ( 28 Apr 2023 06:30 )             24.8     04-28    135  |  107  |  70<H>  ----------------------------<  90  4.2   |  21<L>  |  4.36<H>    Ca    8.3<L>      28 Apr 2023 06:30  Phos  3.6     04-28  Mg     1.9     04-28    TPro  7.1  /  Alb  3.1<L>  /  TBili  0.5  /  DBili  x   /  AST  49<H>  /  ALT  27  /  AlkPhos  54  04-28    LIVER FUNCTIONS - ( 28 Apr 2023 06:30 )  Alb: 3.1 g/dL / Pro: 7.1 g/dL / ALK PHOS: 54 U/L / ALT: 27 U/L DA / AST: 49 U/L / GGT: x                     Culture - Urine (collected 27 Apr 2023 00:41)  Source: Clean Catch Clean Catch (Midstream)  Final Report (28 Apr 2023 07:27):    No growth        I&O's Summary    27 Apr 2023 07:01  - 28 Apr 2023 07:00  --------------------------------------------------------  IN: 0 mL / OUT: 900 mL / NET: -900 mL    28 Apr 2023 07:01  -  28 Apr 2023 10:51  --------------------------------------------------------  IN: 220 mL / OUT: 0 mL / NET: 220 mL        CAPILLARY BLOOD GLUCOSE        CAPILLARY BLOOD GLUCOSE          RADIOLOGY & ADDITIONAL TESTS:                   PGY-1 Progress Note discussed with attending    PAGER #: [[phone]] TILL 5:00 PM  PLEASE CONTACT ON CALL TEAM:   - On Call Team (Please refer to Dustin) FROM 5:00 PM - 8:30PM  - Nightfloat Team FROM 8:30 -7:30 AM    INTERVAL HPI/OVERNIGHT EVENTS:   No acute overnight events. Reviewed patient's lab work, medication and telemetry with him. He states he feels a little weak today but denies any other acute complaints.     REVIEW OF SYSTEMS:  CONSTITUTIONAL: No fever, weight loss,. + weakness  RESPIRATORY: No cough, wheezing, chills or hemoptysis; No shortness of breath  CARDIOVASCULAR: No chest pain, palpitations, dizziness, or leg swelling  GASTROINTESTINAL: No abdominal pain. No nausea, vomiting, or hematemesis; No diarrhea or constipation. No melena or hematochezia.  GENITOURINARY: No dysuria or hematuria, urinary frequency  NEUROLOGICAL: No headaches, memory loss, loss of strength, numbness, or tremors  SKIN: No itching, burning, rashes, or lesions     MEDICATIONS  (STANDING):  budesonide  80 MICROgram(s)/formoterol 4.5 MICROgram(s) Inhaler 2 Puff(s) Inhalation two times a day  calcitriol   Capsule 0.25 MICROGram(s) Oral <User Schedule>  chlorhexidine 2% Cloths 1 Application(s) Topical <User Schedule>  dextrose 5% + sodium chloride 0.45%. 1000 milliLiter(s) (50 mL/Hr) IV Continuous <Continuous>  heparin   Injectable 5000 Unit(s) SubCutaneous every 12 hours  sodium bicarbonate 650 milliGRAM(s) Oral three times a day  tamsulosin 0.4 milliGRAM(s) Oral at bedtime  tiotropium 2.5 MICROgram(s) Inhaler 2 Puff(s) Inhalation daily    MEDICATIONS  (PRN):  acetaminophen     Tablet .. 650 milliGRAM(s) Oral every 6 hours PRN Temp greater or equal to 38C (100.4F), Mild Pain (1 - 3)  albuterol    90 MICROgram(s) HFA Inhaler 2 Puff(s) Inhalation every 6 hours PRN Shortness of Breath and/or Wheezing      Vital Signs Last 24 Hrs  T(C): 37 (28 Apr 2023 07:31), Max: 37 (28 Apr 2023 07:31)  T(F): 98.6 (28 Apr 2023 07:31), Max: 98.6 (28 Apr 2023 07:31)  HR: 91 (28 Apr 2023 07:31) (76 - 101)  BP: 115/65 (28 Apr 2023 07:31) (88/63 - 118/62)  BP(mean): --  RR: 18 (28 Apr 2023 07:31) (18 - 20)  SpO2: 99% (28 Apr 2023 07:31) (99% - 100%)    Parameters below as of 28 Apr 2023 07:31  Patient On (Oxygen Delivery Method): room air        PHYSICAL EXAMINATION:  GENERAL: NAD, elderly frail male  HEAD:  Atraumatic, Normocephalic  EYES:  conjunctiva and sclera clear  NECK: Supple, No JVD, Normal thyroid  CHEST/LUNG: + mild bibasilar crackles. Clear to percussion bilaterally; No rales, rhonchi, wheezing, or rubs  HEART: Regular rate and rhythm; No murmurs, rubs, or gallops  ABDOMEN: Soft, Nontender, Nondistended; Bowel sounds present  NERVOUS SYSTEM:  Alert & Oriented X3  EXTREMITIES:  2+ Peripheral Pulses, No clubbing, cyanosis, or edema  SKIN: warm dry, + bajwa                          8.5    7.04  )-----------( 200      ( 28 Apr 2023 06:30 )             24.8     04-28    135  |  107  |  70<H>  ----------------------------<  90  4.2   |  21<L>  |  4.36<H>    Ca    8.3<L>      28 Apr 2023 06:30  Phos  3.6     04-28  Mg     1.9     04-28    TPro  7.1  /  Alb  3.1<L>  /  TBili  0.5  /  DBili  x   /  AST  49<H>  /  ALT  27  /  AlkPhos  54  04-28    LIVER FUNCTIONS - ( 28 Apr 2023 06:30 )  Alb: 3.1 g/dL / Pro: 7.1 g/dL / ALK PHOS: 54 U/L / ALT: 27 U/L DA / AST: 49 U/L / GGT: x                     Culture - Urine (collected 27 Apr 2023 00:41)  Source: Clean Catch Clean Catch (Midstream)  Final Report (28 Apr 2023 07:27):    No growth        I&O's Summary    27 Apr 2023 07:01  -  28 Apr 2023 07:00  --------------------------------------------------------  IN: 0 mL / OUT: 900 mL / NET: -900 mL    28 Apr 2023 07:01  -  28 Apr 2023 10:51  --------------------------------------------------------  IN: 220 mL / OUT: 0 mL / NET: 220 mL

## 2023-04-28 NOTE — PROGRESS NOTE ADULT - SUBJECTIVE AND OBJECTIVE BOX
Rafael Bradshaw MD (Nephrology progress note)  [address] [facility],  [address],  [facility]  TEl: [phone]  Cell: [phone]    Patient is a 87y Male seen and evaluated at bedside. Vital signs, laboratory data, imaging studies, consult notes reviewed done within past 24 hours. Overnight events noted.    Allergies    No Known Allergies    Intolerances        ROS:  CONSTITUTIONAL: No fever or chills.  HEENT: No headcahe or visual disturbances.  RESPIRATORY: No shortness of breath, cough, hemoptysis or wheezing  CARDIOVASCULAR: No Chest pain, shortness of breath, palpitations, dizziness, syncope, orthopnea, PND or leg swelling.  GASTROINTESTINAL: No abdominal pain, nausea, vomiting, diarrhea, hematemesis or blood per rectum.  GENITOURINARY: No urinary frequency, urgency, gross hematuria, dysuria, flank or supra pubic pain, difficulty passing urine.  NEUROLOGICAL: No headache, focal limb weakness, tingling or numbness or seizure like activity  SKIN: No skin rash or lesion  MUSCULOSKELETAL: No leg pain, calf pain or swelling    VITALS:    T(C): 37 (23 @ 07:31), Max: 37 (23 @ 07:31)  HR: 91 (23 07:31) (76 - 101)  BP: 115/65 (23 @ 07:31) (88/63 - 118/62)  RR: 18 (23 @ 07:31) (18 - 20)  SpO2: 99% (23 07:31) (99% - 100%)  CAPILLARY BLOOD GLUCOSE          23 @ 07:01  -  23 @ 07:00  --------------------------------------------------------  IN: 0 mL / OUT: 900 mL / NET: -900 mL    23 @ 07:01  -  23 @ 11:01  --------------------------------------------------------  IN: 220 mL / OUT: 0 mL / NET: 220 mL      MEDICATIONS  (STANDING):  budesonide  80 MICROgram(s)/formoterol 4.5 MICROgram(s) Inhaler 2 Puff(s) Inhalation two times a day  calcitriol   Capsule 0.25 MICROGram(s) Oral <User Schedule>  chlorhexidine 2% Cloths 1 Application(s) Topical <User Schedule>  dextrose 5% + sodium chloride 0.45%. 1000 milliLiter(s) (50 mL/Hr) IV Continuous <Continuous>  heparin   Injectable 5000 Unit(s) SubCutaneous every 12 hours  sodium bicarbonate 650 milliGRAM(s) Oral three times a day  tamsulosin 0.4 milliGRAM(s) Oral at bedtime  tiotropium 2.5 MICROgram(s) Inhaler 2 Puff(s) Inhalation daily    MEDICATIONS  (PRN):  acetaminophen     Tablet .. 650 milliGRAM(s) Oral every 6 hours PRN Temp greater or equal to 38C (100.4F), Mild Pain (1 - 3)  albuterol    90 MICROgram(s) HFA Inhaler 2 Puff(s) Inhalation every 6 hours PRN Shortness of Breath and/or Wheezing      PHYSICAL EXAM:  GENERAL: Alert, awake and oriented x3 in no distress  HEENT: MAGALYS, EOMI, neck supple, no JVP, no carotid bruit, oral mucosa moist and pink.  CHEST/LUNG: Bilateral clear breath sounds, no rales, no crepitations, no rhonchi, no wheezing  HEART: Regular rate and rhythm, CHANTAL II/VI at LPSB, no gallops, no rub   ABDOMEN: Soft, nontender, non distended, bowel sounds present, no palpable organomegaly  : No flank or supra pubic tenderness.  EXTREMITIES: Peripheral pulses are palpable, no pedal edema  Neurology: AAOx3, no focal neurological deficit  SKIN: No rash or skin lesion  Musculoskeletal: No joint deformity or spinal tenderness.      Vascular ACCESS:     LABS:                        8.5    7.04  )-----------( 200      ( 2023 06:30 )             24.8     -    135  |  107  |  70<H>  ----------------------------<  90  4.2   |  21<L>  |  4.36<H>    Ca    8.3<L>      2023 06:30  Phos  3.6     -  Mg     1.9     -    TPro  7.1  /  Alb  3.1<L>  /  TBili  0.5  /  DBili  x   /  AST  49<H>  /  ALT  27  /  AlkPhos  54          Urinalysis Basic - ( 2023 00:42 )    Color: Yellow / Appearance: Clear / S.010 / pH: x  Gluc: x / Ketone: Negative  / Bili: Negative / Urobili: Negative   Blood: x / Protein: 30 mg/dL / Nitrite: Negative   Leuk Esterase: Negative / RBC: 2-5 /HPF / WBC 0-2 /HPF   Sq Epi: x / Non Sq Epi: x / Bacteria: Trace /HPF            RADIOLOGY & ADDITIONAL STUDIES:    Imaging Personally Reviewed:  [x] YES  [ ] NO    Consultant(s) Notes Reviewed:  [x] YES  [ ] NO    Care Discussed with Primary team/ Other Providers [x] YES  [ ] NO       Rafael Bradshaw MD (Nephrology progress note)  [address], [facility],  [address],  [facility]  TEl: [phone]  Cell: [phone]    Patient is a 87y Male seen and evaluated at bedside. Vital signs, laboratory data, imaging studies, consult notes reviewed done within past 24 hours. Overnight events noted. Patient lying in bed alert and awake in no distress feels fine. Interval slowly improving renal function with S cr 4.3 with non oliguria. Urology/Pulmonary consult reviewed.    Allergies    No Known Allergies    Intolerances        ROS:  CONSTITUTIONAL: No fever or chills.  HEENT: No headache or visual disturbances.  RESPIRATORY: Mild SOB but denies cough hemoptysis or wheezing  CARDIOVASCULAR: SOB but denies chest pain, palpitations, dizziness, syncope, orthopnea, PND or leg swelling.  GASTROINTESTINAL: No abdominal pain, nausea, vomiting, diarrhea, hematemesis or blood per rectum.  GENITOURINARY: No flank or supra pubic pain, Lomax's catheter present  NEUROLOGICAL: No headache, focal limb weakness, tingling or numbness or seizure like activity  SKIN: No skin rash or lesion  MUSCULOSKELETAL: No leg pain, calf pain or swelling    VITALS:    T(C): 37 (23 @ 07:31), Max: 37 (23 @ 07:31)  HR: 91 (23 07:31) (76 - 101)  BP: 115/65 (23 @ 07:31) (88/63 - 118/62)  RR: 18 (23 07:31) (18 - 20)  SpO2: 99% (23 07:31) (99% - 100%)  CAPILLARY BLOOD GLUCOSE          23 @ 07:01  -  23 @ 07:00  --------------------------------------------------------  IN: 0 mL / OUT: 900 mL / NET: -900 mL    23 @ 07:01  -  23 @ 11:01  --------------------------------------------------------  IN: 220 mL / OUT: 0 mL / NET: 220 mL      MEDICATIONS  (STANDING):  budesonide  80 MICROgram(s)/formoterol 4.5 MICROgram(s) Inhaler 2 Puff(s) Inhalation two times a day  calcitriol   Capsule 0.25 MICROGram(s) Oral <User Schedule>  chlorhexidine 2% Cloths 1 Application(s) Topical <User Schedule>  dextrose 5% + sodium chloride 0.45%. 1000 milliLiter(s) (50 mL/Hr) IV Continuous <Continuous>  heparin   Injectable 5000 Unit(s) SubCutaneous every 12 hours  sodium bicarbonate 650 milliGRAM(s) Oral three times a day  tamsulosin 0.4 milliGRAM(s) Oral at bedtime  tiotropium 2.5 MICROgram(s) Inhaler 2 Puff(s) Inhalation daily    MEDICATIONS  (PRN):  acetaminophen     Tablet .. 650 milliGRAM(s) Oral every 6 hours PRN Temp greater or equal to 38C (100.4F), Mild Pain (1 - 3)  albuterol    90 MICROgram(s) HFA Inhaler 2 Puff(s) Inhalation every 6 hours PRN Shortness of Breath and/or Wheezing      PHYSICAL EXAM:  GENERAL: Alert, awake and oriented x3   HEENT: MAGALYS, EOMI, neck supple, no JVP  CHEST/LUNG: Bilateral decreased breath sounds, bibasilar rhonchi and crepitations, no wheezing  HEART: Regular rate and rhythm, CHANTAL II/VI at LPSB, no gallops, no rub   ABDOMEN: Soft, nontender, non distended, bowel sounds present, no palpable organomegaly  : No flank or supra pubic tenderness.  EXTREMITIES: Peripheral pulses are palpable, no pedal edema  Neurology: AAOx3, no focal neurological deficit  SKIN: No rash or skin lesion  Musculoskeletal: No joint deformity or spinal tenderness.      Vascular ACCESS: None    LABS:                        8.5    7.04  )-----------( 200      ( 2023 06:30 )             24.8     04-28    135  |  107  |  70<H>  ----------------------------<  90  4.2   |  21<L>  |  4.36<H>    Ca    8.3<L>      2023 06:30  Phos  3.6       Mg     1.9         TPro  7.1  /  Alb  3.1<L>  /  TBili  0.5  /  DBili  x   /  AST  49<H>  /  ALT  27  /  AlkPhos  54          Urinalysis Basic - ( 2023 00:42 )    Color: Yellow / Appearance: Clear / S.010 / pH: x  Gluc: x / Ketone: Negative  / Bili: Negative / Urobili: Negative   Blood: x / Protein: 30 mg/dL / Nitrite: Negative   Leuk Esterase: Negative / RBC: 2-5 /HPF / WBC 0-2 /HPF   Sq Epi: x / Non Sq Epi: x / Bacteria: Trace /HPF            RADIOLOGY & ADDITIONAL STUDIES:  rad< from: CT Abdomen and Pelvis No Cont (23 @ 22:33) >    ACC: [id number] EXAM:  CT ABDOMEN AND PELVIS   ORDERED BY: MICHAEL JEFFERSON     PROCEDURE DATE:  2023          INTERPRETATION:  CLINICAL INFORMATION: Abdominal pain and diarrhea.    COMPARISON: CT torso 2023.    PROCEDURE:  CT of the Abdomenand Pelvis was performed without intravenous contrast.  Intravenous contrast: None.  Oral contrast: None.  Sagittal and coronal reformats were performed.    FINDINGS: Absence of intravenous contrast limits evaluation of   vasculature and solid organs.    LOWER CHEST: Redemonstrated pneumomediastinum extending to retrocrural   spaces. Continued findings of diffuse lung fibrosis with   peripheral/basilar reticular opacities and honeycombing.    LIVER: Within normal limits.  BILE DUCTS: Normal caliber.  GALLBLADDER: Within normal limits.  SPLEEN: Within normal limits.  PANCREAS: Within normal limits.  ADRENALS: Within normal limits.  KIDNEYS/URETERS: Mild fullness of bilateral renal pelvis without   calcified stone identified along the course the ureter.    BLADDER: Wall thickening, difficult to assess secondary compression   containing Lomax catheter.  REPRODUCTIVE ORGANS: Prominent prostate.    BOWEL: No acute bowel inflammatory changes or obstruction. Nonvisualized   appendix. Diverticulosis coli. Inadequately distended stomach, limiting   detailed evaluation.  PERITONEUM: No ascites.  VESSELS:  Atherosclerotic changes.  RETROPERITONEUM: No lymphadenopathy.  ABDOMINAL WALL: Small bilateral groin hernias containing fluid.  BONES: Degenerative changes.    IMPRESSION:    No acute bowel inflammatory change or obstruction.    Mild fullness of bilateral renal pelvis without calcified stone   identified along the course the ureter.    Bladder wall thickening, difficult to assess secondary compression   containing Lomax catheter. Correlate with urinalysis and lab values to   assess for cystitis and/or ascending urinary tract infection.    Redemonstrated pneumomediastinum.    Continued findings of diffuse lung fibrosis with peripheral/basilar   reticular opacities and honeycombing.    --- End of Report ---            ADDY CALERO MD; Attending Radiologist  This document has been electronically signed. 2023 11:51PM    < end of copied text >    Imaging Personally Reviewed:  [x] YES  [ ] NO    Consultant(s) Notes Reviewed:  [x] YES  [ ] NO    Care Discussed with Primary team/ Other Providers [x] YES  [ ] NO

## 2023-04-28 NOTE — PROGRESS NOTE ADULT - PROBLEM SELECTOR PLAN 4
pt presents with elevated K, urinary retention after failing trial of void  improved after 500 cc NS, D50, insulin 2.5 U, lokelma 10 mg given by ED  EKG shows peaked t waves    - monitor K, avoid nephrotoxic agents  - f/u repeat EKG  - telemetry monitoring  - Resolved* pt presents with elevated K, urinary retention after failing trial of void  improved after 500 cc NS, D50, insulin 2.5 U, lokelma 10 mg given by ED  EKG shows peaked t waves    - monitor K, avoid nephrotoxic agents  - telemetry monitoring  - Resolved*

## 2023-04-28 NOTE — CONSULT NOTE ADULT - SUBJECTIVE AND OBJECTIVE BOX
PULMONARY SERVICE INITIAL CONSULT NOTE    HPI:  87 year old M from home with no known PMH presents with urinary retention. Patient reports since being discharged he has had very little urine output. Denies any blood in urine or pain. Denies fever, chills, cp, sob, palpitations, lightheadedness, dizziness, n/v/d, abdominal pain, constipation. Reports significant weight loss and poor oral intake as he does not have teeth and his appetite is deteriorating, reports one episode of loose stool on .    Of note, pt was admitted to [facility] ICU  and left AMA  prior to completion of trial of void. Was admitted for acute hypoxic respiratory failure 2/2 pulmonary fibrosis, CT shows diffuse lung fibrosis, pneumomediastinum, mild fullness of bilateral renal pelvis without stones, bladder wall thickening    REVIEW OF SYSTEMS:  Constitutional: No fever, weight loss or fatigue  Eyes: No eye pain, visual disturbances, or discharge  ENMT:  No difficulty hearing, tinnitus, vertigo; No sinus or throat pain  Neck: No pain, stiffness or neck swelling  Respiratory: see HPI  Cardiovascular: No chest pain, palpitations, dizziness or leg swelling  Gastrointestinal: No abdominal or epigastric pain. No nausea, vomiting or hematemesis; No diarrhea or constipation. No melena or hematochezia.  Genitourinary: No dysuria, frequency, hematuria or incontinence  Neurological: No headaches, memory loss, loss of strength, numbness or tremors  Skin: No itching, burning, rashes or lesions   Lymph Nodes: No enlarged glands  Endocrine: No heat or cold intolerance; No hair loss  Musculoskeletal: No joint pain or swelling; No muscle, back or extremity pain  Psychiatric: No depression, anxiety, mood swings or difficulty sleeping  Heme/Lymph: No easy bruising or bleeding gums  Allergy and Immunologic: No hives or eczema    PAST MEDICAL & SURGICAL HISTORY:  No pertinent past medical history      No significant past surgical history    FAMILY HISTORY:  Denies known FHx of lung dz in relatives; son denies ILD in himself     SOCIAL HISTORY:  Smoking Status: [ ] Current, [X] Former, [ ] Never  Pack Years: 0.5-1ppd, quit in 1970s    MEDICATIONS:  Pulmonary:  albuterol    90 MICROgram(s) HFA Inhaler 2 Puff(s) Inhalation every 6 hours PRN  albuterol/ipratropium for Nebulization. 3 milliLiter(s) Nebulizer once  budesonide  80 MICROgram(s)/formoterol 4.5 MICROgram(s) Inhaler 2 Puff(s) Inhalation two times a day  tiotropium 2.5 MICROgram(s) Inhaler 2 Puff(s) Inhalation daily    Antimicrobials:    Anticoagulants:  heparin   Injectable 5000 Unit(s) SubCutaneous every 12 hours    Onc:    GI/:  tamsulosin 0.4 milliGRAM(s) Oral at bedtime    Endocrine:    Cardiac:    Other Medications:  acetaminophen     Tablet .. 650 milliGRAM(s) Oral every 6 hours PRN  calcitriol   Capsule 0.25 MICROGram(s) Oral <User Schedule>  chlorhexidine 2% Cloths 1 Application(s) Topical <User Schedule>  dextrose 5% + sodium chloride 0.45%. 1000 milliLiter(s) IV Continuous <Continuous>  sodium bicarbonate 650 milliGRAM(s) Oral three times a day    Allergies    No Known Allergies    Intolerances    Vital Signs Last 24 Hrs  T(C): 36.9 (2023 11:03), Max: 37 (2023 07:31)  T(F): 98.4 (2023 11:03), Max: 98.6 (2023 07:31)  HR: 92 (2023 11:03) (76 - 92)  BP: 100/60 (2023 11:03) (100/60 - 118/62)  BP(mean): --  RR: 18 (2023 11:03) (18 - 19)  SpO2: 98% (2023 11:03) (98% - 100%)    Parameters below as of 2023 11:03  Patient On (Oxygen Delivery Method): room air     @ : @ 07:00  --------------------------------------------------------  IN: 0 mL / OUT: 900 mL / NET: -900 mL     @ 07: @ 14:42  --------------------------------------------------------  IN: 220 mL / OUT: 300 mL / NET: -80 mL    PHYSICAL EXAM:  Constitutional: frail/cachectic appearing man sitting up in chair; NAD  Head: NC/AT  EENT: PERRL, anicteric sclera; oropharynx clear, MMM  Neck: supple, no appreciable JVD  Respiratory: few velcro-like crackles in mid posterior fields, globally diminished BS otherwise with good air entry; respirations overall non-labored on ambient air  Cardiovascular: +S1/S2, RRR  Gastrointestinal: soft, NT/ND  Extremities: WWP; no edema, clubbing or cyanosis  Vascular: 2+ radial pulses B/L  Neurological: awake and alert; ROMAN    LABS:  CBC Full  -  ( 2023 06:30 )  WBC Count : 7.04 K/uL  RBC Count : 2.71 M/uL  Hemoglobin : 8.5 g/dL  Hematocrit : 24.8 %  Platelet Count - Automated : 200 K/uL  Mean Cell Volume : 91.5 fl  Mean Cell Hemoglobin : 31.4 pg  Mean Cell Hemoglobin Concentration : 34.3 gm/dL  Auto Neutrophil # : x  Auto Lymphocyte # : x  Auto Monocyte # : x  Auto Eosinophil # : x  Auto Basophil # : x  Auto Neutrophil % : x  Auto Lymphocyte % : x  Auto Monocyte % : x  Auto Eosinophil % : x  Auto Basophil % : x        135  |  107  |  70<H>  ----------------------------<  90  4.2   |  21<L>  |  4.36<H>    Ca    8.3<L>      2023 06:30  Phos  3.6       Mg     1.9         TPro  7.1  /  Alb  3.1<L>  /  TBili  0.5  /  DBili  x   /  AST  49<H>  /  ALT  27  /  AlkPhos  54        Urinalysis Basic - ( 2023 00:42 )    Color: Yellow / Appearance: Clear / S.010 / pH: x  Gluc: x / Ketone: Negative  / Bili: Negative / Urobili: Negative   Blood: x / Protein: 30 mg/dL / Nitrite: Negative   Leuk Esterase: Negative / RBC: 2-5 /HPF / WBC 0-2 /HPF   Sq Epi: x / Non Sq Epi: x / Bacteria: Trace /HPF    RADIOLOGY & ADDITIONAL STUDIES:  < from: CT Chest No Cont (23 @ 14:35) >    ACC: [id number] EXAM:  CT CHEST   ORDERED BY: TYSON GTZ     PROCEDURE DATE:  2023      INTERPRETATION:  CLINICAL INFORMATION: Hypoxia. Respiratory failure.   Acuterenal failure.    COMPARISON: None.    CONTRAST/COMPLICATIONS:  IV Contrast: NONE  Oral Contrast: NONE  Complications: None reported at time of study completion    PROCEDURE:  CT of the Chest was performed.  Sagittal and coronal reformats were performed.    FINDINGS:  CHEST:  LUNGS AND LARGE AIRWAYS: Diffuse fibrotic lung changes with bilateral   peripheral and basilar reticular opacities and honeycomb cysts.  PLEURA: Trace right pneumothorax.  MEDIASTINUM AND SHERITA: Pneumomediastinum. No lymphadenopathy.  CHEST WALL AND LOWER NECK: Within normal limits.    IMPRESSION:  Trace right pneumothorax and pneumomediastinum.    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    < from: Xray Chest 1 View-PORTABLE IMMEDIATE (23 @ 11:06) >  IMPRESSION: Normal heart and mediastinum.    Diffuse reticulonodular interstitial changes are apparent. Pleural   parenchymal fibronodular residuals are seen at the apices bilaterally.   The angles are sharp. No acute bony abnormality is seen.

## 2023-04-28 NOTE — PROGRESS NOTE ADULT - PROBLEM SELECTOR PLAN 6
CT Abdomen & Pelvis shows redemonstrated pneumomediastinum.  Patient saturating well on room air  No acute interventions as above

## 2023-04-28 NOTE — CONSULT NOTE ADULT - ASSESSMENT
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 Left AMA from ICU then returned to hospital 4/27 with urinary retention and ongoing renal failure for which he was re-admitted. Asked for mgmt recs in ILD/IPF.    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 He has thus far been asymptomatic of ILD save for some VALENCIA, but not requiring O2 and not dyspneic at rest - has been independent of ADLs and still practicing as physician up until recently. Given the advanced disease, he is less likely to benefit from antifibrotic therapy at this point compared w/ the risk of side effects with medications e.g. pirfenidone or nintedanib PTX/pneumomediastinum likely spontaneous in setting of chronic parenchymal disease, resolving and asymptomatic of it.     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 duoneb q6h - would rx albuterol MDI for discharge (with spacer) which would likely have the greatest benefit vs. risk for symptomatic mgmt of his background IPF  - may also benefit from inhaled LAMA tx e.g. Spiriva (son says pt has already at home)  - technically indicated for antifibrotic tx with Esbriet (pirfenidone) or nintedanib but these drugs are best used to slow or forestall disease progression when diagnosed early  - long discussion had with pt and son -  given pts advanced radiographic disease and the side effect profile of these drugs, they would like to hold off starting these medications at this time   - continue mobilizing OOBTC daily and encouraging ambulation with PT to avoid atelectasis and deconditioning  - no acute intervention needed for pneumomediastinum and appr thoracic eval earlier in the week; PTX appears to be resolving as anticipated  - will follow with you    Cesar Chery, DO  Pulmonary & Critical Care Medicine  Available on Teams
JOSH BOWDEN is a 87y male with pulmonary fibrosis who presented with urinary retention, bajwa placed in ED, ?SANDI (baseline unknown), prominent prostate on CT, no hydronephrosis. b/l renal pelvis dilation.   Cr downtrending  PSA wnl   Recorded UO 600cc/24hr   UA negative for infection    PLAN   - Recommend starting finasteride 5mg PO daily   - Continue flomax  - Continue bajwa catheter   - Recommend TOV in 3-7 days as flomax and proscar take several days to take effect   - If planning to d/c with bajwa to leg bag, please have patient follow up next week with Dr. Hicks  - Remainder of care per primary team  - discussed with attending on call Dr. Hicks
87 year old M from home with no known PMH presents with urinary retention. Patient reports since being discharged he has had very little urine output. Denies any blood in urine or pain. Denies fever, chills, cp, sob, palpitations, lightheadedness, dizziness, n/v/d, abdominal pain, constipation.  Nephrology consult called for SANDI    Assessment:  1) Non oliguric SANDI with probable underlying CKD likely pre-renal/dehydration/ ATN/Obstructive uropathy  2) Hyperkalemia  3) Acute urinary retention  4) History of hypoxic respiratory failure  5) BPH with LUTs  6) Anemia of chronic kidney disease  7) Renal osteodystrophy  8) NAGMA    Recommend:  Strict I/o  Avoid Nephrotoxic agents  S/p Lomax's catheter  Monitor BMP and electrolytes every 12 hrly  Received Lokelma earlier  Continue Flomax  CT abdomen and pelvis without contrast results reviewed  Consider urology evaluation  Monitor for post obstructive diuresis  Gentle IV hydration with D5 1/2 NS @ 50-60cc/hr   Anemia panel/CKD work up reviewed  Add Sodium bicarbonate 650 mg po tid with meals with goal serum bicarbonate>22  Add po calcitriol 0.25 mcg po TIW  PSA within normal limits  Volume status and electrolytes noted  No urgent need for RRT/HD  GOC/ Advance directives per patient/family  Will follow

## 2023-04-28 NOTE — PROGRESS NOTE ADULT - PROBLEM SELECTOR PLAN 1
patient presents with elevated Cr to 5.3, unclear baseline however recently elevated up to 6.8 and improving since prior admission  associated with urinary retention  bajwa placed by urology overnight per patient request in ED  CT shows renal pelvis fullness of bilateral renal pelvis without calcified stone identified along the course the ureter. Bladder wall thickening. Prostate Enlargement.    - maintain bajwa  - monitor Cr  - avoid nephrotoxic agents  - Cr improving  - NephroDr. Bradshaw following patient presents with elevated Cr to 5.3, unclear baseline however recently elevated up to 6.8 and improving since prior admission  associated with urinary retention  bajwa placed by urology overnight per patient request in ED  CT shows renal pelvis fullness of bilateral renal pelvis without calcified stone identified along the course the ureter. Bladder wall thickening. Prostate Enlargement.    - maintain bajwa  - monitor Cr  - avoid nephrotoxic agents  - Cr improving daily  - Nephro, Dr. Bradshaw following

## 2023-04-28 NOTE — PROGRESS NOTE ADULT - PROBLEM SELECTOR PLAN 2
likely 2/2 to prostate enlargement  c/w Flomax  plan as above likely 2/2 to prostate enlargement  c/w Flomax  plan as above  f/u with nephrology reccs

## 2023-04-28 NOTE — CONSULT NOTE ADULT - SUBJECTIVE AND OBJECTIVE BOX
HPI: 87 year old M from home PMH of pulmonary fibrosis who presented to the ED with urinary retention. Patient was recently admitted to [facility] ICU on  for acute hypoxic respiratory failure. During that admission, Scr 6 from unknown baseline, bajwa catheter was placed. It was removed on  however patient left the hospital AMA prior to completing TOV. Patient reported to ED on  with abdominal discomfort and little urine output, bladder scan revealed urinary retention and a bajwa catheter was placed in ED; patient was admitted to medicine. CTAP mild fullness of bilateral renal pelvis without calcified stone identified along the course of the ureter. and bladder wall thickening, no hydronephrosis. Urology was consulted for urinary retention and enlarged prostate on CTAP. PSA 1.76 on . Cr 4.36. Admits that he experienced dysuria prior to bajwa catheter placement in ED. Patient denies any history of CKD, dialysis, BPH, denies fevers, chills, nausea, vomiting.    PAST MEDICAL & SURGICAL HISTORY:  No pertinent past medical history      No significant past surgical history          MEDICATIONS  (STANDING):  budesonide  80 MICROgram(s)/formoterol 4.5 MICROgram(s) Inhaler 2 Puff(s) Inhalation two times a day  calcitriol   Capsule 0.25 MICROGram(s) Oral <User Schedule>  chlorhexidine 2% Cloths 1 Application(s) Topical <User Schedule>  dextrose 5% + sodium chloride 0.45%. 1000 milliLiter(s) (50 mL/Hr) IV Continuous <Continuous>  heparin   Injectable 5000 Unit(s) SubCutaneous every 12 hours  sodium bicarbonate 650 milliGRAM(s) Oral three times a day  tamsulosin 0.4 milliGRAM(s) Oral at bedtime  tiotropium 2.5 MICROgram(s) Inhaler 2 Puff(s) Inhalation daily    MEDICATIONS  (PRN):  acetaminophen     Tablet .. 650 milliGRAM(s) Oral every 6 hours PRN Temp greater or equal to 38C (100.4F), Mild Pain (1 - 3)  albuterol    90 MICROgram(s) HFA Inhaler 2 Puff(s) Inhalation every 6 hours PRN Shortness of Breath and/or Wheezing      Allergies    No Known Allergies    Intolerances        ROS: Negative except per HPI.     SOCIAL HISTORY:  Smoking history   ETOH history    OBJECTIVE:    Vital Signs Last 24 Hrs  T(C): 36.9 (2023 11:03), Max: 37 (2023 07:31)  T(F): 98.4 (2023 11:), Max: 98.6 (2023 07:31)  HR: 92 (:) (76 - 92)  BP: 100/60 (2023 11:) (100/60 - 118/62)  BP(mean): --  RR: 18 (:) (18 - 19)  SpO2: 98% (:) (98% - 100%)    Parameters below as of 2023 11:03  Patient On (Oxygen Delivery Method): room air        I&O's Summary    2023 07:01  -  2023 07:00  --------------------------------------------------------  IN: 0 mL / OUT: 900 mL / NET: -900 mL    2023 07:01  -  2023 12:16  --------------------------------------------------------  IN: 220 mL / OUT: 0 mL / NET: 220 mL        LABS:                        8.5    7.04  )-----------( 200      ( 2023 06:30 )             24.8     28    135  |  107  |  70<H>  ----------------------------<  90  4.2   |  21<L>  |  4.36<H>    Ca    8.3<L>      2023 06:30  Phos  3.6       Mg     1.9         TPro  7.1  /  Alb  3.1<L>  /  TBili  0.5  /  DBili  x   /  AST  49<H>  /  ALT  27  /  AlkPhos  54        Urinalysis Basic - ( 2023 00:42 )    Color: Yellow / Appearance: Clear / S.010 / pH: x  Gluc: x / Ketone: Negative  / Bili: Negative / Urobili: Negative   Blood: x / Protein: 30 mg/dL / Nitrite: Negative   Leuk Esterase: Negative / RBC: 2-5 /HPF / WBC 0-2 /HPF   Sq Epi: x / Non Sq Epi: x / Bacteria: Trace /HPF      CAPILLARY BLOOD GLUCOSE        LIVER FUNCTIONS - ( 2023 06:30 )  Alb: 3.1 g/dL / Pro: 7.1 g/dL / ALK PHOS: 54 U/L / ALT: 27 U/L DA / AST: 49 U/L / GGT: x             Cultures:  Culture Results:   No growth ( @ 00:41)      PHYSICAL EXAM:   General: AOx3, NAD.   HEENT: NC/AT. hard of hearing.  Cardio: RR  Pulm: Normal chest rise and expansion. Non-labored breathing.  GI: Abdomen soft, nontender, nondistended.  : Bajwa catheter in place draining clear yellow urine. No suprapubic tenderness.   Back: No CVAT b/l    RADIOLOGY & ADDITIONAL STUDIES:  < from: CT Abdomen and Pelvis No Cont (23 @ 22:33) >  ACC: [id number] EXAM:  CT ABDOMEN AND PELVIS   ORDERED BY: MICHAEL JEFFERSON     PROCEDURE DATE:  2023          INTERPRETATION:  CLINICAL INFORMATION: Abdominal pain and diarrhea.    COMPARISON: CT torso 2023.    PROCEDURE:  CT of the Abdomenand Pelvis was performed without intravenous contrast.  Intravenous contrast: None.  Oral contrast: None.  Sagittal and coronal reformats were performed.    FINDINGS: Absence of intravenous contrast limits evaluation of   vasculature and solid organs.    LOWER CHEST: Redemonstrated pneumomediastinum extending to retrocrural   spaces. Continued findings of diffuse lung fibrosis with   peripheral/basilar reticular opacities and honeycombing.    LIVER: Within normal limits.  BILE DUCTS: Normal caliber.  GALLBLADDER: Within normal limits.  SPLEEN: Within normal limits.  PANCREAS: Within normal limits.  ADRENALS: Within normal limits.  KIDNEYS/URETERS: Mild fullness of bilateral renal pelvis without   calcified stone identified along the course the ureter.    BLADDER: Wall thickening, difficult to assess secondary compression   containing Bajwa catheter.  REPRODUCTIVE ORGANS: Prominent prostate.    BOWEL: No acute bowel inflammatory changes or obstruction. Nonvisualized   appendix. Diverticulosis coli. Inadequately distended stomach, limiting   detailed evaluation.  PERITONEUM: No ascites.  VESSELS:  Atherosclerotic changes.  RETROPERITONEUM: No lymphadenopathy.  ABDOMINAL WALL: Small bilateral groin hernias containing fluid.  BONES: Degenerative changes.    IMPRESSION:    No acute bowel inflammatory change or obstruction.    Mild fullness of bilateral renal pelvis without calcified stone   identified along the course the ureter.    Bladder wall thickening, difficult to assess secondary compression   containing Bajwa catheter. Correlate with urinalysis and lab values to   assess for cystitis and/or ascending urinary tract infection.    Redemonstrated pneumomediastinum.    Continued findings of diffuse lung fibrosis with peripheral/basilar   reticular opacities and honeycombing.    --- End of Report ---    < end of copied text >    ASSESSMENT/PLAN:     PLAN     This note and it's recommendations are preliminary until co-signed by attending physician

## 2023-04-29 LAB
ALBUMIN SERPL ELPH-MCNC: 2.7 G/DL — LOW (ref 3.5–5)
ALP SERPL-CCNC: 51 U/L — SIGNIFICANT CHANGE UP (ref 40–120)
ALT FLD-CCNC: 23 U/L DA — SIGNIFICANT CHANGE UP (ref 10–60)
ANION GAP SERPL CALC-SCNC: 11 MMOL/L — SIGNIFICANT CHANGE UP (ref 5–17)
AST SERPL-CCNC: 29 U/L — SIGNIFICANT CHANGE UP (ref 10–40)
BILIRUB SERPL-MCNC: 0.4 MG/DL — SIGNIFICANT CHANGE UP (ref 0.2–1.2)
BUN SERPL-MCNC: 69 MG/DL — HIGH (ref 7–18)
CALCIUM SERPL-MCNC: 8.2 MG/DL — LOW (ref 8.4–10.5)
CHLORIDE SERPL-SCNC: 106 MMOL/L — SIGNIFICANT CHANGE UP (ref 96–108)
CO2 SERPL-SCNC: 16 MMOL/L — LOW (ref 22–31)
CREAT SERPL-MCNC: 4.03 MG/DL — HIGH (ref 0.5–1.3)
EGFR: 14 ML/MIN/1.73M2 — LOW
GLUCOSE SERPL-MCNC: 106 MG/DL — HIGH (ref 70–99)
HCT VFR BLD CALC: 24.3 % — LOW (ref 39–50)
HGB BLD-MCNC: 7.9 G/DL — LOW (ref 13–17)
MAGNESIUM SERPL-MCNC: 1.8 MG/DL — SIGNIFICANT CHANGE UP (ref 1.6–2.6)
MCHC RBC-ENTMCNC: 30.6 PG — SIGNIFICANT CHANGE UP (ref 27–34)
MCHC RBC-ENTMCNC: 32.5 GM/DL — SIGNIFICANT CHANGE UP (ref 32–36)
MCV RBC AUTO: 94.2 FL — SIGNIFICANT CHANGE UP (ref 80–100)
NRBC # BLD: 0 /100 WBCS — SIGNIFICANT CHANGE UP (ref 0–0)
PHOSPHATE SERPL-MCNC: 3.7 MG/DL — SIGNIFICANT CHANGE UP (ref 2.5–4.5)
PLATELET # BLD AUTO: 172 K/UL — SIGNIFICANT CHANGE UP (ref 150–400)
POTASSIUM SERPL-MCNC: 4.3 MMOL/L — SIGNIFICANT CHANGE UP (ref 3.5–5.3)
POTASSIUM SERPL-SCNC: 4.3 MMOL/L — SIGNIFICANT CHANGE UP (ref 3.5–5.3)
PROT SERPL-MCNC: 6.8 G/DL — SIGNIFICANT CHANGE UP (ref 6–8.3)
RBC # BLD: 2.58 M/UL — LOW (ref 4.2–5.8)
RBC # FLD: 14 % — SIGNIFICANT CHANGE UP (ref 10.3–14.5)
SODIUM SERPL-SCNC: 133 MMOL/L — LOW (ref 135–145)
T4 FREE SERPL-MCNC: 1.1 NG/DL — SIGNIFICANT CHANGE UP (ref 0.9–1.8)
TSH SERPL-MCNC: 4.42 UU/ML — SIGNIFICANT CHANGE UP (ref 0.34–4.82)
WBC # BLD: 5.88 K/UL — SIGNIFICANT CHANGE UP (ref 3.8–10.5)
WBC # FLD AUTO: 5.88 K/UL — SIGNIFICANT CHANGE UP (ref 3.8–10.5)

## 2023-04-29 PROCEDURE — 99233 SBSQ HOSP IP/OBS HIGH 50: CPT

## 2023-04-29 RX ORDER — SODIUM CHLORIDE 9 MG/ML
1000 INJECTION INTRAMUSCULAR; INTRAVENOUS; SUBCUTANEOUS
Refills: 0 | Status: DISCONTINUED | OUTPATIENT
Start: 2023-04-29 | End: 2023-04-30

## 2023-04-29 RX ORDER — IPRATROPIUM/ALBUTEROL SULFATE 18-103MCG
3 AEROSOL WITH ADAPTER (GRAM) INHALATION EVERY 6 HOURS
Refills: 0 | Status: DISCONTINUED | OUTPATIENT
Start: 2023-04-29 | End: 2023-04-30

## 2023-04-29 RX ORDER — IPRATROPIUM/ALBUTEROL SULFATE 18-103MCG
3 AEROSOL WITH ADAPTER (GRAM) INHALATION ONCE
Refills: 0 | Status: COMPLETED | OUTPATIENT
Start: 2023-04-29 | End: 2023-04-29

## 2023-04-29 RX ORDER — LANOLIN ALCOHOL/MO/W.PET/CERES
5 CREAM (GRAM) TOPICAL AT BEDTIME
Refills: 0 | Status: DISCONTINUED | OUTPATIENT
Start: 2023-04-29 | End: 2023-05-08

## 2023-04-29 RX ORDER — LANOLIN ALCOHOL/MO/W.PET/CERES
3 CREAM (GRAM) TOPICAL AT BEDTIME
Refills: 0 | Status: DISCONTINUED | OUTPATIENT
Start: 2023-04-29 | End: 2023-04-29

## 2023-04-29 RX ADMIN — FINASTERIDE 5 MILLIGRAM(S): 5 TABLET, FILM COATED ORAL at 11:54

## 2023-04-29 RX ADMIN — CHLORHEXIDINE GLUCONATE 1 APPLICATION(S): 213 SOLUTION TOPICAL at 06:39

## 2023-04-29 RX ADMIN — Medication 650 MILLIGRAM(S): at 06:39

## 2023-04-29 RX ADMIN — Medication 650 MILLIGRAM(S): at 15:00

## 2023-04-29 RX ADMIN — Medication 650 MILLIGRAM(S): at 21:50

## 2023-04-29 RX ADMIN — HEPARIN SODIUM 5000 UNIT(S): 5000 INJECTION INTRAVENOUS; SUBCUTANEOUS at 18:25

## 2023-04-29 RX ADMIN — Medication 3 MILLILITER(S): at 05:00

## 2023-04-29 RX ADMIN — Medication 3 MILLILITER(S): at 08:44

## 2023-04-29 RX ADMIN — BUDESONIDE AND FORMOTEROL FUMARATE DIHYDRATE 2 PUFF(S): 160; 4.5 AEROSOL RESPIRATORY (INHALATION) at 10:10

## 2023-04-29 RX ADMIN — Medication 5 MILLIGRAM(S): at 21:50

## 2023-04-29 RX ADMIN — SODIUM CHLORIDE 50 MILLILITER(S): 9 INJECTION INTRAMUSCULAR; INTRAVENOUS; SUBCUTANEOUS at 21:50

## 2023-04-29 RX ADMIN — SODIUM CHLORIDE 50 MILLILITER(S): 9 INJECTION INTRAMUSCULAR; INTRAVENOUS; SUBCUTANEOUS at 11:54

## 2023-04-29 RX ADMIN — Medication 3 MILLILITER(S): at 14:07

## 2023-04-29 RX ADMIN — BUDESONIDE AND FORMOTEROL FUMARATE DIHYDRATE 2 PUFF(S): 160; 4.5 AEROSOL RESPIRATORY (INHALATION) at 21:53

## 2023-04-29 RX ADMIN — Medication 3 MILLILITER(S): at 21:02

## 2023-04-29 RX ADMIN — TIOTROPIUM BROMIDE 2 PUFF(S): 18 CAPSULE ORAL; RESPIRATORY (INHALATION) at 11:54

## 2023-04-29 RX ADMIN — TAMSULOSIN HYDROCHLORIDE 0.4 MILLIGRAM(S): 0.4 CAPSULE ORAL at 21:50

## 2023-04-29 RX ADMIN — HEPARIN SODIUM 5000 UNIT(S): 5000 INJECTION INTRAVENOUS; SUBCUTANEOUS at 06:39

## 2023-04-29 NOTE — DIETITIAN INITIAL EVALUATION ADULT - PERTINENT MEDS FT
MEDICATIONS  (STANDING):  albuterol/ipratropium for Nebulization 3 milliLiter(s) Nebulizer every 6 hours  budesonide  80 MICROgram(s)/formoterol 4.5 MICROgram(s) Inhaler 2 Puff(s) Inhalation two times a day  calcitriol   Capsule 0.25 MICROGram(s) Oral <User Schedule>  chlorhexidine 2% Cloths 1 Application(s) Topical <User Schedule>  dextrose 5% + sodium chloride 0.45%. 1000 milliLiter(s) (50 mL/Hr) IV Continuous <Continuous>  finasteride 5 milliGRAM(s) Oral daily  heparin   Injectable 5000 Unit(s) SubCutaneous every 12 hours  melatonin 3 milliGRAM(s) Oral at bedtime  sodium bicarbonate 650 milliGRAM(s) Oral three times a day  sodium chloride 0.9%. 1000 milliLiter(s) (50 mL/Hr) IV Continuous <Continuous>  tamsulosin 0.4 milliGRAM(s) Oral at bedtime  tiotropium 2.5 MICROgram(s) Inhaler 2 Puff(s) Inhalation daily    MEDICATIONS  (PRN):  acetaminophen     Tablet .. 650 milliGRAM(s) Oral every 6 hours PRN Temp greater or equal to 38C (100.4F), Mild Pain (1 - 3)

## 2023-04-29 NOTE — PHYSICAL THERAPY INITIAL EVALUATION ADULT - GENERAL OBSERVATIONS, REHAB EVAL
Pt. received supine in bed in NAD; c/o weakness. Pt. on telemetry. he is alert, oriented x 2-3 and cooperative during eval.

## 2023-04-29 NOTE — PROGRESS NOTE ADULT - SUBJECTIVE AND OBJECTIVE BOX
PGY-1 Progress Note discussed with attending    PAGER #: [[phone]] TILL 5:00 PM  PLEASE CONTACT ON CALL TEAM:   - On Call Team (Please refer to Dustin) FROM 5:00 PM - 8:30PM  - Nightfloat Team FROM 8:30 -7:30 AM    INTERVAL HPI/OVERNIGHT EVENTS:   Overnight, pt stated he has air hunger, saturating well on room air. Was placed on nasal cannula by night team for comfort. Given duoneb treatment with improvement. Pt states his breathing is better this morning. He is asking for a sleeping medication for insomnia. States melatonin does not help.     REVIEW OF SYSTEMS:  CONSTITUTIONAL: No fever, weight loss. + Weakness  RESPIRATORY: No cough, wheezing, chills or hemoptysis; No shortness of breath  CARDIOVASCULAR: No chest pain, palpitations, dizziness, or leg swelling  GASTROINTESTINAL: No abdominal pain. No nausea, vomiting, or hematemesis; No diarrhea or constipation. No melena or hematochezia.  GENITOURINARY: No dysuria or hematuria, urinary frequency  NEUROLOGICAL: No headaches, memory loss, loss of strength, numbness, or tremors  SKIN: No itching, burning, rashes, or lesions     MEDICATIONS  (STANDING):  albuterol/ipratropium for Nebulization 3 milliLiter(s) Nebulizer every 6 hours  budesonide  80 MICROgram(s)/formoterol 4.5 MICROgram(s) Inhaler 2 Puff(s) Inhalation two times a day  calcitriol   Capsule 0.25 MICROGram(s) Oral <User Schedule>  chlorhexidine 2% Cloths 1 Application(s) Topical <User Schedule>  dextrose 5% + sodium chloride 0.45%. 1000 milliLiter(s) (50 mL/Hr) IV Continuous <Continuous>  finasteride 5 milliGRAM(s) Oral daily  heparin   Injectable 5000 Unit(s) SubCutaneous every 12 hours  sodium bicarbonate 650 milliGRAM(s) Oral three times a day  tamsulosin 0.4 milliGRAM(s) Oral at bedtime  tiotropium 2.5 MICROgram(s) Inhaler 2 Puff(s) Inhalation daily    MEDICATIONS  (PRN):  acetaminophen     Tablet .. 650 milliGRAM(s) Oral every 6 hours PRN Temp greater or equal to 38C (100.4F), Mild Pain (1 - 3)      Vital Signs Last 24 Hrs  T(C): 36.8 (29 Apr 2023 07:17), Max: 36.8 (28 Apr 2023 23:33)  T(F): 98.3 (29 Apr 2023 07:17), Max: 98.3 (29 Apr 2023 07:17)  HR: 90 (29 Apr 2023 07:17) (86 - 92)  BP: 104/50 (29 Apr 2023 07:17) (91/50 - 109/56)  BP(mean): --  RR: 18 (29 Apr 2023 07:17) (18 - 18)  SpO2: 98% (29 Apr 2023 07:17) (98% - 100%)    Parameters below as of 29 Apr 2023 07:17  Patient On (Oxygen Delivery Method): nasal cannula  O2 Flow (L/min): 3      PHYSICAL EXAMINATION:  GENERAL: NAD, elderly frail male  HEAD:  Atraumatic, Normocephalic  EYES:  conjunctiva and sclera clear  NECK: Supple, No JVD, Normal thyroid  CHEST/LUNG: + mild bibasilar crackles. Clear to percussion bilaterally; No rales, rhonchi, wheezing, or rubs  HEART: Regular rate and rhythm; No murmurs, rubs, or gallops  ABDOMEN: Soft, Nontender, Nondistended; Bowel sounds present  NERVOUS SYSTEM:  Alert & Oriented X3  EXTREMITIES:  2+ Peripheral Pulses, No clubbing, cyanosis, or edema  SKIN: warm dry, + bajwa                        7.9    5.88  )-----------( 172      ( 29 Apr 2023 06:20 )             24.3     04-29    133<L>  |  106  |  69<H>  ----------------------------<  106<H>  4.3   |  16<L>  |  4.03<H>    Ca    8.2<L>      29 Apr 2023 06:20  Phos  3.7     04-29  Mg     1.8     04-29    TPro  6.8  /  Alb  2.7<L>  /  TBili  0.4  /  DBili  x   /  AST  29  /  ALT  23  /  AlkPhos  51  04-29    LIVER FUNCTIONS - ( 29 Apr 2023 06:20 )  Alb: 2.7 g/dL / Pro: 6.8 g/dL / ALK PHOS: 51 U/L / ALT: 23 U/L DA / AST: 29 U/L / GGT: x                     Culture - Urine (collected 27 Apr 2023 00:41)  Source: Clean Catch Clean Catch (Midstream)  Final Report (28 Apr 2023 07:27):    No growth        I&O's Summary    28 Apr 2023 07:01  -  29 Apr 2023 07:00  --------------------------------------------------------  IN: 220 mL / OUT: 900 mL / NET: -680 mL    29 Apr 2023 07:01  -  29 Apr 2023 11:08  --------------------------------------------------------  IN: 200 mL / OUT: 0 mL / NET: 200 mL

## 2023-04-29 NOTE — DIETITIAN INITIAL EVALUATION ADULT - PROBLEM SELECTOR PLAN 2
patient presents with elevated Cr to 5.3, unclear baseline however recently elevated up to 6.8 and improving since prior admission  associated with urinary retention  BUN/Cr 92/5.3 > 84/5.0  bajwa placed by urology overnight per patient request in ED  CT shows renal pelvis fullness of bilateral renal pelvis without calcified stone identified along the course the ureter. Bladder wall thickening.    - maintain bajwa  - monitor Cr  - avoid nephrotoxic agents

## 2023-04-29 NOTE — DIETITIAN INITIAL EVALUATION ADULT - PROBLEM SELECTOR PLAN 1
pt presents with elevated K, urinary retention after failing trial of void  improved after 500 cc NS, D50, insulin 2.5 U, lokelma 10 mg given by ED  EKG shows peaked t waves    - monitor K, avoid nephrotoxic agents  - telemetry monitoring

## 2023-04-29 NOTE — DIETITIAN INITIAL EVALUATION ADULT - FACTORS AFF FOOD INTAKE
weakness, abdominal pain, hyperkalemia/difficulty chewing/difficulty swallowing/difficulty with food procurement/preparation/Orthodox/ethnic/cultural/personal food preferences/other (specify)

## 2023-04-29 NOTE — PROGRESS NOTE ADULT - PROBLEM SELECTOR PLAN 2
likely 2/2 to prostate enlargement  c/w Flomax and finasteride  plan as above  Urology following  Will need outpatient follow up for bajwa management

## 2023-04-29 NOTE — DIETITIAN NUTRITION RISK NOTIFICATION - TREATMENT: THE FOLLOWING DIET HAS BEEN RECOMMENDED
Diet, Pureed:   Moderately Thick Liquids (MODTHICKLIQS)  Supplement Feeding Modality:  Oral  Ensure Pudding Cans or Servings Per Day:  1       Frequency:  Three Times a day (04-29-23 @ 13:19) [Pending Verification By Attending]  Diet, Pureed (04-27-23 @ 02:26) [Active]

## 2023-04-29 NOTE — PROGRESS NOTE ADULT - PROBLEM SELECTOR PLAN 4
CT shows redemonstrated pneumomediastinum, continued findings of diffuse lung fibrosis with peripheral/basilar reticular opacities and honeycombing.   CT Surgery recommended no acute intervention last admission  Was noted to be on Azithro/Rocephin last admission for suspected PNA- pt has no clinical of signs of pneumonia, will hold off Abx for now  c/w Spiriva, Symbicort and Albuterol  Pt prefers duoneb inhalers  saturating well on room air  Dr. Miri Mcclure following

## 2023-04-29 NOTE — DIETITIAN INITIAL EVALUATION ADULT - OTHER INFO
Patient from home lives with son admitted for urinary retention. Visited pt. alert, weak & hard of hearing, Patient from home lives with alone & recently discharged from [facility]ICU on 4/23/23. Visited pt. alert, weak, on nasal cannula (3 Liters) & hard of hearing, pt. requesting "thickened liquids" & "barium swallow evaluation" since recently with "difficulty swallowing" regular liquids. Stated usually consumes "chopped up foods" at home provided by son or "cooks his own meals, mostly "eating smaller portions" & tries to "have 3 meals daily". Pt admits to significant weight loss within 1 month? loss 35 Lbs, dosing wt. 91.2 Lbs on 04/29/23, agreeable to nutrition focus physical exam, see below fields. Presently pt. consuming ~25-30% of meal, provide "few food preferences", accepted nutrition supplement, per flow intake 26-50% noted, encourage po intake with meal set up. Nephro/Urology team following.

## 2023-04-29 NOTE — PROGRESS NOTE ADULT - ATTENDING COMMENTS
87M from home with PMH of pulmonary fibrosis recent ICU admission with pneumothorax and urinary retention then leaving AMA presents with urinary retention and abdominal discomfort found to have acute hyperkalemia and SANDI. S/p bajwa placement in ER with improvement in Cr and K    HyperK resolved  SANDI likely ATN, obstructive, improving s/p bajwa  Uretention  Pulm fibrosis    -sandi improving with bajwa - appreciate urology recs  -appreciate nephro recs  -c/w ivf  -hyperK resolved  -c/w spiriva, symbicort, albuterol  -f/u pulm recs  -dvt ppx  -PT eval 87M from home with PMH of pulmonary fibrosis recent ICU admission with pneumothorax and urinary retention then leaving AMA presents with urinary retention and abdominal discomfort found to have acute hyperkalemia and SANDI. S/p bajwa placement in ER with improvement in Cr and K    HyperK resolved  SANDI likely ATN, obstructive, improving s/p bajwa  Uretention  Pulm fibrosis  AHRF    -sandi improving with bajwa - appreciate urology recs - started finasteride, flomax  -appreciate nephro recs  -c/w ivf  -hyperK resolved  -appreciate pulm recs: hypoxic today, continue duonebs standing, symbicort, spiriva  -f/u pulm recs  -dvt ppx  -PT rec JOSE GUADALUPE    pending JOSE GUADALUPE

## 2023-04-29 NOTE — PROGRESS NOTE ADULT - PROBLEM SELECTOR PLAN 3
pt with NAGMA, similar to previous admission  HCO3 16 likely due to acute renal failure  c/w sodium bicarbonate TID and calcitriol  Nephro following

## 2023-04-29 NOTE — DIETITIAN INITIAL EVALUATION ADULT - PERTINENT LABORATORY DATA
04-29    133<L>  |  106  |  69<H>  ----------------------------<  106<H>  4.3   |  16<L>  |  4.03<H>    Ca    8.2<L>      29 Apr 2023 06:20  Phos  3.7     04-29  Mg     1.8     04-29    TPro  6.8  /  Alb  2.7<L>  /  TBili  0.4  /  DBili  x   /  AST  29  /  ALT  23  /  AlkPhos  51  04-29

## 2023-04-29 NOTE — PROGRESS NOTE ADULT - SUBJECTIVE AND OBJECTIVE BOX
Rafael Bradshaw MD (Nephrology progress note)  [address], [facility],  [address],  [place]  TEl: [phone]  Cell: [phone]    Patient is a 87y Male seen and evaluated at bedside. Vital signs, laboratory data, imaging studies, consult notes reviewed done within past 24 hours. Overnight events noted.    Allergies    No Known Allergies    Intolerances        ROS:  CONSTITUTIONAL: No fever or chills.  HEENT: No headcahe or visual disturbances.  RESPIRATORY: No shortness of breath, cough, hemoptysis or wheezing  CARDIOVASCULAR: No Chest pain, shortness of breath, palpitations, dizziness, syncope, orthopnea, PND or leg swelling.  GASTROINTESTINAL: No abdominal pain, nausea, vomiting, diarrhea, hematemesis or blood per rectum.  GENITOURINARY: No urinary frequency, urgency, gross hematuria, dysuria, flank or supra pubic pain, difficulty passing urine.  NEUROLOGICAL: No headache, focal limb weakness, tingling or numbness or seizure like activity  SKIN: No skin rash or lesion  MUSCULOSKELETAL: No leg pain, calf pain or swelling    VITALS:    T(C): 36.8 (04-29-23 @ 07:17), Max: 36.9 (04-28-23 @ 11:03)  HR: 90 (04-29-23 @ 07:17) (86 - 92)  BP: 104/50 (04-29-23 @ 07:17) (91/50 - 109/56)  RR: 18 (04-29-23 @ 07:17) (18 - 18)  SpO2: 98% (04-29-23 @ 07:17) (98% - 100%)  CAPILLARY BLOOD GLUCOSE          04-28-23 @ 07:01  -  04-29-23 @ 07:00  --------------------------------------------------------  IN: 220 mL / OUT: 900 mL / NET: -680 mL    04-29-23 @ 07:01  -  04-29-23 @ 08:48  --------------------------------------------------------  IN: 200 mL / OUT: 0 mL / NET: 200 mL      MEDICATIONS  (STANDING):  albuterol/ipratropium for Nebulization 3 milliLiter(s) Nebulizer every 6 hours  budesonide  80 MICROgram(s)/formoterol 4.5 MICROgram(s) Inhaler 2 Puff(s) Inhalation two times a day  calcitriol   Capsule 0.25 MICROGram(s) Oral <User Schedule>  chlorhexidine 2% Cloths 1 Application(s) Topical <User Schedule>  dextrose 5% + sodium chloride 0.45%. 1000 milliLiter(s) (50 mL/Hr) IV Continuous <Continuous>  finasteride 5 milliGRAM(s) Oral daily  heparin   Injectable 5000 Unit(s) SubCutaneous every 12 hours  sodium bicarbonate 650 milliGRAM(s) Oral three times a day  tamsulosin 0.4 milliGRAM(s) Oral at bedtime  tiotropium 2.5 MICROgram(s) Inhaler 2 Puff(s) Inhalation daily    MEDICATIONS  (PRN):  acetaminophen     Tablet .. 650 milliGRAM(s) Oral every 6 hours PRN Temp greater or equal to 38C (100.4F), Mild Pain (1 - 3)      PHYSICAL EXAM:  GENERAL: Alert, awake and oriented x3 in no distress  HEENT: MAGALYS, EOMI, neck supple, no JVP, no carotid bruit, oral mucosa moist and pink.  CHEST/LUNG: Bilateral clear breath sounds, no rales, no crepitations, no rhonchi, no wheezing  HEART: Regular rate and rhythm, CHANTAL II/VI at LPSB, no gallops, no rub   ABDOMEN: Soft, nontender, non distended, bowel sounds present, no palpable organomegaly  : No flank or supra pubic tenderness.  EXTREMITIES: Peripheral pulses are palpable, no pedal edema  Neurology: AAOx3, no focal neurological deficit  SKIN: No rash or skin lesion  Musculoskeletal: No joint deformity or spinal tenderness.      Vascular ACCESS:     LABS:                        7.9    5.88  )-----------( 172      ( 29 Apr 2023 06:20 )             24.3     04-29    133<L>  |  106  |  69<H>  ----------------------------<  106<H>  4.3   |  16<L>  |  4.03<H>    Ca    8.2<L>      29 Apr 2023 06:20  Phos  3.7     04-29  Mg     1.8     04-29    TPro  6.8  /  Alb  2.7<L>  /  TBili  0.4  /  DBili  x   /  AST  29  /  ALT  23  /  AlkPhos  51  04-29                RADIOLOGY & ADDITIONAL STUDIES:    Imaging Personally Reviewed:  [x] YES  [ ] NO    Consultant(s) Notes Reviewed:  [x] YES  [ ] NO    Care Discussed with Primary team/ Other Providers [x] YES  [ ] NO       Rafael Bradshaw MD (Nephrology progress note)  [address], [facility],  [address],  [place]  TEl: [phone]  Cell: [phone]    Patient is a 87y Male seen and evaluated at bedside. Vital signs, laboratory data, imaging studies, consult notes reviewed done within past 24 hours. Overnight events noted. Patient lying in bed in no distress feels better. Interval improving S cr to 4 with non oliguria. Hgb 7.9. Lomax's catheter present    Allergies    No Known Allergies    Intolerances        ROS:  Limited  Denies any chest pain, SOB  Feels better  Eating breakfast    VITALS:    T(C): 36.8 (04-29-23 @ 07:17), Max: 36.9 (04-28-23 @ 11:03)  HR: 90 (04-29-23 @ 07:17) (86 - 92)  BP: 104/50 (04-29-23 @ 07:17) (91/50 - 109/56)  RR: 18 (04-29-23 @ 07:17) (18 - 18)  SpO2: 98% (04-29-23 @ 07:17) (98% - 100%)  CAPILLARY BLOOD GLUCOSE          04-28-23 @ 07:01  -  04-29-23 @ 07:00  --------------------------------------------------------  IN: 220 mL / OUT: 900 mL / NET: -680 mL    04-29-23 @ 07:01  -  04-29-23 @ 08:48  --------------------------------------------------------  IN: 200 mL / OUT: 0 mL / NET: 200 mL      MEDICATIONS  (STANDING):  albuterol/ipratropium for Nebulization 3 milliLiter(s) Nebulizer every 6 hours  budesonide  80 MICROgram(s)/formoterol 4.5 MICROgram(s) Inhaler 2 Puff(s) Inhalation two times a day  calcitriol   Capsule 0.25 MICROGram(s) Oral <User Schedule>  chlorhexidine 2% Cloths 1 Application(s) Topical <User Schedule>  dextrose 5% + sodium chloride 0.45%. 1000 milliLiter(s) (50 mL/Hr) IV Continuous <Continuous>  finasteride 5 milliGRAM(s) Oral daily  heparin   Injectable 5000 Unit(s) SubCutaneous every 12 hours  sodium bicarbonate 650 milliGRAM(s) Oral three times a day  tamsulosin 0.4 milliGRAM(s) Oral at bedtime  tiotropium 2.5 MICROgram(s) Inhaler 2 Puff(s) Inhalation daily    MEDICATIONS  (PRN):  acetaminophen     Tablet .. 650 milliGRAM(s) Oral every 6 hours PRN Temp greater or equal to 38C (100.4F), Mild Pain (1 - 3)      PHYSICAL EXAM:  GENERAL: Alert, awake and oriented x3 in no distress  HEENT: MAGALYS, EOMI, neck supple, no JVP, no carotid bruit, oral mucosa moist and pink.  CHEST/LUNG: Bilateral clear breath sounds, no rales, no crepitations, no rhonchi, no wheezing  HEART: Regular rate and rhythm, CHANTAL II/VI at LPSB, no gallops, no rub   ABDOMEN: Soft, nontender, non distended, bowel sounds present, no palpable organomegaly  : No flank or supra pubic tenderness.  EXTREMITIES: Peripheral pulses are palpable, no pedal edema  Neurology: AAOx2-3, no focal neurological deficit  SKIN: No rash or skin lesion  Musculoskeletal: No joint deformity or spinal tenderness.      Vascular ACCESS:     LABS:                        7.9    5.88  )-----------( 172      ( 29 Apr 2023 06:20 )             24.3     04-29    133<L>  |  106  |  69<H>  ----------------------------<  106<H>  4.3   |  16<L>  |  4.03<H>    Ca    8.2<L>      29 Apr 2023 06:20  Phos  3.7     04-29  Mg     1.8     04-29    TPro  6.8  /  Alb  2.7<L>  /  TBili  0.4  /  DBili  x   /  AST  29  /  ALT  23  /  AlkPhos  51  04-29                RADIOLOGY & ADDITIONAL STUDIES:    Imaging Personally Reviewed:  [x] YES  [ ] NO    Consultant(s) Notes Reviewed:  [x] YES  [ ] NO    Care Discussed with Primary team/ Other Providers [x] YES  [ ] NO

## 2023-04-29 NOTE — PROGRESS NOTE ADULT - PROBLEM SELECTOR PLAN 1
patient presents with elevated Cr to 5.3, unclear baseline however recently elevated up to 6.8 and improving since prior admission  associated with urinary retention  bajwa placed by urology overnight per patient request in ED  CT shows renal pelvis fullness of bilateral renal pelvis without calcified stone identified along the course the ureter. Bladder wall thickening. Prostate Enlargement.    - maintain bajwa on discharge  - monitor Cr  - avoid nephrotoxic agents  - Cr improving daily  - NephDr. Augustine silva following

## 2023-04-29 NOTE — PROGRESS NOTE ADULT - ASSESSMENT
87 year old M from home with no known PMH presents with urinary retention. Patient reports since being discharged he has had very little urine output. Denies any blood in urine or pain. Denies fever, chills, cp, sob, palpitations, lightheadedness, dizziness, n/v/d, abdominal pain, constipation.  Nephrology consult called for SANDI    Assessment:  1) Non oliguric SANDI with probable underlying CKD with improving renal function with S cr 4. likely pre-renal/dehydration/ ATN/Obstructive uropathy  2) Hyperkalemia now resolved  3) Acute urinary retention  4) History of hypoxic respiratory failure  5) BPH with LUTs  6) Anemia of chronic kidney disease  7) Renal osteodystrophy  8) NAGMA  9) IPF    Recommend:  Strict I/o  Avoid Nephrotoxic agents  S/p Lomax's catheter  Monitor BMP and electrolytes daily  Continue Flomax and finasteride  CT abdomen and pelvis without contrast results reviewed  Urology/pulmonary consult reviewed  IV/po hydration with watchful respiratory status  Anemia panel/CKD work up reviewed  Continue Sodium bicarbonate 650 mg po tid with meals with goal serum bicarbonate>22  Continue po calcitriol 0.25 mcg po TIW  PSA within normal limits  Volume status and electrolytes noted  No urgent need for RRT/HD  GOC/ Advance directives per patient/family  Discharge planning per primary [place]  Will follow

## 2023-04-29 NOTE — PROGRESS NOTE ADULT - ASSESSMENT
87 year old M from home with PMH of pulmonary fibrosis, pertinent recent admission to [facility] ICU (4/23-4/26) for close monitoring. CT show pulmonary fibrosis with pneumomediastinum and small right apical PTX . He was initially started on BiPaP changed to NC. NAGMA due to SANDI. Pt left AMA. Pt presents with urinary retention and acute hyperkalemia admitted to telemetry for management of hyperkalemia and SANDI.

## 2023-04-29 NOTE — PHYSICAL THERAPY INITIAL EVALUATION ADULT - ADDITIONAL COMMENTS
The patient is a 25y Male complaining of aggressive behavior.
According to pt, he did not use an assistive device for ambulation.

## 2023-04-30 LAB
ALBUMIN SERPL ELPH-MCNC: 2.5 G/DL — LOW (ref 3.5–5)
ALP SERPL-CCNC: 50 U/L — SIGNIFICANT CHANGE UP (ref 40–120)
ALT FLD-CCNC: 21 U/L DA — SIGNIFICANT CHANGE UP (ref 10–60)
ANION GAP SERPL CALC-SCNC: 8 MMOL/L — SIGNIFICANT CHANGE UP (ref 5–17)
AST SERPL-CCNC: 18 U/L — SIGNIFICANT CHANGE UP (ref 10–40)
BILIRUB SERPL-MCNC: 0.3 MG/DL — SIGNIFICANT CHANGE UP (ref 0.2–1.2)
BUN SERPL-MCNC: 62 MG/DL — HIGH (ref 7–18)
CALCIUM SERPL-MCNC: 8 MG/DL — LOW (ref 8.4–10.5)
CHLORIDE SERPL-SCNC: 109 MMOL/L — HIGH (ref 96–108)
CO2 SERPL-SCNC: 20 MMOL/L — LOW (ref 22–31)
CREAT SERPL-MCNC: 3.96 MG/DL — HIGH (ref 0.5–1.3)
EGFR: 14 ML/MIN/1.73M2 — LOW
GLUCOSE SERPL-MCNC: 114 MG/DL — HIGH (ref 70–99)
HCT VFR BLD CALC: 22.3 % — LOW (ref 39–50)
HGB BLD-MCNC: 7.4 G/DL — LOW (ref 13–17)
MAGNESIUM SERPL-MCNC: 1.6 MG/DL — SIGNIFICANT CHANGE UP (ref 1.6–2.6)
MCHC RBC-ENTMCNC: 31.6 PG — SIGNIFICANT CHANGE UP (ref 27–34)
MCHC RBC-ENTMCNC: 33.2 GM/DL — SIGNIFICANT CHANGE UP (ref 32–36)
MCV RBC AUTO: 95.3 FL — SIGNIFICANT CHANGE UP (ref 80–100)
NRBC # BLD: 0 /100 WBCS — SIGNIFICANT CHANGE UP (ref 0–0)
PHOSPHATE SERPL-MCNC: 4.1 MG/DL — SIGNIFICANT CHANGE UP (ref 2.5–4.5)
PLATELET # BLD AUTO: 175 K/UL — SIGNIFICANT CHANGE UP (ref 150–400)
POTASSIUM SERPL-MCNC: 5 MMOL/L — SIGNIFICANT CHANGE UP (ref 3.5–5.3)
POTASSIUM SERPL-SCNC: 5 MMOL/L — SIGNIFICANT CHANGE UP (ref 3.5–5.3)
PROT SERPL-MCNC: 6.6 G/DL — SIGNIFICANT CHANGE UP (ref 6–8.3)
RBC # BLD: 2.34 M/UL — LOW (ref 4.2–5.8)
RBC # FLD: 13.7 % — SIGNIFICANT CHANGE UP (ref 10.3–14.5)
SODIUM SERPL-SCNC: 137 MMOL/L — SIGNIFICANT CHANGE UP (ref 135–145)
WBC # BLD: 5.45 K/UL — SIGNIFICANT CHANGE UP (ref 3.8–10.5)
WBC # FLD AUTO: 5.45 K/UL — SIGNIFICANT CHANGE UP (ref 3.8–10.5)

## 2023-04-30 PROCEDURE — 99232 SBSQ HOSP IP/OBS MODERATE 35: CPT

## 2023-04-30 RX ORDER — FERROUS SULFATE 325(65) MG
325 TABLET ORAL DAILY
Refills: 0 | Status: DISCONTINUED | OUTPATIENT
Start: 2023-04-30 | End: 2023-05-08

## 2023-04-30 RX ORDER — ERYTHROPOIETIN 10000 [IU]/ML
10000 INJECTION, SOLUTION INTRAVENOUS; SUBCUTANEOUS ONCE
Refills: 0 | Status: COMPLETED | OUTPATIENT
Start: 2023-04-30 | End: 2023-04-30

## 2023-04-30 RX ORDER — IPRATROPIUM/ALBUTEROL SULFATE 18-103MCG
3 AEROSOL WITH ADAPTER (GRAM) INHALATION EVERY 4 HOURS
Refills: 0 | Status: DISCONTINUED | OUTPATIENT
Start: 2023-04-30 | End: 2023-05-01

## 2023-04-30 RX ADMIN — TIOTROPIUM BROMIDE 2 PUFF(S): 18 CAPSULE ORAL; RESPIRATORY (INHALATION) at 12:22

## 2023-04-30 RX ADMIN — BUDESONIDE AND FORMOTEROL FUMARATE DIHYDRATE 2 PUFF(S): 160; 4.5 AEROSOL RESPIRATORY (INHALATION) at 12:19

## 2023-04-30 RX ADMIN — Medication 3 MILLILITER(S): at 09:13

## 2023-04-30 RX ADMIN — TAMSULOSIN HYDROCHLORIDE 0.4 MILLIGRAM(S): 0.4 CAPSULE ORAL at 21:25

## 2023-04-30 RX ADMIN — ERYTHROPOIETIN 10000 UNIT(S): 10000 INJECTION, SOLUTION INTRAVENOUS; SUBCUTANEOUS at 12:36

## 2023-04-30 RX ADMIN — Medication 325 MILLIGRAM(S): at 12:36

## 2023-04-30 RX ADMIN — Medication 650 MILLIGRAM(S): at 14:19

## 2023-04-30 RX ADMIN — HEPARIN SODIUM 5000 UNIT(S): 5000 INJECTION INTRAVENOUS; SUBCUTANEOUS at 06:12

## 2023-04-30 RX ADMIN — Medication 5 MILLIGRAM(S): at 21:25

## 2023-04-30 RX ADMIN — CHLORHEXIDINE GLUCONATE 1 APPLICATION(S): 213 SOLUTION TOPICAL at 06:12

## 2023-04-30 RX ADMIN — Medication 650 MILLIGRAM(S): at 06:12

## 2023-04-30 RX ADMIN — Medication 650 MILLIGRAM(S): at 21:26

## 2023-04-30 RX ADMIN — Medication 3 MILLILITER(S): at 22:00

## 2023-04-30 RX ADMIN — Medication 3 MILLILITER(S): at 03:00

## 2023-04-30 RX ADMIN — HEPARIN SODIUM 5000 UNIT(S): 5000 INJECTION INTRAVENOUS; SUBCUTANEOUS at 17:21

## 2023-04-30 RX ADMIN — Medication 3 MILLILITER(S): at 14:59

## 2023-04-30 RX ADMIN — FINASTERIDE 5 MILLIGRAM(S): 5 TABLET, FILM COATED ORAL at 12:20

## 2023-04-30 RX ADMIN — BUDESONIDE AND FORMOTEROL FUMARATE DIHYDRATE 2 PUFF(S): 160; 4.5 AEROSOL RESPIRATORY (INHALATION) at 21:25

## 2023-04-30 NOTE — PROGRESS NOTE ADULT - ASSESSMENT
87M from home with PMH of pulmonary fibrosis recent ICU admission with pneumothorax and urinary retention then leaving AMA presents with urinary retention and abdominal discomfort found to have acute hyperkalemia and SANDI. S/p bajwa placement in ER with improvement in Cr and K    HyperK resolved  SANDI likely ATN, obstructive, improving s/p bajwa  Uretention  Pulm fibrosis  AHRF    -sandi improving with bajwa - appreciate urology recs - started finasteride, flomax  -appreciate nephro recs  -c/w ivf  -hyperK resolved  -appreciate pulm recs: hypoxic today, continue duonebs standing, symbicort, spiriva  -f/u pulm recs  -dvt ppx  -PT rec JOSE GUADALUPE    pending JOSE GUADALUPE.

## 2023-04-30 NOTE — PROGRESS NOTE ADULT - SUBJECTIVE AND OBJECTIVE BOX
Interval of present illness: No acute events overnight. Pt seen at bedside. No new complaints.    REVIEW OF SYSTEMS:    CONSTITUTIONAL: No fever  EYES: No acute visual disturbances  NECK: No pain or stiffness  RESPIRATORY: No cough; No shortness of breath  CARDIOVASCULAR: No chest pain, no palpitations  GASTROINTESTINAL: No pain. No nausea or vomiting.  No diarrhea   NEUROLOGICAL: No headache or numbness, no tremors  MUSCULOSKELETAL: no muscle pain  GENITOURINARY: No dysuria, no frequency, no hesitancy  PSYCHIATRY: No depression , no anxiety  ALL OTHER  ROS negative     O:  Vital Signs Last 24 Hrs  T(C): 36.8 (30 Apr 2023 11:15), Max: 37.1 (30 Apr 2023 07:35)  T(F): 98.3 (30 Apr 2023 11:15), Max: 98.7 (30 Apr 2023 07:35)  HR: 104 (30 Apr 2023 11:15) (101 - 110)  BP: 108/55 (30 Apr 2023 11:15) (100/48 - 112/62)  BP(mean): --  RR: 18 (30 Apr 2023 11:15) (18 - 20)  SpO2: 98% (30 Apr 2023 11:15) (96% - 100%)    Parameters below as of 30 Apr 2023 11:15  Patient On (Oxygen Delivery Method): room air        Gen: NAD  Neuro: alert, answering qs appropriately, moves all extremities  HEENT: anicteric, moist oral mucosa  Neck: supple, no JVD elevation  Cards: RRR  Pulm: good inspiratory effort, CTAB  Abd: soft, NT/ND, BS+  Ext: no edema  Skin: warm, dry      acetaminophen     Tablet .. 650 milliGRAM(s) Oral every 6 hours PRN  albuterol/ipratropium for Nebulization 3 milliLiter(s) Nebulizer every 6 hours  budesonide  80 MICROgram(s)/formoterol 4.5 MICROgram(s) Inhaler 2 Puff(s) Inhalation two times a day  calcitriol   Capsule 0.25 MICROGram(s) Oral <User Schedule>  chlorhexidine 2% Cloths 1 Application(s) Topical <User Schedule>  dextrose 5% + sodium chloride 0.45%. 1000 milliLiter(s) IV Continuous <Continuous>  ferrous    sulfate 325 milliGRAM(s) Oral daily  finasteride 5 milliGRAM(s) Oral daily  heparin   Injectable 5000 Unit(s) SubCutaneous every 12 hours  melatonin 5 milliGRAM(s) Oral at bedtime  sodium bicarbonate 650 milliGRAM(s) Oral three times a day  sodium chloride 0.9%. 1000 milliLiter(s) IV Continuous <Continuous>  tamsulosin 0.4 milliGRAM(s) Oral at bedtime  tiotropium 2.5 MICROgram(s) Inhaler 2 Puff(s) Inhalation daily                            7.4    5.45  )-----------( 175      ( 30 Apr 2023 04:59 )             22.3       04-30    137  |  109<H>  |  62<H>  ----------------------------<  114<H>  5.0   |  20<L>  |  3.96<H>    Ca    8.0<L>      30 Apr 2023 04:59  Phos  4.1     04-30  Mg     1.6     04-30    TPro  6.6  /  Alb  2.5<L>  /  TBili  0.3  /  DBili  x   /  AST  18  /  ALT  21  /  AlkPhos  50  04-30

## 2023-04-30 NOTE — PROGRESS NOTE ADULT - SUBJECTIVE AND OBJECTIVE BOX
Rafael Bradshaw MD (Nephrology progress note)  [address], [facility],  [address],  [facility]  TEl: [phone]  Cell: [phone]    Patient is a 87y Male seen and evaluated at bedside. Vital signs, laboratory data, imaging studies, consult notes reviewed done within past 24 hours. Overnight events noted.    Allergies    No Known Allergies    Intolerances        ROS:  CONSTITUTIONAL: No fever or chills.  HEENT: No headcahe or visual disturbances.  RESPIRATORY: No shortness of breath, cough, hemoptysis or wheezing  CARDIOVASCULAR: No Chest pain, shortness of breath, palpitations, dizziness, syncope, orthopnea, PND or leg swelling.  GASTROINTESTINAL: No abdominal pain, nausea, vomiting, diarrhea, hematemesis or blood per rectum.  GENITOURINARY: No urinary frequency, urgency, gross hematuria, dysuria, flank or supra pubic pain, difficulty passing urine.  NEUROLOGICAL: No headache, focal limb weakness, tingling or numbness or seizure like activity  SKIN: No skin rash or lesion  MUSCULOSKELETAL: No leg pain, calf pain or swelling    VITALS:    T(C): 36.8 (04-30-23 @ 11:15), Max: 37.1 (04-30-23 @ 07:35)  HR: 104 (04-30-23 @ 11:15) (101 - 110)  BP: 108/55 (04-30-23 @ 11:15) (100/48 - 112/62)  RR: 18 (04-30-23 @ 11:15) (18 - 20)  SpO2: 98% (04-30-23 @ 11:15) (96% - 100%)  CAPILLARY BLOOD GLUCOSE          04-29-23 @ 07:01  -  04-30-23 @ 07:00  --------------------------------------------------------  IN: 430 mL / OUT: 400 mL / NET: 30 mL      MEDICATIONS  (STANDING):  albuterol/ipratropium for Nebulization 3 milliLiter(s) Nebulizer every 6 hours  budesonide  80 MICROgram(s)/formoterol 4.5 MICROgram(s) Inhaler 2 Puff(s) Inhalation two times a day  calcitriol   Capsule 0.25 MICROGram(s) Oral <User Schedule>  chlorhexidine 2% Cloths 1 Application(s) Topical <User Schedule>  dextrose 5% + sodium chloride 0.45%. 1000 milliLiter(s) (50 mL/Hr) IV Continuous <Continuous>  epoetin racheal-epbx (RETACRIT) Injectable 82154 Unit(s) SubCutaneous once  ferrous    sulfate 325 milliGRAM(s) Oral daily  finasteride 5 milliGRAM(s) Oral daily  heparin   Injectable 5000 Unit(s) SubCutaneous every 12 hours  melatonin 5 milliGRAM(s) Oral at bedtime  sodium bicarbonate 650 milliGRAM(s) Oral three times a day  sodium chloride 0.9%. 1000 milliLiter(s) (50 mL/Hr) IV Continuous <Continuous>  tamsulosin 0.4 milliGRAM(s) Oral at bedtime  tiotropium 2.5 MICROgram(s) Inhaler 2 Puff(s) Inhalation daily    MEDICATIONS  (PRN):  acetaminophen     Tablet .. 650 milliGRAM(s) Oral every 6 hours PRN Temp greater or equal to 38C (100.4F), Mild Pain (1 - 3)      PHYSICAL EXAM:  GENERAL: Alert, awake and oriented x3 in no distress  HEENT: MAGALYS, EOMI, neck supple, no JVP, no carotid bruit, oral mucosa moist and pink.  CHEST/LUNG: Bilateral clear breath sounds, no rales, no crepitations, no rhonchi, no wheezing  HEART: Regular rate and rhythm, CHANTAL II/VI at LPSB, no gallops, no rub   ABDOMEN: Soft, nontender, non distended, bowel sounds present, no palpable organomegaly  : No flank or supra pubic tenderness.  EXTREMITIES: Peripheral pulses are palpable, no pedal edema  Neurology: AAOx3, no focal neurological deficit  SKIN: No rash or skin lesion  Musculoskeletal: No joint deformity or spinal tenderness.      Vascular ACCESS:     LABS:                        7.4    5.45  )-----------( 175      ( 30 Apr 2023 04:59 )             22.3     04-30    137  |  109<H>  |  62<H>  ----------------------------<  114<H>  5.0   |  20<L>  |  3.96<H>    Ca    8.0<L>      30 Apr 2023 04:59  Phos  4.1     04-30  Mg     1.6     04-30    TPro  6.6  /  Alb  2.5<L>  /  TBili  0.3  /  DBili  x   /  AST  18  /  ALT  21  /  AlkPhos  50  04-30                RADIOLOGY & ADDITIONAL STUDIES:    Imaging Personally Reviewed:  [x] YES  [ ] NO    Consultant(s) Notes Reviewed:  [x] YES  [ ] NO    Care Discussed with Primary team/ Other Providers [x] YES  [ ] NO       Rafael Bradshaw MD (Nephrology progress note)  [address], [facility],  [address],  [facility]  TEl: [phone]  Cell: [phone]    Patient is a 87y Male seen and evaluated at bedside. Vital signs, laboratory data, imaging studies, consult notes reviewed done within past 24 hours. Overnight events noted. Patient alert and awake in no distress feels better. Able to tolerate po intake. Interval slowly improving renal function with S cr 3.9 with non oliguria. K level 5. Hgb 7.4 with no active bleeding    Allergies    No Known Allergies    Intolerances        ROS:  CONSTITUTIONAL: No fever or chills.  HEENT: No headache or visual disturbances.  RESPIRATORY: No shortness of breath, cough, hemoptysis or wheezing  CARDIOVASCULAR: No Chest pain or SOB  GASTROINTESTINAL: No abdominal pain, nausea, vomiting, diarrhea, hematemesis or blood per rectum.  GENITOURINARY: No flank or supra pubic pain  NEUROLOGICAL: No headache, focal limb weakness, tingling or numbness  SKIN: No skin rash or lesion  MUSCULOSKELETAL: No leg pain, calf pain or swelling    VITALS:    T(C): 36.8 (04-30-23 @ 11:15), Max: 37.1 (04-30-23 @ 07:35)  HR: 104 (04-30-23 @ 11:15) (101 - 110)  BP: 108/55 (04-30-23 @ 11:15) (100/48 - 112/62)  RR: 18 (04-30-23 @ 11:15) (18 - 20)  SpO2: 98% (04-30-23 @ 11:15) (96% - 100%)  CAPILLARY BLOOD GLUCOSE          04-29-23 @ 07:01  -  04-30-23 @ 07:00  --------------------------------------------------------  IN: 430 mL / OUT: 400 mL / NET: 30 mL      MEDICATIONS  (STANDING):  albuterol/ipratropium for Nebulization 3 milliLiter(s) Nebulizer every 6 hours  budesonide  80 MICROgram(s)/formoterol 4.5 MICROgram(s) Inhaler 2 Puff(s) Inhalation two times a day  calcitriol   Capsule 0.25 MICROGram(s) Oral <User Schedule>  chlorhexidine 2% Cloths 1 Application(s) Topical <User Schedule>  dextrose 5% + sodium chloride 0.45%. 1000 milliLiter(s) (50 mL/Hr) IV Continuous <Continuous>  epoetin racheal-epbx (RETACRIT) Injectable 81522 Unit(s) SubCutaneous once  ferrous    sulfate 325 milliGRAM(s) Oral daily  finasteride 5 milliGRAM(s) Oral daily  heparin   Injectable 5000 Unit(s) SubCutaneous every 12 hours  melatonin 5 milliGRAM(s) Oral at bedtime  sodium bicarbonate 650 milliGRAM(s) Oral three times a day  sodium chloride 0.9%. 1000 milliLiter(s) (50 mL/Hr) IV Continuous <Continuous>  tamsulosin 0.4 milliGRAM(s) Oral at bedtime  tiotropium 2.5 MICROgram(s) Inhaler 2 Puff(s) Inhalation daily    MEDICATIONS  (PRN):  acetaminophen     Tablet .. 650 milliGRAM(s) Oral every 6 hours PRN Temp greater or equal to 38C (100.4F), Mild Pain (1 - 3)      PHYSICAL EXAM:  GENERAL: Alert, awake and oriented x2-3  HEENT: MAGALYS, EOMI, neck supple, no JVP  CHEST/LUNG: Bilateral decreased breath sounds, bibasilar dry crackles  HEART: Regular rate and rhythm, CHANTAL II/VI at LPSB, no gallops, no rub   ABDOMEN: Soft, nontender, non distended, bowel sounds present, no palpable organomegaly  : No flank or supra pubic tenderness.  EXTREMITIES: Peripheral pulses are palpable, no pedal edema  Neurology: AAOx2-3, no focal neurological deficit  SKIN: No rash or skin lesion  Musculoskeletal: No joint deformity or spinal tenderness.      Vascular ACCESS:     LABS:                        7.4    5.45  )-----------( 175      ( 30 Apr 2023 04:59 )             22.3     04-30    137  |  109<H>  |  62<H>  ----------------------------<  114<H>  5.0   |  20<L>  |  3.96<H>    Ca    8.0<L>      30 Apr 2023 04:59  Phos  4.1     04-30  Mg     1.6     04-30    TPro  6.6  /  Alb  2.5<L>  /  TBili  0.3  /  DBili  x   /  AST  18  /  ALT  21  /  AlkPhos  50  04-30                RADIOLOGY & ADDITIONAL STUDIES:  rad< from: CT Abdomen and Pelvis No Cont (04.26.23 @ 22:33) >    ACC: [id number] EXAM:  CT ABDOMEN AND PELVIS   ORDERED BY: MICHAEL JEFFERSON     PROCEDURE DATE:  04/26/2023          INTERPRETATION:  CLINICAL INFORMATION: Abdominal pain and diarrhea.    COMPARISON: CT torso 4/23/2023.    PROCEDURE:  CT of the Abdomenand Pelvis was performed without intravenous contrast.  Intravenous contrast: None.  Oral contrast: None.  Sagittal and coronal reformats were performed.    FINDINGS: Absence of intravenous contrast limits evaluation of   vasculature and solid organs.    LOWER CHEST: Redemonstrated pneumomediastinum extending to retrocrural   spaces. Continued findings of diffuse lung fibrosis with   peripheral/basilar reticular opacities and honeycombing.    LIVER: Within normal limits.  BILE DUCTS: Normal caliber.  GALLBLADDER: Within normal limits.  SPLEEN: Within normal limits.  PANCREAS: Within normal limits.  ADRENALS: Within normal limits.  KIDNEYS/URETERS: Mild fullness of bilateral renal pelvis without   calcified stone identified along the course the ureter.    BLADDER: Wall thickening, difficult to assess secondary compression   containing Lomax catheter.  REPRODUCTIVE ORGANS: Prominent prostate.    BOWEL: No acute bowel inflammatory changes or obstruction. Nonvisualized   appendix. Diverticulosis coli. Inadequately distended stomach, limiting   detailed evaluation.  PERITONEUM: No ascites.  VESSELS:  Atherosclerotic changes.  RETROPERITONEUM: No lymphadenopathy.  ABDOMINAL WALL: Small bilateral groin hernias containing fluid.  BONES: Degenerative changes.    IMPRESSION:    No acute bowel inflammatory change or obstruction.    Mild fullness of bilateral renal pelvis without calcified stone   identified along the course the ureter.    Bladder wall thickening, difficult to assess secondary compression   containing Lomax catheter. Correlate with urinalysis and lab values to   assess for cystitis and/or ascending urinary tract infection.    Redemonstrated pneumomediastinum.    Continued findings of diffuse lung fibrosis with peripheral/basilar   reticular opacities and honeycombing.    --- End of Report ---            ADDY CALERO MD; Attending Radiologist  This document has been electronically signed. Apr 26 2023 11:51PM    < end of copied text >    Imaging Personally Reviewed:  [x] YES  [ ] NO    Consultant(s) Notes Reviewed:  [x] YES  [ ] NO    Care Discussed with Primary team/ Other Providers [x] YES  [ ] NO

## 2023-04-30 NOTE — PROGRESS NOTE ADULT - ASSESSMENT
87 year old M from home with no known PMH presents with urinary retention. Patient reports since being discharged he has had very little urine output. Denies any blood in urine or pain. Denies fever, chills, cp, sob, palpitations, lightheadedness, dizziness, n/v/d, abdominal pain, constipation.  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  5) Anemia of chronic kidney disease  6) Renal osteodystrophy  7) NAGMA      Recommend:  Strict I/o  Avoid Nephrotoxic agents  S cr downtrending to 3.9 with non oliguria  Continue S/p Lomax's catheter  Monitor BMP and electrolytes daily  Continue Flomax and finasteride  CT abdomen and pelvis without contrast results reviewed  Urology/pulmonary consult reviewed  Able to tolerate po intake  Anemia panel/CKD work up reviewed  Continue Sodium bicarbonate 650 mg po tid with meals with goal serum bicarbonate>22  Continue po calcitriol 0.25 mcg po TIW  Iron studies/Hgb/Hct reviewed  Ferrous sulfate/EPO as per ordered  PSA within normal limits  Volume status and electrolytes noted  No urgent need for RRT/HD  GOC/ Advance directives per patient/family  Discharge planning per primary [place]  Out patient renal follow up on discharge  Will follow

## 2023-05-01 DIAGNOSIS — R00.0 TACHYCARDIA, UNSPECIFIED: ICD-10-CM

## 2023-05-01 LAB
ALBUMIN SERPL ELPH-MCNC: 2.6 G/DL — LOW (ref 3.5–5)
ALP SERPL-CCNC: 51 U/L — SIGNIFICANT CHANGE UP (ref 40–120)
ALT FLD-CCNC: 18 U/L DA — SIGNIFICANT CHANGE UP (ref 10–60)
ANION GAP SERPL CALC-SCNC: 8 MMOL/L — SIGNIFICANT CHANGE UP (ref 5–17)
AST SERPL-CCNC: 17 U/L — SIGNIFICANT CHANGE UP (ref 10–40)
BILIRUB SERPL-MCNC: 0.3 MG/DL — SIGNIFICANT CHANGE UP (ref 0.2–1.2)
BUN SERPL-MCNC: 55 MG/DL — HIGH (ref 7–18)
CALCIUM SERPL-MCNC: 8.2 MG/DL — LOW (ref 8.4–10.5)
CHLORIDE SERPL-SCNC: 107 MMOL/L — SIGNIFICANT CHANGE UP (ref 96–108)
CO2 SERPL-SCNC: 21 MMOL/L — LOW (ref 22–31)
CREAT SERPL-MCNC: 3.87 MG/DL — HIGH (ref 0.5–1.3)
EGFR: 15 ML/MIN/1.73M2 — LOW
GLUCOSE SERPL-MCNC: 99 MG/DL — SIGNIFICANT CHANGE UP (ref 70–99)
HCT VFR BLD CALC: 21.7 % — LOW (ref 39–50)
HCT VFR BLD CALC: 21.9 % — LOW (ref 39–50)
HGB BLD-MCNC: 7.2 G/DL — LOW (ref 13–17)
HGB BLD-MCNC: 7.2 G/DL — LOW (ref 13–17)
MAGNESIUM SERPL-MCNC: 1.7 MG/DL — SIGNIFICANT CHANGE UP (ref 1.6–2.6)
MCHC RBC-ENTMCNC: 31.2 PG — SIGNIFICANT CHANGE UP (ref 27–34)
MCHC RBC-ENTMCNC: 31.6 PG — SIGNIFICANT CHANGE UP (ref 27–34)
MCHC RBC-ENTMCNC: 32.9 GM/DL — SIGNIFICANT CHANGE UP (ref 32–36)
MCHC RBC-ENTMCNC: 33.2 GM/DL — SIGNIFICANT CHANGE UP (ref 32–36)
MCV RBC AUTO: 93.9 FL — SIGNIFICANT CHANGE UP (ref 80–100)
MCV RBC AUTO: 96.1 FL — SIGNIFICANT CHANGE UP (ref 80–100)
NRBC # BLD: 0 /100 WBCS — SIGNIFICANT CHANGE UP (ref 0–0)
NRBC # BLD: 0 /100 WBCS — SIGNIFICANT CHANGE UP (ref 0–0)
PHOSPHATE SERPL-MCNC: 3.8 MG/DL — SIGNIFICANT CHANGE UP (ref 2.5–4.5)
PLATELET # BLD AUTO: 186 K/UL — SIGNIFICANT CHANGE UP (ref 150–400)
PLATELET # BLD AUTO: 190 K/UL — SIGNIFICANT CHANGE UP (ref 150–400)
POTASSIUM SERPL-MCNC: 5 MMOL/L — SIGNIFICANT CHANGE UP (ref 3.5–5.3)
POTASSIUM SERPL-SCNC: 5 MMOL/L — SIGNIFICANT CHANGE UP (ref 3.5–5.3)
PROT SERPL-MCNC: 6.7 G/DL — SIGNIFICANT CHANGE UP (ref 6–8.3)
RBC # BLD: 2.28 M/UL — LOW (ref 4.2–5.8)
RBC # BLD: 2.31 M/UL — LOW (ref 4.2–5.8)
RBC # FLD: 13.9 % — SIGNIFICANT CHANGE UP (ref 10.3–14.5)
RBC # FLD: 13.9 % — SIGNIFICANT CHANGE UP (ref 10.3–14.5)
SODIUM SERPL-SCNC: 136 MMOL/L — SIGNIFICANT CHANGE UP (ref 135–145)
WBC # BLD: 7.28 K/UL — SIGNIFICANT CHANGE UP (ref 3.8–10.5)
WBC # BLD: 8.4 K/UL — SIGNIFICANT CHANGE UP (ref 3.8–10.5)
WBC # FLD AUTO: 7.28 K/UL — SIGNIFICANT CHANGE UP (ref 3.8–10.5)
WBC # FLD AUTO: 8.4 K/UL — SIGNIFICANT CHANGE UP (ref 3.8–10.5)

## 2023-05-01 PROCEDURE — 99232 SBSQ HOSP IP/OBS MODERATE 35: CPT

## 2023-05-01 PROCEDURE — 99233 SBSQ HOSP IP/OBS HIGH 50: CPT

## 2023-05-01 RX ORDER — LEVALBUTEROL 1.25 MG/.5ML
0.63 SOLUTION, CONCENTRATE RESPIRATORY (INHALATION) EVERY 6 HOURS
Refills: 0 | Status: DISCONTINUED | OUTPATIENT
Start: 2023-05-01 | End: 2023-05-04

## 2023-05-01 RX ORDER — ACETAMINOPHEN 500 MG
750 TABLET ORAL ONCE
Refills: 0 | Status: COMPLETED | OUTPATIENT
Start: 2023-05-01 | End: 2023-05-01

## 2023-05-01 RX ORDER — METOPROLOL TARTRATE 50 MG
6.25 TABLET ORAL
Refills: 0 | Status: DISCONTINUED | OUTPATIENT
Start: 2023-05-01 | End: 2023-05-01

## 2023-05-01 RX ORDER — LEVALBUTEROL 1.25 MG/.5ML
0.63 SOLUTION, CONCENTRATE RESPIRATORY (INHALATION)
Refills: 0 | Status: DISCONTINUED | OUTPATIENT
Start: 2023-05-01 | End: 2023-05-01

## 2023-05-01 RX ORDER — IPRATROPIUM/ALBUTEROL SULFATE 18-103MCG
3 AEROSOL WITH ADAPTER (GRAM) INHALATION EVERY 6 HOURS
Refills: 0 | Status: DISCONTINUED | OUTPATIENT
Start: 2023-05-01 | End: 2023-05-01

## 2023-05-01 RX ORDER — METOPROLOL TARTRATE 50 MG
12.5 TABLET ORAL
Refills: 0 | Status: DISCONTINUED | OUTPATIENT
Start: 2023-05-01 | End: 2023-05-08

## 2023-05-01 RX ORDER — SODIUM CHLORIDE 9 MG/ML
1000 INJECTION, SOLUTION INTRAVENOUS
Refills: 0 | Status: DISCONTINUED | OUTPATIENT
Start: 2023-05-01 | End: 2023-05-03

## 2023-05-01 RX ADMIN — CALCITRIOL 0.25 MICROGRAM(S): 0.5 CAPSULE ORAL at 12:25

## 2023-05-01 RX ADMIN — LEVALBUTEROL 0.63 MILLIGRAM(S): 1.25 SOLUTION, CONCENTRATE RESPIRATORY (INHALATION) at 20:09

## 2023-05-01 RX ADMIN — Medication 325 MILLIGRAM(S): at 12:26

## 2023-05-01 RX ADMIN — CHLORHEXIDINE GLUCONATE 1 APPLICATION(S): 213 SOLUTION TOPICAL at 05:02

## 2023-05-01 RX ADMIN — Medication 12.5 MILLIGRAM(S): at 12:26

## 2023-05-01 RX ADMIN — Medication 750 MILLIGRAM(S): at 23:57

## 2023-05-01 RX ADMIN — LEVALBUTEROL 0.63 MILLIGRAM(S): 1.25 SOLUTION, CONCENTRATE RESPIRATORY (INHALATION) at 14:30

## 2023-05-01 RX ADMIN — Medication 650 MILLIGRAM(S): at 15:55

## 2023-05-01 RX ADMIN — HEPARIN SODIUM 5000 UNIT(S): 5000 INJECTION INTRAVENOUS; SUBCUTANEOUS at 05:02

## 2023-05-01 RX ADMIN — SODIUM CHLORIDE 60 MILLILITER(S): 9 INJECTION, SOLUTION INTRAVENOUS at 06:26

## 2023-05-01 RX ADMIN — BUDESONIDE AND FORMOTEROL FUMARATE DIHYDRATE 2 PUFF(S): 160; 4.5 AEROSOL RESPIRATORY (INHALATION) at 21:18

## 2023-05-01 RX ADMIN — FINASTERIDE 5 MILLIGRAM(S): 5 TABLET, FILM COATED ORAL at 12:25

## 2023-05-01 RX ADMIN — Medication 12.5 MILLIGRAM(S): at 17:50

## 2023-05-01 RX ADMIN — Medication 300 MILLIGRAM(S): at 23:37

## 2023-05-01 RX ADMIN — Medication 3 MILLILITER(S): at 01:56

## 2023-05-01 RX ADMIN — Medication 3 MILLILITER(S): at 10:39

## 2023-05-01 RX ADMIN — Medication 650 MILLIGRAM(S): at 05:01

## 2023-05-01 RX ADMIN — SODIUM CHLORIDE 60 MILLILITER(S): 9 INJECTION, SOLUTION INTRAVENOUS at 21:22

## 2023-05-01 NOTE — PROGRESS NOTE ADULT - ASSESSMENT
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 Left AMA from ICU then returned to hospital 4/27 with urinary retention and ongoing renal failure for which he was re-admitted. Asked for mgmt recs in ILD/IPF.    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 Spiriva (son says pt has already at home) - would rx for discharge; if on standing duoneb inpatient then hold LAMA to avoid excessive muscarinic antagonist  - discussed risks/benefits again re: antifibrotic tx Esbriet vs. Ofev; side effect profile would make it difficult for pt to tolerate especially with poor BMI and frailty, also drugs best for forestalling early disease from progression - pt wants to hold off for now  - continue mobilizing OOBTC daily and encouraging ambulation with PT to avoid atelectasis and deconditioning  - no acute intervention needed for pneumomediastinum and appr thoracic eval earlier in the week; PTX appears to be resolving as anticipated    Cesar Chery, DO  Pulmonary & Critical Care Medicine  Available on Teams

## 2023-05-01 NOTE — PROGRESS NOTE ADULT - PROBLEM SELECTOR PLAN 4
pt with sinus tachy to 140s on tele  likely 2/2 to albuterol/duonebs  start levalbuterol  started metoprolol 12.5mg BID

## 2023-05-01 NOTE — DISCHARGE NOTE PROVIDER - PROVIDER TOKENS
PROVIDER:[TOKEN:[[id number]],FOLLOWUP:[1 week]],PROVIDER:[TOKEN:[[id number]],FOLLOWUP:[1 week]],PROVIDER:[TOKEN:[[id number]],FOLLOWUP:[1 week]]

## 2023-05-01 NOTE — PROGRESS NOTE ADULT - SUBJECTIVE AND OBJECTIVE BOX
PULMONARY CONSULT SERVICE FOLLOW-UP NOTE    INTERVAL HPI:  Reviewed chart and overnight events; patient seen and examined at bedside. Only complaint this morning is that he wants to go home today. Denies dyspnea, VALENCIA, cough, CP or pleurisy.     MEDICATIONS:  Pulmonary:  budesonide  80 MICROgram(s)/formoterol 4.5 MICROgram(s) Inhaler 2 Puff(s) Inhalation two times a day  levalbuterol Inhalation 0.63 milliGRAM(s) Inhalation <User Schedule>    Antimicrobials:    Anticoagulants:  heparin   Injectable 5000 Unit(s) SubCutaneous every 12 hours    Cardiac:  metoprolol tartrate 12.5 milliGRAM(s) Oral two times a day    Allergies    No Known Allergies    Intolerances    Vital Signs Last 24 Hrs  T(C): 36.4 (01 May 2023 11:09), Max: 36.8 (30 Apr 2023 15:35)  T(F): 97.6 (01 May 2023 11:09), Max: 98.2 (30 Apr 2023 15:35)  HR: 98 (01 May 2023 11:09) (97 - 110)  BP: 119/68 (01 May 2023 11:09) (103/56 - 132/61)  BP(mean): 73 (30 Apr 2023 20:18) (70 - 73)  RR: 18 (01 May 2023 11:09) (18 - 20)  SpO2: 98% (01 May 2023 11:09) (98% - 100%)    Parameters below as of 01 May 2023 11:09  Patient On (Oxygen Delivery Method): room air    04-30 @ 07:01  -  05-01 @ 07:00  --------------------------------------------------------  IN: 0 mL / OUT: 1000 mL / NET: -1000 mL    PHYSICAL EXAM:  Constitutional: frail and elderly man resting semirecumbent in bed; NAD  HEENT: NC/AT; PERRL, anicteric sclera; MMM  Neck: supple  Cardiovascular: +S1/S2, RRR  Respiratory: few velcro-like crackles in mid posterior fields, globally diminished BS otherwise with good air entry; respirations overall non-labored on ambient air  Gastrointestinal: soft, NT/ND  Extremities: WWP; no edema, clubbing or cyanosis  Vascular: 2+ radial pulses B/L  Neurological: awake and alert; ROMAN    LABS:  CBC Full  -  ( 01 May 2023 06:52 )  WBC Count : 7.28 K/uL  RBC Count : 2.31 M/uL  Hemoglobin : 7.2 g/dL  Hematocrit : 21.7 %  Platelet Count - Automated : 190 K/uL  Mean Cell Volume : 93.9 fl  Mean Cell Hemoglobin : 31.2 pg  Mean Cell Hemoglobin Concentration : 33.2 gm/dL  Auto Neutrophil # : x  Auto Lymphocyte # : x  Auto Monocyte # : x  Auto Eosinophil # : x  Auto Basophil # : x  Auto Neutrophil % : x  Auto Lymphocyte % : x  Auto Monocyte % : x  Auto Eosinophil % : x  Auto Basophil % : x    05-01    136  |  107  |  55<H>  ----------------------------<  99  5.0   |  21<L>  |  3.87<H>    Ca    8.2<L>      01 May 2023 06:52  Phos  3.8     05-01  Mg     1.7     05-01    TPro  6.7  /  Alb  2.6<L>  /  TBili  0.3  /  DBili  x   /  AST  17  /  ALT  18  /  AlkPhos  51  05-01    RADIOLOGY & ADDITIONAL STUDIES:  No interval chest study for review

## 2023-05-01 NOTE — PROGRESS NOTE ADULT - SUBJECTIVE AND OBJECTIVE BOX
Rafael Bradshaw MD (Nephrology progress note)  [address], [facility],  [address],  [facility]  TEl: [phone]  Cell: [phone]    Patient is a 87y Male seen and evaluated at bedside. Vital signs, laboratory data, imaging studies, consult notes reviewed done within past 24 hours. Overnight events noted.    Allergies    No Known Allergies    Intolerances        ROS:  CONSTITUTIONAL: No fever or chills.  HEENT: No headcahe or visual disturbances.  RESPIRATORY: No shortness of breath, cough, hemoptysis or wheezing  CARDIOVASCULAR: No Chest pain, shortness of breath, palpitations, dizziness, syncope, orthopnea, PND or leg swelling.  GASTROINTESTINAL: No abdominal pain, nausea, vomiting, diarrhea, hematemesis or blood per rectum.  GENITOURINARY: No urinary frequency, urgency, gross hematuria, dysuria, flank or supra pubic pain, difficulty passing urine.  NEUROLOGICAL: No headache, focal limb weakness, tingling or numbness or seizure like activity  SKIN: No skin rash or lesion  MUSCULOSKELETAL: No leg pain, calf pain or swelling    VITALS:    T(C): 36.4 (05-01-23 @ 07:25), Max: 36.8 (04-30-23 @ 11:15)  HR: 105 (05-01-23 @ 07:25) (97 - 110)  BP: 132/61 (05-01-23 @ 07:25) (103/56 - 132/61)  RR: 20 (05-01-23 @ 07:25) (18 - 20)  SpO2: 99% (05-01-23 @ 07:25) (98% - 100%)  CAPILLARY BLOOD GLUCOSE          04-30-23 @ 07:01  -  05-01-23 @ 07:00  --------------------------------------------------------  IN: 0 mL / OUT: 1000 mL / NET: -1000 mL      MEDICATIONS  (STANDING):  budesonide  80 MICROgram(s)/formoterol 4.5 MICROgram(s) Inhaler 2 Puff(s) Inhalation two times a day  calcitriol   Capsule 0.25 MICROGram(s) Oral <User Schedule>  chlorhexidine 2% Cloths 1 Application(s) Topical <User Schedule>  dextrose 5% + sodium chloride 0.45%. 1000 milliLiter(s) (60 mL/Hr) IV Continuous <Continuous>  ferrous    sulfate 325 milliGRAM(s) Oral daily  finasteride 5 milliGRAM(s) Oral daily  heparin   Injectable 5000 Unit(s) SubCutaneous every 12 hours  melatonin 5 milliGRAM(s) Oral at bedtime  metoprolol tartrate 12.5 milliGRAM(s) Oral two times a day  sodium bicarbonate 650 milliGRAM(s) Oral three times a day  tamsulosin 0.4 milliGRAM(s) Oral at bedtime    MEDICATIONS  (PRN):  acetaminophen     Tablet .. 650 milliGRAM(s) Oral every 6 hours PRN Temp greater or equal to 38C (100.4F), Mild Pain (1 - 3)      PHYSICAL EXAM:  GENERAL: Alert, awake and oriented x3 in no distress  HEENT: MAGALYS, EOMI, neck supple, no JVP, no carotid bruit, oral mucosa moist and pink.  CHEST/LUNG: Bilateral clear breath sounds, no rales, no crepitations, no rhonchi, no wheezing  HEART: Regular rate and rhythm, CHANTAL II/VI at LPSB, no gallops, no rub   ABDOMEN: Soft, nontender, non distended, bowel sounds present, no palpable organomegaly  : No flank or supra pubic tenderness.  EXTREMITIES: Peripheral pulses are palpable, no pedal edema  Neurology: AAOx3, no focal neurological deficit  SKIN: No rash or skin lesion  Musculoskeletal: No joint deformity or spinal tenderness.      Vascular ACCESS:     LABS:                        7.2    7.28  )-----------( 190      ( 01 May 2023 06:52 )             21.7     05-01    136  |  107  |  55<H>  ----------------------------<  99  5.0   |  21<L>  |  3.87<H>    Ca    8.2<L>      01 May 2023 06:52  Phos  3.8     05-01  Mg     1.7     05-01    TPro  6.7  /  Alb  2.6<L>  /  TBili  0.3  /  DBili  x   /  AST  17  /  ALT  18  /  AlkPhos  51  05-01                RADIOLOGY & ADDITIONAL STUDIES:    Imaging Personally Reviewed:  [x] YES  [ ] NO    Consultant(s) Notes Reviewed:  [x] YES  [ ] NO    Care Discussed with Primary team/ Other Providers [x] YES  [ ] NO       Rafael Bradshaw MD (Nephrology progress note)  [address], [facility],  [address],  [facility]  TEl: [phone]  Cell: [phone]    Patient is a 87y Male seen and evaluated at bedside. Vital signs, laboratory data, imaging studies, consult notes reviewed done within past 24 hours. Overnight events noted. Patient lying in bed alert and awake in no distress feels better. Interval S cr 3.8 with non oliguria. Lomax's catheter in place. Hgb 7.2 with no active bleeding    Allergies    No Known Allergies    Intolerances        ROS:  CONSTITUTIONAL: No fever or chills.  HEENT: No headache or visual disturbances.  RESPIRATORY: No shortness of breath, cough, hemoptysis or wheezing  CARDIOVASCULAR: No Chest pain or SOB  GASTROINTESTINAL: No abdominal pain, nausea, vomiting, diarrhea, hematemesis or blood per rectum.  GENITOURINARY: No flank or supra pubic pain  NEUROLOGICAL: No headache, focal limb weakness, tingling or numbness  SKIN: No skin rash or lesion  MUSCULOSKELETAL: No leg pain, calf pain or swelling    VITALS:    T(C): 36.4 (05-01-23 @ 07:25), Max: 36.8 (04-30-23 @ 11:15)  HR: 105 (05-01-23 @ 07:25) (97 - 110)  BP: 132/61 (05-01-23 @ 07:25) (103/56 - 132/61)  RR: 20 (05-01-23 @ 07:25) (18 - 20)  SpO2: 99% (05-01-23 @ 07:25) (98% - 100%)  CAPILLARY BLOOD GLUCOSE          04-30-23 @ 07:01  -  05-01-23 @ 07:00  --------------------------------------------------------  IN: 0 mL / OUT: 1000 mL / NET: -1000 mL      MEDICATIONS  (STANDING):  budesonide  80 MICROgram(s)/formoterol 4.5 MICROgram(s) Inhaler 2 Puff(s) Inhalation two times a day  calcitriol   Capsule 0.25 MICROGram(s) Oral <User Schedule>  chlorhexidine 2% Cloths 1 Application(s) Topical <User Schedule>  dextrose 5% + sodium chloride 0.45%. 1000 milliLiter(s) (60 mL/Hr) IV Continuous <Continuous>  ferrous    sulfate 325 milliGRAM(s) Oral daily  finasteride 5 milliGRAM(s) Oral daily  heparin   Injectable 5000 Unit(s) SubCutaneous every 12 hours  melatonin 5 milliGRAM(s) Oral at bedtime  metoprolol tartrate 12.5 milliGRAM(s) Oral two times a day  sodium bicarbonate 650 milliGRAM(s) Oral three times a day  tamsulosin 0.4 milliGRAM(s) Oral at bedtime    MEDICATIONS  (PRN):  acetaminophen     Tablet .. 650 milliGRAM(s) Oral every 6 hours PRN Temp greater or equal to 38C (100.4F), Mild Pain (1 - 3)      PHYSICAL EXAM:  GENERAL: Alert, awake and oriented x2-3 in no distress  HEENT: MAGALYS, EOMI, neck supple, no JVP  CHEST/LUNG: Bilateral clear breath sounds, no rales, no crepitations, no rhonchi, no wheezing  HEART: Regular rate and rhythm, CHANTAL II/VI at LPSB, no gallops, no rub   ABDOMEN: Soft, nontender, non distended, bowel sounds present, no palpable organomegaly  : No flank or supra pubic tenderness.  EXTREMITIES: Peripheral pulses are palpable, no pedal edema  Neurology: AAOx3, no focal neurological deficit  SKIN: No rash or skin lesion  Musculoskeletal: No joint deformity or spinal tenderness.      Vascular ACCESS: None    LABS:                        7.2    7.28  )-----------( 190      ( 01 May 2023 06:52 )             21.7     05-01    136  |  107  |  55<H>  ----------------------------<  99  5.0   |  21<L>  |  3.87<H>    Ca    8.2<L>      01 May 2023 06:52  Phos  3.8     05-01  Mg     1.7     05-01    TPro  6.7  /  Alb  2.6<L>  /  TBili  0.3  /  DBili  x   /  AST  17  /  ALT  18  /  AlkPhos  51  05-01                RADIOLOGY & ADDITIONAL STUDIES:  rad  Imaging Personally Reviewed:  [x] YES  [ ] NO    Consultant(s) Notes Reviewed:  [x] YES  [ ] NO    Care Discussed with Primary team/ Other Providers [x] YES  [ ] NO

## 2023-05-01 NOTE — PROGRESS NOTE ADULT - ASSESSMENT
87 year old M from home with PMH of pulmonary fibrosis, pertinent recent admission to [facility] ICU (4/23-4/26) for close monitoring. CT show pulmonary fibrosis with pneumomediastinum and small right apical PTX . He was initially started on BiPaP changed to NC. NAGMA due to SANDI. Pt left AMA. Pt presents with urinary retention and acute hyperkalemia admitted to telemetry for management of hyperkalemia and SANDI.

## 2023-05-01 NOTE — DISCHARGE NOTE PROVIDER - CARE PROVIDER_API CALL
Rafael Bradshaw)  Internal Medicine  [address] [facility], [place], [address]  [place] [address]  Phone: [phone]  Fax: [phone]  Follow Up Time: 1 week    Yordy Hicks)  Urology  [address] [place] [address]  Phone: [phone]  Fax: [phone]  Follow Up Time: 1 week    Cesar Chery (DO)  Critical Care Medicine; Internal Medicine; Pulmonary Disease  [address], [address]  [place] [address]  Phone: [phone]  Fax: [phone]  Follow Up Time: 1 week

## 2023-05-01 NOTE — DISCHARGE NOTE PROVIDER - HOSPITAL COURSE
87 year old M from home with no known PMH presents with urinary retention. Patient reports since being discharged he has had very little urine output. Denies any blood in urine or pain. Denies fever, chills, cp, sob, palpitations, lightheadedness, dizziness, n/v/d, abdominal pain, constipation. Reports significant weight loss and poor oral intake as he does not have teeth and his appetite is deteriorating, reports one episode of loose stool on Wednesday AM.    Of note, pt was admitted to [facility] ICU 4/23 and left AMA 4/24 prior to completion of trial of void. Was admitted for acute hypoxic respiratory failure 2/2 pulmonary fibrosis, CT shows diffuse lung fibrosis, pneumomediastinum, mild fullness of bilateral renal pelvis without stones, bladder wall thickening    ED Course: Vitals: 152/82 P 105 R 18 T 97.5 F SpO2 100% RA  Meds: 500 cc NS, D50, insulin 2.5 U, lokelma 10 mg  EKG: NSR with peaked t waves    Pt admitted to telemetry for hyperkalemia with EKG changes and SANDI 2/2 urinary retention. presents with elevated Cr to 5.3, unclear baseline however recently elevated up to 6.8 and improving since prior admission. associated with urinary retention, likely 2/2 to prostate enlargement. Started on Flomax and finasteride. bajwa placed by urology. CT shows renal pelvis fullness of bilateral renal pelvis without calcified stone identified along the course the ureter. Bladder wall thickening. Prostate Enlargement. maintain bajwa on discharge with outpatient urology follow up. SCr improving daily. Dr. Augustine Sanchez following. pt with NAGMA, similar to previous admission. HCO3 16 likely due to acute renal failure. c/w sodium bicarbonate TID and calcitriol. Nephro following. CT shows redemonstrated pneumomediastinum, continued findings of diffuse lung fibrosis with peripheral/basilar reticular opacities and honeycombing. CT Surgery recommended no acute intervention last admission. Was noted to be on Azithro/Rocephin last admission for suspected PNA- pt has no clinical of signs of pneumonia, will hold off Abx for now. c/w Spiriva, Symbicort. Started on Levalbuterol due to persistent sinus tachycardia noted on tele. saturating well on room air. PulmDr. Chery following. dvt ppx SQ heparin. PT recommends JOSE GUADALUPE.    Patient is medically stable and ready for discharge to subacute rehab.   This is only a brief summary. Please refer to the chart for the full hospital course.     87 year old M from home with no known PMH presents with urinary retention. Patient reports since being discharged he has had very little urine output. Denies any blood in urine or pain. Denies fever, chills, cp, sob, palpitations, lightheadedness, dizziness, n/v/d, abdominal pain, constipation. Reports significant weight loss and poor oral intake as he does not have teeth and his appetite is deteriorating, reports one episode of loose stool on Wednesday AM.    Of note, pt was admitted to [facility] ICU 4/23 and left AMA 4/24 prior to completion of trial of void. Was admitted for acute hypoxic respiratory failure 2/2 pulmonary fibrosis, CT shows diffuse lung fibrosis, pneumomediastinum, mild fullness of bilateral renal pelvis without stones, bladder wall thickening    ED Course: Vitals: 152/82 P 105 R 18 T 97.5 F SpO2 100% RA  Meds: 500 cc NS, D50, insulin 2.5 U, lokelma 10 mg  EKG: NSR with peaked t waves    Pt admitted to telemetry for hyperkalemia with EKG changes and SANDI 2/2 urinary retention. presents with elevated Cr to 5.3, unclear baseline however recently elevated up to 6.8 and improving since prior admission. associated with urinary retention, likely 2/2 to prostate enlargement. Started on Flomax and finasteride. bajwa placed by urology. CT shows renal pelvis fullness of bilateral renal pelvis without calcified stone identified along the course the ureter. Bladder wall thickening. Prostate Enlargement. maintain bajwa on discharge with outpatient urology follow up. SCr improving daily. Nephro, Dr. Bradshaw following. Pt had several lab findings of hyperkalemia, given insulin D50 and lokelma. Pt will be DCed on Lokelma for 3 days, repeat labs in 1 week. pt with NAGMA, similar to previous admission. HCO3 16 likely due to acute renal failure. c/w sodium bicarbonate TID and calcitriol. Nephro following. CT shows redemonstrated pneumomediastinum, continued findings of diffuse lung fibrosis with peripheral/basilar reticular opacities and honeycombing. CT Surgery recommended no acute intervention last admission. Was noted to be on Azithro/Rocephin last admission for suspected PNA- pt has no clinical of signs of pneumonia, will hold off Abx for now. DCed Symbicort and Levalbuterol due to persistent sinus tachycardia noted on tele. c/w Spiriva. saturating well on room air. Dr. Miri Mcclure following. dvt ppx SQ heparin. PT recommends JOSE GUADALUPE.    Patient is medically stable and ready for discharge to subacute rehab.   This is only a brief summary. Please refer to the chart for the full hospital course.     87 year old M from home with no known PMH presents with urinary retention. Of note, pt was admitted to [facility] ICU 4/23 and left AMA 4/24 prior to completion of trial of void.  In the ED was noted to have hyperkalemia, and EKG showed NSR with peaked t waves. Admitted to telemetry for hyperkalemia with EKG changes and SANDI 2/2 urinary retention,   started on Flomax and finasteride, s/p bajwa placed by urology. CT shows renal pelvis fullness of bilateral renal pelvis without calcified stone identified along the course the ureter.   Bladder wall thickening. Prostate Enlargement, will need to maintain bajwa on discharge with outpatient urology follow up. SCr improving daily. NephroDr. Bradshaw following.   Noted to have NAGMA, similar to previous admission. HCO3 16 likely due to acute renal failure. c/w sodium bicarbonate TID and calcitriol. Nephro following. CT shows redemonstrated pneumomediastinum, continued findings of diffuse lung fibrosis with peripheral/basilar reticular opacities and honeycombing. CT Surgery recommended no acute intervention last admission. Was noted to be on Azithro/Rocephin last admission for   suspected PNA- pt has no clinical of signs of pneumonia, monitored off anitbiotics. DC'ed Symbicort and Levalbuterol due to persistent sinus tachycardia noted on tele. c/w Spiriva.   saturating well on room air. PulDr. Miri clarke following. Hospital course c/b worsening scR patient, patient is refusing IVF, no urgent need for HD at this time per Nephro. PT recommends JOSE GUADALUPE.      Patient is medically stable and ready for discharge to subacute rehab.   Discharge discussed with attending.  This is only a brief summary. Please refer to the chart for the full hospital course.

## 2023-05-01 NOTE — DISCHARGE NOTE PROVIDER - NSDCMRMEDTOKEN_GEN_ALL_CORE_FT
budesonide-formoterol 80 mcg-4.5 mcg/inh inhalation aerosol: 2 puff(s) inhaled 2 times a day  FeroSul 325 mg (65 mg elemental iron) oral tablet: 1 tab(s) orally once a day  finasteride 5 mg oral tablet: 1 tab(s) orally once a day  finasteride 5 mg oral tablet: 1 tab(s) orally once a day  Flomax 0.4 mg oral capsule: 1 cap(s) orally once a day (at bedtime)  Metoprolol Tartrate 25 mg oral tablet: 0.5 tab(s) orally 2 times a day  Rocaltrol 0.25 mcg oral capsule: 1 cap(s) orally Monday, Wednesday, and Friday  sodium bicarbonate 650 mg oral tablet: 1 tab(s) orally 3 times a day  Xopenex 0.63 mg/3 mL inhalation solution: 3 milliliter(s) inhaled every 6 hours   FeroSul 325 mg (65 mg elemental iron) oral tablet: 1 tab(s) orally once a day  finasteride 5 mg oral tablet: 1 tab(s) orally once a day  finasteride 5 mg oral tablet: 1 tab(s) orally once a day  Flomax 0.4 mg oral capsule: 1 cap(s) orally once a day (at bedtime)  Metoprolol Tartrate 25 mg oral tablet: 0.5 tab(s) orally 2 times a day  Rocaltrol 0.25 mcg oral capsule: 1 cap(s) orally Monday, Wednesday, and Friday  tiotropium 2.5 mcg/inh inhalation aerosol: 2 puff(s) inhaled once a day   FeroSul 325 mg (65 mg elemental iron) oral tablet: 1 tab(s) orally once a day  finasteride 5 mg oral tablet: 1 tab(s) orally once a day  Flomax 0.4 mg oral capsule: 1 cap(s) orally once a day (at bedtime)  Metoprolol Tartrate 25 mg oral tablet: 0.5 tab(s) orally 2 times a day  mirtazapine 7.5 mg oral tablet: 1 tab(s) orally once a day (at bedtime)  Rocaltrol 0.25 mcg oral capsule: 1 cap(s) orally Monday, Wednesday, and Friday  tiotropium 2.5 mcg/inh inhalation aerosol: 2 puff(s) inhaled once a day   FeroSul 325 mg (65 mg elemental iron) oral tablet: 1 tab(s) orally once a day  finasteride 5 mg oral tablet: 1 tab(s) orally once a day  Flomax 0.4 mg oral capsule: 1 cap(s) orally once a day (at bedtime)  Metoprolol Tartrate 25 mg oral tablet: 1 tab(s) orally 2 times a day  mirtazapine 7.5 mg oral tablet: 1 tab(s) orally once a day (at bedtime)  Rocaltrol 0.25 mcg oral capsule: 1 cap(s) orally Monday, Wednesday, and Friday  tiotropium 2.5 mcg/inh inhalation aerosol: 2 puff(s) inhaled once a day   FeroSul 325 mg (65 mg elemental iron) oral tablet: 1 tab(s) orally once a day  finasteride 5 mg oral tablet: 1 tab(s) orally once a day  Flomax 0.4 mg oral capsule: 1 cap(s) orally once a day (at bedtime)  Metoprolol Tartrate 25 mg oral tablet: 1 tab(s) orally 2 times a day  mirtazapine 7.5 mg oral tablet: 1 tab(s) orally once a day (at bedtime)  Rocaltrol 0.25 mcg oral capsule: 1 cap(s) orally Monday, Wednesday, and Friday  sodium bicarbonate 650 mg oral tablet: 2 tab(s) orally 3 times a day

## 2023-05-01 NOTE — DISCHARGE NOTE PROVIDER - NSDCCPCAREPLAN_GEN_ALL_CORE_FT
PRINCIPAL DISCHARGE DIAGNOSIS  Diagnosis: SANDI (acute kidney injury)  Assessment and Plan of Treatment: You were found to have a worsening of your kidney function from your baseline, (serum creatinine was 5.3). Nephrology Dr. Bradshaw was consulted. Urology placed an indwelling bajwa catheter and your kidneys started to improve. We gave you gentle IV fluid hydration. We held nephrotoxic medications such as NSAID pain killers, and were cautious about the use of IV contrast if such scans were needed. You are advised to continue to stay well hydrated, and to seek medical attention if you have painful/burning urination, blood in urine, or increasing inability to control the flow of urine.  Meds: 500 cc NS, D50, insulin 2.5 U, lokelma 10 mg  EKG: NSR with peaked t waves      SECONDARY DISCHARGE DIAGNOSES  Diagnosis: Urinary retention  Assessment and Plan of Treatment: You have a history of urinary retention. CT shows renal pelvis fullness of bilateral renal pelvis without calcified stone identified along the course the ureter. Bladder wall thickening. Prostate Enlargement. We started you on Flomax and Proscar. We will keep the bajwa in place. Follow up with urologist, Dr. Hicks within 1 week of discharge.    Diagnosis: Normal anion gap metabolic acidosis  Assessment and Plan of Treatment: You had a low bicarbonate level and were found to have normal anion gap metabolic acidosis. We started you on sodium bicarbonate three times a day, per nephrology recommendations. Continue to take this medication as prescribed.   NAGMA, similar to previous admission. HCO3 16 likely due to acute renal failure. c/w sodium bicarbonate TID and calcitriol. Nephro following.    Diagnosis: Hyperkalemia  Assessment and Plan of Treatment: You presented with hyperkalemia and EKG showing peaked T waves. We gave you lokelma and insulin which helped bring the potassium to normal.    Diagnosis: Pulmonary fibrosis  Assessment and Plan of Treatment: CT shows redemonstrated pneumomediastinum, continued findings of diffuse lung fibrosis with peripheral/basilar reticular opacities and honeycombing. CT Surgery recommended no acute intervention last admission. Was noted to be on Azithro/Rocephin last admission for suspected PNA- pt has no clinical of signs of pneumonia, will hold off Abx for now. c/w Spiriva, Symbicort. Started on Levalbuterol due to persistent sinus tachycardia noted on tele. saturating well on room air. Pulm, Dr. Chery following. dvt ppx SQ heparin. PT recommends JO[facility] GUADALUPE.    Diagnosis: Pneumomediastinum  Assessment and Plan of Treatment:      PRINCIPAL DISCHARGE DIAGNOSIS  Diagnosis: SANDI (acute kidney injury)  Assessment and Plan of Treatment: You were found to have a worsening of your kidney function from your baseline, (serum creatinine was 5.3). Nephrology Dr. Bradshaw was consulted. Urology placed an indwelling bajwa catheter and your kidneys started to improve. We gave you gentle IV fluid hydration. We held nephrotoxic medications such as NSAID pain killers, and were cautious about the use of IV contrast if such scans were needed. You are advised to continue to stay well hydrated, and to seek medical attention if you have painful/burning urination, blood in urine, or increasing inability to control the flow of urine. Follow up with a nephrologist and urologist within 1-2 weeks of discharge. Physical therapy evaluated you and recommended subacute rehab.      SECONDARY DISCHARGE DIAGNOSES  Diagnosis: Urinary retention  Assessment and Plan of Treatment: You have a history of urinary retention. CT shows renal pelvis fullness of bilateral renal pelvis without calcified stone identified along the course the ureter. Bladder wall thickening. Prostate Enlargement. We started you on Flomax and Proscar. We will keep the bajwa in place. Follow up with urologist, Dr. Hicks within 1 week of discharge.    Diagnosis: Normal anion gap metabolic acidosis  Assessment and Plan of Treatment: You had a low bicarbonate level and were found to have normal anion gap metabolic acidosis. We started you on sodium bicarbonate three times a day, per nephrology recommendations. Continue to take this medication as prescribed.    Diagnosis: Sinus tachycardia  Assessment and Plan of Treatment: We monitored your heart on telemetry. You had sinus tachycardia. We prescribed you metoprolol 12.5 mg TWICE a day. Continue to take this medications. Monitor your heart rate and blood pressure at home. If your heart rate is below 60 beats per minute or if your blood pressure is less than 100/60, stop the medication and follow up with your health care provider.    Diagnosis: Hyperkalemia  Assessment and Plan of Treatment: You presented with hyperkalemia and EKG showing peaked T waves. We gave you lokelma, dextrose and insulin which helped bring the potassium to normal. Your potassium levels have returned to normal.    Diagnosis: Pulmonary fibrosis  Assessment and Plan of Treatment: You have a history of pulmonary fibrosis. Pulmonologist, Dr. Chery was consulted. You were saturating well on room air. Continue to take SPIRIVA, SYMBICORT and XOPENEX inhalers. CT chest showed redemonstrated pneumomediastinum, continued findings of diffuse lung fibrosis with peripheral/basilar reticular opacities and honeycombing. Thoracic Surgery recommended no acute intervention last admission. Follow up with pulmonologist for further management.    Diagnosis: Pneumomediastinum  Assessment and Plan of Treatment: Plan as above.     PRINCIPAL DISCHARGE DIAGNOSIS  Diagnosis: SANDI (acute kidney injury)  Assessment and Plan of Treatment: You were found to have a worsening of your kidney function from your baseline, (serum creatinine was 5.3). Nephrology Dr. Bradshaw was consulted. Urology placed an indwelling bajwa catheter and your kidneys started to improve. We gave you gentle IV fluid hydration. We held nephrotoxic medications such as NSAID pain killers, and were cautious about the use of IV contrast if such scans were needed. You are advised to continue to stay well hydrated, and to seek medical attention if you have painful/burning urination, blood in urine, or increasing inability to control the flow of urine. Follow up with a nephrologist and urologist within 1-2 weeks of discharge. Physical therapy evaluated you and recommended subacute rehab.      SECONDARY DISCHARGE DIAGNOSES  Diagnosis: Urinary retention  Assessment and Plan of Treatment: You have a history of urinary retention. CT shows renal pelvis fullness of bilateral renal pelvis without calcified stone identified along the course the ureter. Bladder wall thickening. Prostate Enlargement. We started you on Flomax and Proscar. We will keep the bajwa in place. Follow up with urologist, Dr. Hicks within 1 week of discharge.    Diagnosis: Normal anion gap metabolic acidosis  Assessment and Plan of Treatment: You had a low bicarbonate level and were found to have normal anion gap metabolic acidosis. We started you on sodium bicarbonate three times a day, per nephrology recommendations. Continue to take this medication as prescribed.    Diagnosis: Hyperkalemia  Assessment and Plan of Treatment: You presented with hyperkalemia and EKG showing peaked T waves. We gave you lokelma, dextrose and insulin which helped bring the potassium to normal. Your potassium levels have returned to normal.    Diagnosis: Pulmonary fibrosis  Assessment and Plan of Treatment: You have a history of pulmonary fibrosis. Pulmonologist, Dr. Chery was consulted. You were saturating well on room air. Continue to take SPIRIVA, SYMBICORT and XOPENEX inhalers. CT chest showed redemonstrated pneumomediastinum, continued findings of diffuse lung fibrosis with peripheral/basilar reticular opacities and honeycombing. Thoracic Surgery recommended no acute intervention last admission. Follow up with pulmonologist for further management.    Diagnosis: Pneumomediastinum  Assessment and Plan of Treatment: Plan as above.    Diagnosis: Sinus tachycardia  Assessment and Plan of Treatment: We monitored your heart on telemetry. You had sinus tachycardia. We prescribed you metoprolol 12.5 mg TWICE a day. Continue to take this medications. Monitor your heart rate and blood pressure at home. If your heart rate is below 60 beats per minute or if your blood pressure is less than 100/60, stop the medication and follow up with your health care provider.     PRINCIPAL DISCHARGE DIAGNOSIS  Diagnosis: SANDI (acute kidney injury)  Assessment and Plan of Treatment: You were found to have a worsening of your kidney function from your baseline, (serum creatinine was 5.3). Nephrology Dr. Bradshaw was consulted. Urology placed an indwelling bajwa catheter and your kidneys started to improve. We gave you gentle IV fluid hydration. We held nephrotoxic medications such as NSAID pain killers, and were cautious about the use of IV contrast if such scans were needed. You are advised to continue to stay well hydrated, and to seek medical attention if you have painful/burning urination, blood in urine, or increasing inability to control the flow of urine. Follow up with a nephrologist and urologist within 1 week of discharge. Physical therapy evaluated you and recommended subacute rehab.      SECONDARY DISCHARGE DIAGNOSES  Diagnosis: Urinary retention  Assessment and Plan of Treatment: You have a history of urinary retention. CT shows renal pelvis fullness of bilateral renal pelvis without calcified stone identified along the course the ureter. Bladder wall thickening. Prostate Enlargement. We started you on Flomax and Proscar. We will keep the bajwa in place. Follow up with urologist, Dr. Hicks within 1 week of discharge for removal of bajwa and a trial of urination.    Diagnosis: Normal anion gap metabolic acidosis  Assessment and Plan of Treatment: You had a low bicarbonate level and were found to have normal anion gap metabolic acidosis. We started you on sodium bicarbonate three times a day, per nephrology recommendations. Continue to take this medication as prescribed.    Diagnosis: Hyperkalemia  Assessment and Plan of Treatment: You presented with hyperkalemia and EKG showing peaked T waves. We gave you lokelma, dextrose and insulin which helped bring the potassium to normal. Your potassium levels have still been fluctuating. You are recommended to continue taking lokelma dialy for 3 days and repeat potassium levels next week.   PLEASE GET REPEAT POTASSIUM LEVELS DONE NEXT WEEK.    Diagnosis: Pulmonary fibrosis  Assessment and Plan of Treatment: You have a history of pulmonary fibrosis. Pulmonologist, Dr. Chery was consulted. You were saturating well on room air. Continue to take SPIRIVA, SYMBICORT and XOPENEX inhalers. CT chest showed redemonstrated pneumomediastinum, continued findings of diffuse lung fibrosis with peripheral/basilar reticular opacities and honeycombing. Thoracic Surgery recommended no acute intervention last admission. Follow up with pulmonologist for further management.    Diagnosis: Pneumomediastinum  Assessment and Plan of Treatment: Plan as above.    Diagnosis: Sinus tachycardia  Assessment and Plan of Treatment: We monitored your heart on telemetry. You had sinus tachycardia. We prescribed you metoprolol 12.5 mg TWICE a day. Continue to take this medications. Monitor your heart rate and blood pressure at home. If your heart rate is below 60 beats per minute or if your blood pressure is less than 100/60, stop the medication and follow up with your health care provider.     PRINCIPAL DISCHARGE DIAGNOSIS  Diagnosis: SANDI (acute kidney injury)  Assessment and Plan of Treatment: You were found to have a worsening of your kidney function from your baseline, (serum creatinine was 5.3). Nephrology Dr. Bradshaw was consulted. Urology placed an indwelling bajwa catheter and your kidneys started to improve. We gave you gentle IV fluid hydration. We held nephrotoxic medications such as NSAID pain killers, and were cautious about the use of IV contrast if such scans were needed. You are advised to continue to stay well hydrated, and to seek medical attention if you have painful/burning urination, blood in urine, or increasing inability to control the flow of urine. Follow up with a nephrologist and urologist within 1 week of discharge. Physical therapy evaluated you and recommended subacute rehab.      SECONDARY DISCHARGE DIAGNOSES  Diagnosis: Urinary retention  Assessment and Plan of Treatment: You have a history of urinary retention. CT shows renal pelvis fullness of bilateral renal pelvis without calcified stone identified along the course the ureter. Bladder wall thickening. Prostate Enlargement. We started you on Flomax and Proscar. We will keep the bajwa in place. Follow up with urologist, Dr. Hicks within 1 week of discharge for removal of bajwa and a trial of urination.    Diagnosis: Normal anion gap metabolic acidosis  Assessment and Plan of Treatment: You had a low bicarbonate level and were found to have normal anion gap metabolic acidosis. We started you on sodium bicarbonate three times a day, per nephrology recommendations. Continue to take this medication as prescribed.    Diagnosis: Hyperkalemia  Assessment and Plan of Treatment: You presented with hyperkalemia and EKG showing peaked T waves. We gave you lokelma, dextrose and insulin which helped bring the potassium to normal. Your potassium levels have still been fluctuating. You are recommended to continue taking lokelma daily for 3 days and repeat potassium levels next week.   PLEASE GET REPEAT POTASSIUM LEVELS DONE NEXT WEEK.    Diagnosis: Pulmonary fibrosis  Assessment and Plan of Treatment: You have a history of pulmonary fibrosis. Pulmonologist, Dr. Chery was consulted. You were saturating well on room air. Continue to take SPIRIVA inhaler. Symbicort and Levalbuterol caused increased sinus tachycardia. Pulmonogy recommended to stop beta agonists and continue Spiriva. CT chest showed redemonstrated pneumomediastinum, continued findings of diffuse lung fibrosis with peripheral/basilar reticular opacities and honeycombing. Thoracic Surgery recommended no acute intervention last admission. Follow up with pulmonologist for further management.    Diagnosis: Pneumomediastinum  Assessment and Plan of Treatment: Plan as above.    Diagnosis: Sinus tachycardia  Assessment and Plan of Treatment: We monitored your heart on telemetry. You had sinus tachycardia. We prescribed you metoprolol 12.5 mg TWICE a day. Continue to take this medications. Monitor your heart rate and blood pressure at home. If your heart rate is below 60 beats per minute or if your blood pressure is less than 100/60, stop the medication and follow up with your health care provider.     PRINCIPAL DISCHARGE DIAGNOSIS  Diagnosis: SANDI (acute kidney injury)  Assessment and Plan of Treatment: You were found to have a worsening of your kidney function from your baseline, (serum creatinine was 5.3). Nephrology Dr. Bradshaw was consulted. Urology placed an indwelling bajwa catheter and your kidneys started to improve. We gave you gentle IV fluid hydration. We held nephrotoxic medications such as NSAID pain killers, and were cautious about the use of IV contrast if such scans were needed. You are advised to continue to stay well hydrated, and to seek medical attention if you have painful/burning urination or blood in urine.  The rehab facility will need to monitor your creatinine.   Follow up with a nephrologist and urologist within 1 week of discharge. Physical therapy evaluated you and recommended subacute rehab.      SECONDARY DISCHARGE DIAGNOSES  Diagnosis: Urinary retention  Assessment and Plan of Treatment: You have a history of urinary retention. CT shows renal pelvis fullness of bilateral renal pelvis without calcified stone identified along the course the ureter. Bladder wall thickening. Prostate Enlargement. We started you on Flomax and Proscar. Urology placed a bajwa and  you will need to be dsicharge with the catheter.   Follow up with urologist, Dr. Hicks within 1 week of discharge for removal of bajwa and a trial of urination.    Diagnosis: Normal anion gap metabolic acidosis  Assessment and Plan of Treatment: You had a low bicarbonate level and were found to have normal anion gap metabolic acidosis. We started you on sodium bicarbonate three times a day, per nephrology recommendations. Continue to take this medication as prescribed.    Diagnosis: Hyperkalemia  Assessment and Plan of Treatment: You presented with hyperkalemia and EKG showing peaked T waves. We gave you lokelma, dextrose and insulin which helped bring the potassium to normal. Your potassium levels have still been fluctuating. Your potassium has remained stable within the normal limits and the rehab facility should continue to monitor your potassium levels.    Diagnosis: Pulmonary fibrosis  Assessment and Plan of Treatment: You have a history of pulmonary fibrosis. Pulmonologist, Dr. Chery was consulted. You were saturating well on room air. Continue to take SPIRIVA inhaler. Symbicort and Levalbuterol caused increased sinus tachycardia. Pulmonogy recommended to stop beta agonists and continue Spiriva. CT chest showed redemonstrated pneumomediastinum, continued findings of diffuse lung fibrosis with peripheral/basilar reticular opacities and honeycombing. Thoracic Surgery recommended no acute intervention last admission. Follow up with pulmonologist for further management.    Diagnosis: Pneumomediastinum  Assessment and Plan of Treatment: Plan as above.    Diagnosis: Sinus tachycardia  Assessment and Plan of Treatment: You were monitored on telemetry. You had sinus tachycardia. We prescribed you metoprolol 12.5 mg TWICE a day. Continue to take this medications. Monitor your heart rate and blood pressure at home. If your heart rate is below 60 beats per minute or if your blood pressure is less than 100/60, stop the medication and follow up with your health care provider.     PRINCIPAL DISCHARGE DIAGNOSIS  Diagnosis: SANDI (acute kidney injury)  Assessment and Plan of Treatment: You were found to have a worsening of your kidney function from your baseline, (serum creatinine was 5.3). Nephrology Dr. Bradshaw was consulted. Urology placed an indwelling bajwa catheter and your kidneys started to improve. We gave you gentle IV fluid hydration. We held nephrotoxic medications such as NSAID pain killers, and were cautious about the use of IV contrast if such scans were needed. You are advised to continue to stay well hydrated, and to seek medical attention if you have painful/burning urination or blood in urine.  The rehab facility will need to monitor your creatinine.   Follow up with a nephrologist and urologist within 1 week of discharge. Physical therapy evaluated you and recommended subacute rehab.      SECONDARY DISCHARGE DIAGNOSES  Diagnosis: 2019 novel coronavirus disease (COVID-19)  Assessment and Plan of Treatment: Your Covid test came back positive on 05/08/2023, you ahve no symtoms. No need for steroid at this time.   Please maintain isolation precautions.    Diagnosis: Urinary retention  Assessment and Plan of Treatment: You have a history of urinary retention. CT shows renal pelvis fullness of bilateral renal pelvis without calcified stone identified along the course the ureter. Bladder wall thickening. Prostate Enlargement. We started you on Flomax and Proscar. Urology placed a bajwa and  you will need to be dsicharge with the catheter.   Follow up with urologist, Dr. Hicks within 1 week of discharge for removal of bajwa and a trial of urination.    Diagnosis: Normal anion gap metabolic acidosis  Assessment and Plan of Treatment: You had a low bicarbonate level and were found to have normal anion gap metabolic acidosis. We started you on sodium bicarbonate three times a day, per nephrology recommendations. Continue to take this medication as prescribed.    Diagnosis: Hyperkalemia  Assessment and Plan of Treatment: You presented with hyperkalemia and EKG showing peaked T waves. We gave you lokelma, dextrose and insulin which helped bring the potassium to normal. Your potassium levels have still been fluctuating. Your potassium has remained stable within the normal limits and the rehab facility should continue to monitor your potassium levels.    Diagnosis: Pulmonary fibrosis  Assessment and Plan of Treatment: You have a history of pulmonary fibrosis. Pulmonologist, Dr. Chery was consulted. You were saturating well on room air. Continue to take SPIRIVA inhaler. Symbicort and Levalbuterol caused increased sinus tachycardia. Pulmonogy recommended to stop beta agonists and continue Spiriva. CT chest showed redemonstrated pneumomediastinum, continued findings of diffuse lung fibrosis with peripheral/basilar reticular opacities and honeycombing. Thoracic Surgery recommended no acute intervention last admission. Follow up with pulmonologist for further management.    Diagnosis: Pneumomediastinum  Assessment and Plan of Treatment: Plan as above.    Diagnosis: Sinus tachycardia  Assessment and Plan of Treatment: You were monitored on telemetry. You had sinus tachycardia. We prescribed you metoprolol 12.5 mg TWICE a day. Continue to take this medications. Monitor your heart rate and blood pressure at home. If your heart rate is below 60 beats per minute or if your blood pressure is less than 100/60, stop the medication and follow up with your health care provider.

## 2023-05-01 NOTE — PROGRESS NOTE ADULT - PROBLEM SELECTOR PLAN 3
pt with NAGMA, similar to previous admission  HCO3 16 likely due to acute renal failure  c/w sodium bicarbonate TID and calcitriol  - improved  Nephro following

## 2023-05-01 NOTE — PROGRESS NOTE ADULT - ATTENDING COMMENTS
87M from home with PMH of pulmonary fibrosis recent ICU admission with pneumothorax and urinary retention then leaving AMA presents with urinary retention and abdominal discomfort found to have acute hyperkalemia and SANDI. S/p bajwa placement with improvement in Cr and K. Course complicated by sinus tach, suspect due to overuse of duonebs. Pending [facility] GUADALUPE    HyperK resolved  SANDI likely ATN, obstructive, improving s/p bajwa  Uretention  Sinus tach  Pulm fibrosis  AHRF    -sandi improved with bajwa - appreciate urology recs - started finasteride, flomax  -appreciate nephro recs  -c/w ivf  -hyperK resolved  -appreciate pulm recs: was hypoxic initially but now sat well on room air - switch duonebs to Lev-albuterol  -sinus tachycardia suspect 2/2 frequent duoneb use - switched nebs to lev-albuterol and started low dose metoprolol  -f/u pulm recs  -dvt ppx  -PT rec JOSE GUADALUPE    pending [facility] GUADALUPE.

## 2023-05-01 NOTE — CHART NOTE - NSCHARTNOTEFT_GEN_A_CORE
Assessment:     Factors impacting intake: [ ] none [ ] nausea  [ ] vomiting [ ] diarrhea [ ] constipation  [ ]chewing problems [ ] swallowing issues  [ ] other:     Diet Presciption: Diet, Pureed:   Moderately Thick Liquids (MODTHICKLIQS)  Supplement Feeding Modality:  Oral  Ensure Pudding Cans or Servings Per Day:  1       Frequency:  Three Times a day (23 @ 13:19)    Intake:     Daily Weight in k.4 (2023 04:34)  Weight in k.5 (2023 04:33)    % Weight Change    Pertinent Medications: MEDICATIONS  (STANDING):  budesonide  80 MICROgram(s)/formoterol 4.5 MICROgram(s) Inhaler 2 Puff(s) Inhalation two times a day  calcitriol   Capsule 0.25 MICROGram(s) Oral <User Schedule>  chlorhexidine 2% Cloths 1 Application(s) Topical <User Schedule>  dextrose 5% + sodium chloride 0.45%. 1000 milliLiter(s) (60 mL/Hr) IV Continuous <Continuous>  ferrous    sulfate 325 milliGRAM(s) Oral daily  finasteride 5 milliGRAM(s) Oral daily  heparin   Injectable 5000 Unit(s) SubCutaneous every 12 hours  melatonin 5 milliGRAM(s) Oral at bedtime  sodium bicarbonate 650 milliGRAM(s) Oral three times a day  tamsulosin 0.4 milliGRAM(s) Oral at bedtime  tiotropium 2.5 MICROgram(s) Inhaler 2 Puff(s) Inhalation daily    MEDICATIONS  (PRN):  acetaminophen     Tablet .. 650 milliGRAM(s) Oral every 6 hours PRN Temp greater or equal to 38C (100.4F), Mild Pain (1 - 3)  albuterol/ipratropium for Nebulization 3 milliLiter(s) Nebulizer every 4 hours PRN Shortness of Breath and/or Wheezing    Pertinent Labs:  Na136 mmol/L Glu 99 mg/dL K+ 5.0 mmol/L Cr  3.87 mg/dL<H> BUN 55 mg/dL<H>  Phos 3.8 mg/dL  Alb 2.6 g/dL<L>     CAPILLARY BLOOD GLUCOSE          Skin:     Estimated Needs:   [ ] no change since previous assessment  [ ] recalculated:     Previous Nutrition Diagnosis:   [ ] Inadequate Energy Intake [ ]Inadequate Oral Intake [ ] Excessive Energy Intake   [ ] Underweight [ ] Increased Nutrient Needs [ ] Overweight/Obesity  [ ] Swallowing Difficult   [ ] Altered GI Function [ ] Unintended Weight Loss [ ] Food & Nutrition Related Knowledge Deficit [ ] Malnutrition   [ ] Not Ready for Diet/Life Style Changes     Nutrition Diagnosis is [ ] ongoing  [ ] Improving   [ ] resolved [ ] not applicable     New Nutrition Diagnosis: [ ] not applicable       Interventions:   Recommend  [ ] Change Diet To:  [ ] Nutrition Supplement  [ ] Nutrition Support  [ ] Other:     Monitoring and Evaluation:   [ ] PO intake [ x ] Tolerance to diet prescription [ x ] weights [ x ] labs[ x ] follow up per protocol  [ ] other: Assessment:      Nutrition consult requested verbally by RN today for food/nutrition related concerns. Chart reviewed, thicken fluid requested by pt per RD ( Initial Dietitian Evaluation) due to swallowing issue; Pt visited today, alert, oriented, hard of hearing, but well-communicated, now requesting Thin liquid, pt  claims no swallow problem, able to take regular liquid, declining thicken fluid; <25% breakfast intake observed; Per RN, swallow problem reported, coughing after thin fluid; Discussed with MD, Swallow evaluation recommended; Pending discharge planning to skilled nursing facility for rehab when medically ready per team      Factors impacting intake:  [ X ] other: difficulty chewing; difficulty swallowing; difficulty with food procurement/preparation; Christian/ethnic/cultural/personal food preferences; acute on chronic comorbidities     Diet Prescription: Diet, Pureed:   Moderately Thick Liquids (MODTHICKLIQS)  Supplement Feeding Modality:  Oral  Ensure Pudding Cans or Servings Per Day:  1       Frequency:  Three Times a day (23 @ 13:19)    Intake: see above     Daily Weight in k.4 (2023 04:34)  Weight in k.5 (2023 04:33)    % Weight Change    Pertinent Medications: MEDICATIONS  (STANDING):  budesonide  80 MICROgram(s)/formoterol 4.5 MICROgram(s) Inhaler 2 Puff(s) Inhalation two times a day  calcitriol   Capsule 0.25 MICROGram(s) Oral <User Schedule>  chlorhexidine 2% Cloths 1 Application(s) Topical <User Schedule>  dextrose 5% + sodium chloride 0.45%. 1000 milliLiter(s) (60 mL/Hr) IV Continuous <Continuous>  ferrous    sulfate 325 milliGRAM(s) Oral daily  finasteride 5 milliGRAM(s) Oral daily  heparin   Injectable 5000 Unit(s) SubCutaneous every 12 hours  melatonin 5 milliGRAM(s) Oral at bedtime  sodium bicarbonate 650 milliGRAM(s) Oral three times a day  tamsulosin 0.4 milliGRAM(s) Oral at bedtime  tiotropium 2.5 MICROgram(s) Inhaler 2 Puff(s) Inhalation daily    MEDICATIONS  (PRN):  acetaminophen     Tablet .. 650 milliGRAM(s) Oral every 6 hours PRN Temp greater or equal to 38C (100.4F), Mild Pain (1 - 3)  albuterol/ipratropium for Nebulization 3 milliLiter(s) Nebulizer every 4 hours PRN Shortness of Breath and/or Wheezing    Pertinent Labs:  Na136 mmol/L Glu 99 mg/dL K+ 5.0 mmol/L Cr  3.87 mg/dL<H> BUN 55 mg/dL<H>  Phos 3.8 mg/dL  Alb 2.6 g/dL<L>     CAPILLARY BLOOD GLUCOSE    Skin: skin intact     Estimated Needs:   [ X ] no change since previous assessment  [ ] recalculated:     Previous Nutrition Diagnosis:   [ X ] Malnutrition ( SEVERE)     Nutrition Diagnosis is [ X ] ongoing  [ ] Improving   [ ] resolved [ ] not applicable     New Nutrition Diagnosis: [ X ] not applicable     Interventions:   Recommend  [ X ] Continue diet Rx as ordered; Rec: Swallow evaluation as medically feasible   [ ] Nutrition Supplement  [ ] Nutrition Support  [ X ] Other: Discussed with MD/RN                    Nursing to continue feeding assistance and encouragement as needed, aspiration precaution                    Provide food choices within diet Rx as available/updated     Monitoring and Evaluation:   [ X ] PO intake [ x ] Tolerance to diet prescription [ x ] weights [ x ] labs[ x ] follow up per protocol  [ ] other: Assessment:      Nutrition consult requested verbally by RN today for food/nutrition related concerns. Chart reviewed, thicken fluid requested by pt per RD ( Initial Dietitian Evaluation) due to swallowing issue; Pt visited today, alert, oriented, cachectic, hard of hearing, but well-communicated, now requesting Thin liquid, pt  claims no swallow problem, able to take regular liquid, declining thicken fluid; <25% breakfast intake observed; Per RN, swallow problem reported, coughing after thin fluid; Discussed with MD, Swallow evaluation recommended; Pending discharge planning to skilled nursing facility for rehab when medically ready per team      Factors impacting intake:  [ X ] other: difficulty chewing; difficulty swallowing; difficulty with food procurement/preparation; Orthodoxy/ethnic/cultural/personal food preferences; acute on chronic comorbidities     Diet Prescription: Diet, Pureed:   Moderately Thick Liquids (MODTHICKLIQS)  Supplement Feeding Modality:  Oral  Ensure Pudding Cans or Servings Per Day:  1       Frequency:  Three Times a day (23 @ 13:19)    Intake: see above     Daily Weight in k.4 (2023 04:34)  Weight in k.5 (2023 04:33)    % Weight Change    Pertinent Medications: MEDICATIONS  (STANDING):  budesonide  80 MICROgram(s)/formoterol 4.5 MICROgram(s) Inhaler 2 Puff(s) Inhalation two times a day  calcitriol   Capsule 0.25 MICROGram(s) Oral <User Schedule>  chlorhexidine 2% Cloths 1 Application(s) Topical <User Schedule>  dextrose 5% + sodium chloride 0.45%. 1000 milliLiter(s) (60 mL/Hr) IV Continuous <Continuous>  ferrous    sulfate 325 milliGRAM(s) Oral daily  finasteride 5 milliGRAM(s) Oral daily  heparin   Injectable 5000 Unit(s) SubCutaneous every 12 hours  melatonin 5 milliGRAM(s) Oral at bedtime  sodium bicarbonate 650 milliGRAM(s) Oral three times a day  tamsulosin 0.4 milliGRAM(s) Oral at bedtime  tiotropium 2.5 MICROgram(s) Inhaler 2 Puff(s) Inhalation daily    MEDICATIONS  (PRN):  acetaminophen     Tablet .. 650 milliGRAM(s) Oral every 6 hours PRN Temp greater or equal to 38C (100.4F), Mild Pain (1 - 3)  albuterol/ipratropium for Nebulization 3 milliLiter(s) Nebulizer every 4 hours PRN Shortness of Breath and/or Wheezing    Pertinent Labs:  Na136 mmol/L Glu 99 mg/dL K+ 5.0 mmol/L Cr  3.87 mg/dL<H> BUN 55 mg/dL<H>  Phos 3.8 mg/dL  Alb 2.6 g/dL<L>     CAPILLARY BLOOD GLUCOSE    Skin: skin intact     Estimated Needs:   [ X ] no change since previous assessment  [ ] recalculated:     Previous Nutrition Diagnosis:   [ X ] Malnutrition ( SEVERE)     Nutrition Diagnosis is [ X ] ongoing  [ ] Improving   [ ] resolved [ ] not applicable     New Nutrition Diagnosis: [ X ] not applicable     Interventions:   Recommend  [ X ] Diet changed by MD for now to Puree, Thin fluid with oral nutritional supplement until Swallow evaluation   [ ] Nutrition Supplement  [ ] Nutrition Support  [ X ] Other: Discussed with MD/RN; Pending Swallow evaluation                    Nursing to continue feeding assistance and encouragement as needed, aspiration precaution                    Provide food choices within diet Rx as available/updated     Monitoring and Evaluation:   [ X ] PO intake [ x ] Tolerance to diet prescription [ x ] weights [ x ] labs[ x ] follow up per protocol  [ ] other:

## 2023-05-01 NOTE — PROGRESS NOTE ADULT - SUBJECTIVE AND OBJECTIVE BOX
PGY-1 Progress Note discussed with attending    PAGER #: [[phone]] TILL 5:00 PM  PLEASE CONTACT ON CALL TEAM:   - On Call Team (Please refer to Dustin) FROM 5:00 PM - 8:30PM  - Nightfloat Team FROM 8:30 -7:30 AM    INTERVAL HPI/OVERNIGHT EVENTS:       REVIEW OF SYSTEMS:  CONSTITUTIONAL: No fever, weight loss, or fatigue  RESPIRATORY: No cough, wheezing, chills or hemoptysis; No shortness of breath  CARDIOVASCULAR: No chest pain, palpitations, dizziness, or leg swelling  GASTROINTESTINAL: No abdominal pain. No nausea, vomiting, or hematemesis; No diarrhea or constipation. No melena or hematochezia.  GENITOURINARY: No dysuria or hematuria, urinary frequency  NEUROLOGICAL: No headaches, memory loss, loss of strength, numbness, or tremors  SKIN: No itching, burning, rashes, or lesions     MEDICATIONS  (STANDING):  albuterol/ipratropium for Nebulization 3 milliLiter(s) Nebulizer every 6 hours  budesonide  80 MICROgram(s)/formoterol 4.5 MICROgram(s) Inhaler 2 Puff(s) Inhalation two times a day  calcitriol   Capsule 0.25 MICROGram(s) Oral <User Schedule>  chlorhexidine 2% Cloths 1 Application(s) Topical <User Schedule>  dextrose 5% + sodium chloride 0.45%. 1000 milliLiter(s) (60 mL/Hr) IV Continuous <Continuous>  ferrous    sulfate 325 milliGRAM(s) Oral daily  finasteride 5 milliGRAM(s) Oral daily  heparin   Injectable 5000 Unit(s) SubCutaneous every 12 hours  melatonin 5 milliGRAM(s) Oral at bedtime  sodium bicarbonate 650 milliGRAM(s) Oral three times a day  tamsulosin 0.4 milliGRAM(s) Oral at bedtime  tiotropium 2.5 MICROgram(s) Inhaler 2 Puff(s) Inhalation daily    MEDICATIONS  (PRN):  acetaminophen     Tablet .. 650 milliGRAM(s) Oral every 6 hours PRN Temp greater or equal to 38C (100.4F), Mild Pain (1 - 3)      Vital Signs Last 24 Hrs  T(C): 36.4 (01 May 2023 07:25), Max: 36.8 (30 Apr 2023 11:15)  T(F): 97.5 (01 May 2023 07:25), Max: 98.3 (30 Apr 2023 11:15)  HR: 105 (01 May 2023 07:25) (97 - 110)  BP: 132/61 (01 May 2023 07:25) (103/56 - 132/61)  BP(mean): 73 (30 Apr 2023 20:18) (70 - 73)  RR: 20 (01 May 2023 07:25) (18 - 20)  SpO2: 99% (01 May 2023 07:25) (98% - 100%)    Parameters below as of 01 May 2023 07:25  Patient On (Oxygen Delivery Method): room air        PHYSICAL EXAMINATION:  GENERAL: NAD, well built  HEAD:  Atraumatic, Normocephalic  EYES:  conjunctiva and sclera clear  NECK: Supple, No JVD, Normal thyroid  CHEST/LUNG: Clear to auscultation. Clear to percussion bilaterally; No rales, rhonchi, wheezing, or rubs  HEART: Regular rate and rhythm; No murmurs, rubs, or gallops  ABDOMEN: Soft, Nontender, Nondistended; Bowel sounds present  NERVOUS SYSTEM:  Alert & Oriented X3,    EXTREMITIES:  2+ Peripheral Pulses, No clubbing, cyanosis, or edema  SKIN: warm dry                          7.2    7.28  )-----------( 190      ( 01 May 2023 06:52 )             21.7     05-01    136  |  107  |  55<H>  ----------------------------<  99  5.0   |  21<L>  |  3.87<H>    Ca    8.2<L>      01 May 2023 06:52  Phos  3.8     05-01  Mg     1.7     05-01    TPro  6.7  /  Alb  2.6<L>  /  TBili  0.3  /  DBili  x   /  AST  17  /  ALT  18  /  AlkPhos  51  05-01    LIVER FUNCTIONS - ( 01 May 2023 06:52 )  Alb: 2.6 g/dL / Pro: 6.7 g/dL / ALK PHOS: 51 U/L / ALT: 18 U/L DA / AST: 17 U/L / GGT: x                       I&O's Summary    30 Apr 2023 07:01  -  01 May 2023 07:00  --------------------------------------------------------  IN: 0 mL / OUT: 1000 mL / NET: -1000 mL        CAPILLARY BLOOD GLUCOSE        CAPILLARY BLOOD GLUCOSE          RADIOLOGY & ADDITIONAL TESTS:                   PGY-1 Progress Note discussed with attending    PAGER #: [[phone]] TILL 5:00 PM  PLEASE CONTACT ON CALL TEAM:   - On Call Team (Please refer to Dustin) FROM 5:00 PM - 8:30PM  - Nightfloat Team FROM 8:30 -7:30 AM    INTERVAL HPI/OVERNIGHT EVENTS:   No acute overnight events. Pt states he would like to drink thin liquids and is frustrated with thick liquids. Will obtain Speech and swallow as nursing staff has witnessed coughing episodes while eating. Otherwise pt denies any other acute complaints.     REVIEW OF SYSTEMS:  CONSTITUTIONAL: No fever, weight loss, or fatigue  RESPIRATORY: No cough, wheezing, chills or hemoptysis; No shortness of breath  CARDIOVASCULAR: No chest pain, palpitations, dizziness, or leg swelling  GASTROINTESTINAL: No abdominal pain. No nausea, vomiting, or hematemesis; No diarrhea or constipation. No melena or hematochezia.  GENITOURINARY: No dysuria or hematuria, urinary frequency  NEUROLOGICAL: No headaches, memory loss, loss of strength, numbness, or tremors  SKIN: No itching, burning, rashes, or lesions     MEDICATIONS  (STANDING):  albuterol/ipratropium for Nebulization 3 milliLiter(s) Nebulizer every 6 hours  budesonide  80 MICROgram(s)/formoterol 4.5 MICROgram(s) Inhaler 2 Puff(s) Inhalation two times a day  calcitriol   Capsule 0.25 MICROGram(s) Oral <User Schedule>  chlorhexidine 2% Cloths 1 Application(s) Topical <User Schedule>  dextrose 5% + sodium chloride 0.45%. 1000 milliLiter(s) (60 mL/Hr) IV Continuous <Continuous>  ferrous    sulfate 325 milliGRAM(s) Oral daily  finasteride 5 milliGRAM(s) Oral daily  heparin   Injectable 5000 Unit(s) SubCutaneous every 12 hours  melatonin 5 milliGRAM(s) Oral at bedtime  sodium bicarbonate 650 milliGRAM(s) Oral three times a day  tamsulosin 0.4 milliGRAM(s) Oral at bedtime  tiotropium 2.5 MICROgram(s) Inhaler 2 Puff(s) Inhalation daily    MEDICATIONS  (PRN):  acetaminophen     Tablet .. 650 milliGRAM(s) Oral every 6 hours PRN Temp greater or equal to 38C (100.4F), Mild Pain (1 - 3)      Vital Signs Last 24 Hrs  T(C): 36.4 (01 May 2023 07:25), Max: 36.8 (30 Apr 2023 11:15)  T(F): 97.5 (01 May 2023 07:25), Max: 98.3 (30 Apr 2023 11:15)  HR: 105 (01 May 2023 07:25) (97 - 110)  BP: 132/61 (01 May 2023 07:25) (103/56 - 132/61)  BP(mean): 73 (30 Apr 2023 20:18) (70 - 73)  RR: 20 (01 May 2023 07:25) (18 - 20)  SpO2: 99% (01 May 2023 07:25) (98% - 100%)    Parameters below as of 01 May 2023 07:25  Patient On (Oxygen Delivery Method): room air        PHYSICAL EXAMINATION:  GENERAL: NAD, elderly frail male  HEAD:  Atraumatic, Normocephalic  EYES:  conjunctiva and sclera clear  NECK: Supple, No JVD, Normal thyroid  CHEST/LUNG: + mild bibasilar crackles. Clear to percussion bilaterally; No rales, rhonchi, wheezing, or rubs  HEART: Regular rate and rhythm; No murmurs, rubs, or gallops  ABDOMEN: Soft, Nontender, Nondistended; Bowel sounds present  NERVOUS SYSTEM:  Alert & Oriented X3  EXTREMITIES:  2+ Peripheral Pulses, No clubbing, cyanosis, or edema  SKIN: warm dry, + bajwa                          7.2    7.28  )-----------( 190      ( 01 May 2023 06:52 )             21.7     05-01    136  |  107  |  55<H>  ----------------------------<  99  5.0   |  21<L>  |  3.87<H>    Ca    8.2<L>      01 May 2023 06:52  Phos  3.8     05-01  Mg     1.7     05-01    TPro  6.7  /  Alb  2.6<L>  /  TBili  0.3  /  DBili  x   /  AST  17  /  ALT  18  /  AlkPhos  51  05-01    LIVER FUNCTIONS - ( 01 May 2023 06:52 )  Alb: 2.6 g/dL / Pro: 6.7 g/dL / ALK PHOS: 51 U/L / ALT: 18 U/L DA / AST: 17 U/L / GGT: x                       I&O's Summary    30 Apr 2023 07:01  -  01 May 2023 07:00  --------------------------------------------------------  IN: 0 mL / OUT: 1000 mL / NET: -1000 mL

## 2023-05-01 NOTE — PROGRESS NOTE ADULT - ASSESSMENT
87 year old M from home with no known PMH presents with urinary retention. Patient reports since being discharged he has had very little urine output. Denies any blood in urine or pain. Denies fever, chills, cp, sob, palpitations, lightheadedness, dizziness, n/v/d, abdominal pain, constipation.  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  5) Anemia of chronic kidney disease  6) Renal osteodystrophy  7) NAGMA  8) BPH with LUTs      Recommend:  Strict I/o  Avoid Nephrotoxic agents  S cr downtrending to 3.8 with non oliguria  Continue S/p Lomax's catheter  Monitor BMP and electrolytes daily  Continue Flomax and finasteride  CT abdomen and pelvis without contrast results reviewed  Urology/pulmonary consult reviewed  Able to tolerate po intake  Anemia panel / CKD work up reviewed  Continue Sodium bicarbonate 650 mg po tid with meals with goal serum bicarbonate>22  Continue po calcitriol 0.25 mcg po TIW  Iron studies/Hgb/Hct reviewed  Ferrous sulfate/EPO as per ordered  PSA within normal limits  Volume status and electrolytes noted  No urgent need for RRT/HD  GOC/ Advance directives per patient/family  Discharge planning per primary [place]  Out patient renal follow up on discharge  Will follow

## 2023-05-01 NOTE — PROGRESS NOTE ADULT - PROBLEM SELECTOR PLAN 5
CT shows redemonstrated pneumomediastinum, continued findings of diffuse lung fibrosis with peripheral/basilar reticular opacities and honeycombing.   CT Surgery recommended no acute intervention last admission  c/w Spiriva, Symbicort  Started on Levalbuterol due to increased sinus tachycardia on albuterol  saturating well on room air  Dr. Miri Mcclure following

## 2023-05-01 NOTE — DISCHARGE NOTE PROVIDER - ATTENDING DISCHARGE PHYSICAL EXAMINATION:
Vital Signs Last 24 Hrs  T(C): 36.9 (08 May 2023 13:24), Max: 36.9 (08 May 2023 13:24)  T(F): 98.5 (08 May 2023 13:24), Max: 98.5 (08 May 2023 13:24)  HR: 110 (08 May 2023 13:24) (106 - 114)  BP: 133/51 (08 May 2023 13:24) (126/52 - 133/51)  BP(mean): 77 (08 May 2023 05:08) (77 - 77)  RR: 17 (08 May 2023 13:24) (17 - 19)  SpO2: 99% (08 May 2023 13:24) (96% - 99%)    Parameters below as of 08 May 2023 13:24  Patient On (Oxygen Delivery Method): room air    PHYSICAL EXAM:  GENERAL: elderly male, thin built   HEENT: Normocephalic;  conjunctivae and sclerae clear; moist mucous membranes;   NECK : supple  CHEST/LUNG: Clear to auscultation bilaterally with good air entry   HEART: S1 S2  regular; no murmurs, gallops or rubs  ABDOMEN: Soft, Nontender, Nondistended; Bowel sounds present  EXTREMITIES: no cyanosis; no edema; no calf tenderness  SKIN: warm and dry; no rash  NERVOUS SYSTEM:  Awake and alert; Oriented  to place, person and time ; no new deficits  : Lomax w/ clear urine

## 2023-05-01 NOTE — DISCHARGE NOTE PROVIDER - DETAILS OF MALNUTRITION DIAGNOSIS/DIAGNOSES
This patient has been assessed with a concern for Malnutrition and was treated during this hospitalization for the following Nutrition diagnosis/diagnoses:     -  04/29/2023: Severe protein-calorie malnutrition   -  04/29/2023: Underweight (BMI < 19)

## 2023-05-02 LAB
ALBUMIN SERPL ELPH-MCNC: 2.4 G/DL — LOW (ref 3.5–5)
ALP SERPL-CCNC: 49 U/L — SIGNIFICANT CHANGE UP (ref 40–120)
ALT FLD-CCNC: 19 U/L DA — SIGNIFICANT CHANGE UP (ref 10–60)
ANION GAP SERPL CALC-SCNC: 7 MMOL/L — SIGNIFICANT CHANGE UP (ref 5–17)
AST SERPL-CCNC: 15 U/L — SIGNIFICANT CHANGE UP (ref 10–40)
BILIRUB SERPL-MCNC: 0.3 MG/DL — SIGNIFICANT CHANGE UP (ref 0.2–1.2)
BUN SERPL-MCNC: 48 MG/DL — HIGH (ref 7–18)
CALCIUM SERPL-MCNC: 8.5 MG/DL — SIGNIFICANT CHANGE UP (ref 8.4–10.5)
CHLORIDE SERPL-SCNC: 107 MMOL/L — SIGNIFICANT CHANGE UP (ref 96–108)
CO2 SERPL-SCNC: 20 MMOL/L — LOW (ref 22–31)
CREAT SERPL-MCNC: 3.67 MG/DL — HIGH (ref 0.5–1.3)
EGFR: 16 ML/MIN/1.73M2 — LOW
GLUCOSE SERPL-MCNC: 98 MG/DL — SIGNIFICANT CHANGE UP (ref 70–99)
HCT VFR BLD CALC: 23.2 % — LOW (ref 39–50)
HGB BLD-MCNC: 7.6 G/DL — LOW (ref 13–17)
MAGNESIUM SERPL-MCNC: 1.7 MG/DL — SIGNIFICANT CHANGE UP (ref 1.6–2.6)
MCHC RBC-ENTMCNC: 31.5 PG — SIGNIFICANT CHANGE UP (ref 27–34)
MCHC RBC-ENTMCNC: 32.8 GM/DL — SIGNIFICANT CHANGE UP (ref 32–36)
MCV RBC AUTO: 96.3 FL — SIGNIFICANT CHANGE UP (ref 80–100)
NRBC # BLD: 0 /100 WBCS — SIGNIFICANT CHANGE UP (ref 0–0)
PHOSPHATE SERPL-MCNC: 3.8 MG/DL — SIGNIFICANT CHANGE UP (ref 2.5–4.5)
PLATELET # BLD AUTO: 197 K/UL — SIGNIFICANT CHANGE UP (ref 150–400)
POTASSIUM SERPL-MCNC: 4.7 MMOL/L — SIGNIFICANT CHANGE UP (ref 3.5–5.3)
POTASSIUM SERPL-SCNC: 4.7 MMOL/L — SIGNIFICANT CHANGE UP (ref 3.5–5.3)
PROT SERPL-MCNC: 6.4 G/DL — SIGNIFICANT CHANGE UP (ref 6–8.3)
RBC # BLD: 2.41 M/UL — LOW (ref 4.2–5.8)
RBC # FLD: 13.4 % — SIGNIFICANT CHANGE UP (ref 10.3–14.5)
SODIUM SERPL-SCNC: 134 MMOL/L — LOW (ref 135–145)
WBC # BLD: 8.8 K/UL — SIGNIFICANT CHANGE UP (ref 3.8–10.5)
WBC # FLD AUTO: 8.8 K/UL — SIGNIFICANT CHANGE UP (ref 3.8–10.5)

## 2023-05-02 PROCEDURE — 99232 SBSQ HOSP IP/OBS MODERATE 35: CPT | Mod: GC

## 2023-05-02 PROCEDURE — 99232 SBSQ HOSP IP/OBS MODERATE 35: CPT

## 2023-05-02 RX ORDER — OXYCODONE HYDROCHLORIDE 5 MG/1
2.5 TABLET ORAL ONCE
Refills: 0 | Status: DISCONTINUED | OUTPATIENT
Start: 2023-05-02 | End: 2023-05-02

## 2023-05-02 RX ORDER — BUDESONIDE AND FORMOTEROL FUMARATE DIHYDRATE 160; 4.5 UG/1; UG/1
2 AEROSOL RESPIRATORY (INHALATION)
Qty: 1 | Refills: 0
Start: 2023-05-02 | End: 2023-05-31

## 2023-05-02 RX ORDER — LEVALBUTEROL 1.25 MG/.5ML
3 SOLUTION, CONCENTRATE RESPIRATORY (INHALATION)
Qty: 360 | Refills: 0
Start: 2023-05-02 | End: 2023-05-31

## 2023-05-02 RX ORDER — METOPROLOL TARTRATE 50 MG
0.5 TABLET ORAL
Qty: 30 | Refills: 0
Start: 2023-05-02 | End: 2023-05-31

## 2023-05-02 RX ORDER — SODIUM BICARBONATE 1 MEQ/ML
1 SYRINGE (ML) INTRAVENOUS
Qty: 90 | Refills: 0
Start: 2023-05-02 | End: 2023-05-31

## 2023-05-02 RX ORDER — TAMSULOSIN HYDROCHLORIDE 0.4 MG/1
1 CAPSULE ORAL
Qty: 30 | Refills: 0
Start: 2023-05-02 | End: 2023-05-31

## 2023-05-02 RX ORDER — FINASTERIDE 5 MG/1
1 TABLET, FILM COATED ORAL
Qty: 0 | Refills: 0 | DISCHARGE
Start: 2023-05-02

## 2023-05-02 RX ORDER — FERROUS SULFATE 325(65) MG
1 TABLET ORAL
Qty: 30 | Refills: 0
Start: 2023-05-02 | End: 2023-05-31

## 2023-05-02 RX ORDER — FINASTERIDE 5 MG/1
1 TABLET, FILM COATED ORAL
Qty: 30 | Refills: 0
Start: 2023-05-02 | End: 2023-05-31

## 2023-05-02 RX ORDER — CALCITRIOL 0.5 UG/1
1 CAPSULE ORAL
Qty: 13 | Refills: 0
Start: 2023-05-02 | End: 2023-05-31

## 2023-05-02 RX ADMIN — OXYCODONE HYDROCHLORIDE 2.5 MILLIGRAM(S): 5 TABLET ORAL at 10:48

## 2023-05-02 RX ADMIN — LEVALBUTEROL 0.63 MILLIGRAM(S): 1.25 SOLUTION, CONCENTRATE RESPIRATORY (INHALATION) at 20:26

## 2023-05-02 RX ADMIN — OXYCODONE HYDROCHLORIDE 2.5 MILLIGRAM(S): 5 TABLET ORAL at 22:04

## 2023-05-02 RX ADMIN — BUDESONIDE AND FORMOTEROL FUMARATE DIHYDRATE 2 PUFF(S): 160; 4.5 AEROSOL RESPIRATORY (INHALATION) at 10:06

## 2023-05-02 RX ADMIN — LEVALBUTEROL 0.63 MILLIGRAM(S): 1.25 SOLUTION, CONCENTRATE RESPIRATORY (INHALATION) at 14:14

## 2023-05-02 RX ADMIN — OXYCODONE HYDROCHLORIDE 2.5 MILLIGRAM(S): 5 TABLET ORAL at 01:47

## 2023-05-02 RX ADMIN — LEVALBUTEROL 0.63 MILLIGRAM(S): 1.25 SOLUTION, CONCENTRATE RESPIRATORY (INHALATION) at 08:49

## 2023-05-02 RX ADMIN — FINASTERIDE 5 MILLIGRAM(S): 5 TABLET, FILM COATED ORAL at 12:47

## 2023-05-02 RX ADMIN — HEPARIN SODIUM 5000 UNIT(S): 5000 INJECTION INTRAVENOUS; SUBCUTANEOUS at 05:26

## 2023-05-02 RX ADMIN — SODIUM CHLORIDE 60 MILLILITER(S): 9 INJECTION, SOLUTION INTRAVENOUS at 21:25

## 2023-05-02 RX ADMIN — OXYCODONE HYDROCHLORIDE 2.5 MILLIGRAM(S): 5 TABLET ORAL at 21:42

## 2023-05-02 RX ADMIN — LEVALBUTEROL 0.63 MILLIGRAM(S): 1.25 SOLUTION, CONCENTRATE RESPIRATORY (INHALATION) at 03:19

## 2023-05-02 RX ADMIN — OXYCODONE HYDROCHLORIDE 2.5 MILLIGRAM(S): 5 TABLET ORAL at 10:13

## 2023-05-02 RX ADMIN — OXYCODONE HYDROCHLORIDE 2.5 MILLIGRAM(S): 5 TABLET ORAL at 02:30

## 2023-05-02 RX ADMIN — SODIUM CHLORIDE 60 MILLILITER(S): 9 INJECTION, SOLUTION INTRAVENOUS at 18:14

## 2023-05-02 NOTE — PROGRESS NOTE ADULT - ASSESSMENT
87 year old M from home with PMH of pulmonary fibrosis, pertinent recent admission to [facility] ICU (4/23-4/26) for close monitoring. CT show pulmonary fibrosis with pneumomediastinum and small right apical PTX . He was initially started on BiPaP changed to NC. NAGMA due to SANDI. Pt left AMA. Pt presents with urinary retention and acute hyperkalemia admitted to telemetry for management of hyperkalemia and SANDI.

## 2023-05-02 NOTE — PROGRESS NOTE ADULT - PROBLEM SELECTOR PLAN 1
patient presents with elevated Cr to 5.3, unclear baseline however recently elevated up to 6.8 and improving since prior admission  associated with urinary retention  bajwa placed by urology overnight per patient request in ED  CT shows renal pelvis fullness of bilateral renal pelvis without calcified stone identified along the course the ureter. Bladder wall thickening. Prostate Enlargement.    - maintain bajwa on discharge  - monitor Cr  - avoid nephrotoxic agents  - Cr improving daily  - NephDr. Augustine silva following patient presents with elevated Cr to 5.3, unclear baseline however recently elevated up to 6.8 and improving since prior admission  associated with urinary retention  bajwa placed by urology overnight per patient request in ED  CT shows renal pelvis fullness of bilateral renal pelvis without calcified stone identified along the course the ureter. Bladder wall thickening. Prostate Enlargement.  - maintain bajwa on discharge  - monitor Cr  - avoid nephrotoxic agents  - Cr improving daily  - NephDr. Augustine silva following

## 2023-05-02 NOTE — SWALLOW BEDSIDE ASSESSMENT ADULT - SLP PERTINENT HISTORY OF CURRENT PROBLEM
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 Left AMA from ICU then returned to hospital 4/27 with urinary retention and ongoing renal failure for which he was re-admitted. Asked for mgmt recs in ILD/IPF.

## 2023-05-02 NOTE — CHART NOTE - NSCHARTNOTEFT_GEN_A_CORE
Paged by Speech pathologist.    Recommended minced and moist diet with thin liquids via individual sips and aspiration precautions. Changes made to diet. Primary team to continue to follow.

## 2023-05-02 NOTE — PROGRESS NOTE ADULT - PROBLEM SELECTOR PLAN 5
CT shows redemonstrated pneumomediastinum, continued findings of diffuse lung fibrosis with peripheral/basilar reticular opacities and honeycombing.   CT Surgery recommended no acute intervention last admission  c/w Spiriva, Symbicort  Started on Levalbuterol due to increased sinus tachycardia on albuterol  saturating well on room air  Dr. Miri Mcclure following CT shows redemonstrated pneumomediastinum, continued findings of diffuse lung fibrosis with peripheral/basilar reticular opacities and honeycombing.   CT Surgery recommended no acute intervention last admission  c/w Spiriva, Symbicort  C/w on Levalbuterol due to increased sinus tachycardia on albuterol  saturating well on room air  Dr. Miri Mcclure following

## 2023-05-02 NOTE — SWALLOW BEDSIDE ASSESSMENT ADULT - PHARYNGEAL PHASE
mildly decreased laryngeal elevation; 1-2 swallows per bolus. No overt s/s of aspiration Within functional limits

## 2023-05-02 NOTE — PROGRESS NOTE ADULT - SUBJECTIVE AND OBJECTIVE BOX
PGY-1 Progress Note discussed with attending    INTERVAL HPI/OVERNIGHT EVENTS:   MEDICATIONS  (STANDING):  budesonide  80 MICROgram(s)/formoterol 4.5 MICROgram(s) Inhaler 2 Puff(s) Inhalation two times a day  calcitriol   Capsule 0.25 MICROGram(s) Oral <User Schedule>  dextrose 5% + sodium chloride 0.45%. 1000 milliLiter(s) (60 mL/Hr) IV Continuous <Continuous>  ferrous    sulfate 325 milliGRAM(s) Oral daily  finasteride 5 milliGRAM(s) Oral daily  heparin   Injectable 5000 Unit(s) SubCutaneous every 12 hours  levalbuterol Inhalation 0.63 milliGRAM(s) Inhalation every 6 hours  melatonin 5 milliGRAM(s) Oral at bedtime  metoprolol tartrate 12.5 milliGRAM(s) Oral two times a day  sodium bicarbonate 650 milliGRAM(s) Oral three times a day  tamsulosin 0.4 milliGRAM(s) Oral at bedtime    MEDICATIONS  (PRN):  acetaminophen     Tablet .. 650 milliGRAM(s) Oral every 6 hours PRN Temp greater or equal to 38C (100.4F), Mild Pain (1 - 3)      REVIEW OF SYSTEMS:  CONSTITUTIONAL: No fever, weight loss, or fatigue  RESPIRATORY: No cough, wheezing, chills or hemoptysis; No shortness of breath  CARDIOVASCULAR: No chest pain, palpitations, dizziness, or leg swelling  GASTROINTESTINAL: No abdominal pain. No nausea, vomiting, or hematemesis; No diarrhea or constipation. No melena or hematochezia.  GENITOURINARY: No dysuria or hematuria, urinary frequency  NEUROLOGICAL: No headaches, memory loss, loss of strength, numbness, or tremors  SKIN: No itching, burning, rashes, or lesions     Vital Signs Last 24 Hrs  T(C): 36.7 (02 May 2023 07:26), Max: 36.9 (02 May 2023 04:47)  T(F): 98.1 (02 May 2023 07:26), Max: 98.4 (02 May 2023 04:47)  HR: 98 (02 May 2023 07:26) (96 - 107)  BP: 109/65 (02 May 2023 07:26) (108/71 - 126/58)  BP(mean): --  RR: 18 (02 May 2023 07:26) (17 - 18)  SpO2: 99% (02 May 2023 07:26) (92% - 100%)    Parameters below as of 02 May 2023 07:26  Patient On (Oxygen Delivery Method): room air        PHYSICAL EXAMINATION:  GENERAL: NAD, well built  HEAD:  Atraumatic, Normocephalic  EYES:  conjunctiva and sclera clear  NECK: Supple, No JVD, Normal thyroid  CHEST/LUNG: Clear to auscultation. Clear to percussion bilaterally; No rales, rhonchi, wheezing, or rubs  HEART: Regular rate and rhythm; No murmurs, rubs, or gallops  ABDOMEN: Soft, Nontender, Nondistended; Bowel sounds present  NERVOUS SYSTEM:  Alert & Oriented X3,    EXTREMITIES:  2+ Peripheral Pulses, No clubbing, cyanosis, or edema  SKIN: warm dry                          7.6    8.80  )-----------( 197      ( 02 May 2023 06:49 )             23.2     05-02    134<L>  |  107  |  48<H>  ----------------------------<  98  4.7   |  20<L>  |  3.67<H>    Ca    8.5      02 May 2023 06:49  Phos  3.8     05-02  Mg     1.7     05-02    TPro  6.4  /  Alb  2.4<L>  /  TBili  0.3  /  DBili  x   /  AST  15  /  ALT  19  /  AlkPhos  49  05-02    LIVER FUNCTIONS - ( 02 May 2023 06:49 )  Alb: 2.4 g/dL / Pro: 6.4 g/dL / ALK PHOS: 49 U/L / ALT: 19 U/L DA / AST: 15 U/L / GGT: x                   CAPILLARY BLOOD GLUCOSE      RADIOLOGY & ADDITIONAL TESTS:                   PGY-1 Progress Note discussed with attending    INTERVAL HPI/OVERNIGHT EVENTS:   No acute overnight events. Patient denies any complains at this time.     MEDICATIONS  (STANDING):  budesonide  80 MICROgram(s)/formoterol 4.5 MICROgram(s) Inhaler 2 Puff(s) Inhalation two times a day  calcitriol   Capsule 0.25 MICROGram(s) Oral <User Schedule>  dextrose 5% + sodium chloride 0.45%. 1000 milliLiter(s) (60 mL/Hr) IV Continuous <Continuous>  ferrous    sulfate 325 milliGRAM(s) Oral daily  finasteride 5 milliGRAM(s) Oral daily  heparin   Injectable 5000 Unit(s) SubCutaneous every 12 hours  levalbuterol Inhalation 0.63 milliGRAM(s) Inhalation every 6 hours  melatonin 5 milliGRAM(s) Oral at bedtime  metoprolol tartrate 12.5 milliGRAM(s) Oral two times a day  sodium bicarbonate 650 milliGRAM(s) Oral three times a day  tamsulosin 0.4 milliGRAM(s) Oral at bedtime    MEDICATIONS  (PRN):  acetaminophen     Tablet .. 650 milliGRAM(s) Oral every 6 hours PRN Temp greater or equal to 38C (100.4F), Mild Pain (1 - 3)      REVIEW OF SYSTEMS:  CONSTITUTIONAL: No fever, weight loss, or fatigue  RESPIRATORY: No cough, wheezing, chills or hemoptysis; No shortness of breath  CARDIOVASCULAR: No chest pain, palpitations, dizziness, or leg swelling  GASTROINTESTINAL: No abdominal pain. No nausea, vomiting, or hematemesis; No diarrhea or constipation. No melena or hematochezia.  GENITOURINARY: No dysuria or hematuria, urinary frequency  NEUROLOGICAL: No headaches, memory loss, loss of strength, numbness, or tremors  SKIN: No itching, burning, rashes, or lesions     Vital Signs Last 24 Hrs  T(C): 36.7 (02 May 2023 07:26), Max: 36.9 (02 May 2023 04:47)  T(F): 98.1 (02 May 2023 07:26), Max: 98.4 (02 May 2023 04:47)  HR: 98 (02 May 2023 07:26) (96 - 107)  BP: 109/65 (02 May 2023 07:26) (108/71 - 126/58)  BP(mean): --  RR: 18 (02 May 2023 07:26) (17 - 18)  SpO2: 99% (02 May 2023 07:26) (92% - 100%)    Parameters below as of 02 May 2023 07:26  Patient On (Oxygen Delivery Method): room air        PHYSICAL EXAMINATION:  GENERAL: NAD, elderly frail male  HEAD:  Atraumatic, Normocephalic  EYES:  conjunctiva and sclera clear  NECK: Supple, No JVD, Normal thyroid  CHEST/LUNG: + mild bibasilar crackles. Clear to percussion bilaterally; No rales, rhonchi, wheezing, or rubs  HEART: Regular rate and rhythm; No murmurs, rubs, or gallops  ABDOMEN: Soft, Nontender, Nondistended; Bowel sounds present  NERVOUS SYSTEM:  Alert & Oriented X3  EXTREMITIES:  2+ Peripheral Pulses, No clubbing, cyanosis, or edema  SKIN: warm dry, + bajwa                        7.6    8.80  )-----------( 197      ( 02 May 2023 06:49 )             23.2     05-02    134<L>  |  107  |  48<H>  ----------------------------<  98  4.7   |  20<L>  |  3.67<H>    Ca    8.5      02 May 2023 06:49  Phos  3.8     05-02  Mg     1.7     05-02    TPro  6.4  /  Alb  2.4<L>  /  TBili  0.3  /  DBili  x   /  AST  15  /  ALT  19  /  AlkPhos  49  05-02    LIVER FUNCTIONS - ( 02 May 2023 06:49 )  Alb: 2.4 g/dL / Pro: 6.4 g/dL / ALK PHOS: 49 U/L / ALT: 19 U/L DA / AST: 15 U/L / GGT: x                   CAPILLARY BLOOD GLUCOSE      RADIOLOGY & ADDITIONAL TESTS:

## 2023-05-02 NOTE — PROGRESS NOTE ADULT - SUBJECTIVE AND OBJECTIVE BOX
Rafael Bradshaw MD (Nephrology progress note)  [address], [facility],  [address],  [facility]  TEl: [phone]  Cell: [phone]    Patient is a 87y Male seen and evaluated at bedside. Vital signs, laboratory data, imaging studies, consult notes reviewed done within past 24 hours. Overnight events noted.    Allergies    No Known Allergies    Intolerances        ROS:  CONSTITUTIONAL: No fever or chills.  HEENT: No headcahe or visual disturbances.  RESPIRATORY: No shortness of breath, cough, hemoptysis or wheezing  CARDIOVASCULAR: No Chest pain, shortness of breath, palpitations, dizziness, syncope, orthopnea, PND or leg swelling.  GASTROINTESTINAL: No abdominal pain, nausea, vomiting, diarrhea, hematemesis or blood per rectum.  GENITOURINARY: No urinary frequency, urgency, gross hematuria, dysuria, flank or supra pubic pain, difficulty passing urine.  NEUROLOGICAL: No headache, focal limb weakness, tingling or numbness or seizure like activity  SKIN: No skin rash or lesion  MUSCULOSKELETAL: No leg pain, calf pain or swelling    VITALS:    T(C): 36.7 (05-02-23 @ 07:26), Max: 36.9 (05-02-23 @ 04:47)  HR: 98 (05-02-23 @ 07:26) (96 - 107)  BP: 109/65 (05-02-23 @ 07:26) (108/71 - 126/58)  RR: 18 (05-02-23 @ 07:26) (17 - 18)  SpO2: 99% (05-02-23 @ 07:26) (92% - 100%)  CAPILLARY BLOOD GLUCOSE          05-01-23 @ 07:01  -  05-02-23 @ 07:00  --------------------------------------------------------  IN: 2180 mL / OUT: 1575 mL / NET: 605 mL    05-02-23 @ 07:01  -  05-02-23 @ 11:05  --------------------------------------------------------  IN: 118 mL / OUT: 0 mL / NET: 118 mL      MEDICATIONS  (STANDING):  budesonide  80 MICROgram(s)/formoterol 4.5 MICROgram(s) Inhaler 2 Puff(s) Inhalation two times a day  calcitriol   Capsule 0.25 MICROGram(s) Oral <User Schedule>  dextrose 5% + sodium chloride 0.45%. 1000 milliLiter(s) (60 mL/Hr) IV Continuous <Continuous>  ferrous    sulfate 325 milliGRAM(s) Oral daily  finasteride 5 milliGRAM(s) Oral daily  heparin   Injectable 5000 Unit(s) SubCutaneous every 12 hours  levalbuterol Inhalation 0.63 milliGRAM(s) Inhalation every 6 hours  melatonin 5 milliGRAM(s) Oral at bedtime  metoprolol tartrate 12.5 milliGRAM(s) Oral two times a day  sodium bicarbonate 650 milliGRAM(s) Oral three times a day  tamsulosin 0.4 milliGRAM(s) Oral at bedtime    MEDICATIONS  (PRN):  acetaminophen     Tablet .. 650 milliGRAM(s) Oral every 6 hours PRN Temp greater or equal to 38C (100.4F), Mild Pain (1 - 3)      PHYSICAL EXAM:  GENERAL: Alert, awake and oriented x3 in no distress  HEENT: MAGALYS, EOMI, neck supple, no JVP, no carotid bruit, oral mucosa moist and pink.  CHEST/LUNG: Bilateral clear breath sounds, no rales, no crepitations, no rhonchi, no wheezing  HEART: Regular rate and rhythm, CHANTAL II/VI at LPSB, no gallops, no rub   ABDOMEN: Soft, nontender, non distended, bowel sounds present, no palpable organomegaly  : No flank or supra pubic tenderness.  EXTREMITIES: Peripheral pulses are palpable, no pedal edema  Neurology: AAOx3, no focal neurological deficit  SKIN: No rash or skin lesion  Musculoskeletal: No joint deformity or spinal tenderness.      Vascular ACCESS:     LABS:                        7.6    8.80  )-----------( 197      ( 02 May 2023 06:49 )             23.2     05-02    134<L>  |  107  |  48<H>  ----------------------------<  98  4.7   |  20<L>  |  3.67<H>    Ca    8.5      02 May 2023 06:49  Phos  3.8     05-02  Mg     1.7     05-02    TPro  6.4  /  Alb  2.4<L>  /  TBili  0.3  /  DBili  x   /  AST  15  /  ALT  19  /  AlkPhos  49  05-02                RADIOLOGY & ADDITIONAL STUDIES:    Imaging Personally Reviewed:  [x] YES  [ ] NO    Consultant(s) Notes Reviewed:  [x] YES  [ ] NO    Care Discussed with Primary team/ Other Providers [x] YES  [ ] NO       Rafael Bradshaw MD (Nephrology progress note)  [address], [facility],  [address],  [facility]  TEl: [phone]  Cell: [phone]    Patient is a 87y Male seen and evaluated at bedside. Vital signs, laboratory data, imaging studies, consult notes reviewed done within past 24 hours. Overnight events noted. Patient lying in bed alert and awake in no distress wants to go home. Interval improving renal function with non oliguria with S cr 3.6. Hgb 7.6 with no active bleeding. Lomax's catheter in place    Allergies    No Known Allergies    Intolerances        ROS:  Limited  Alert and awake on nasal cannula oxygen  Denies any chest pain, SOb  Denies any urinary complains  Eating breakfast    VITALS:    T(C): 36.7 (05-02-23 @ 07:26), Max: 36.9 (05-02-23 @ 04:47)  HR: 98 (05-02-23 @ 07:26) (96 - 107)  BP: 109/65 (05-02-23 @ 07:26) (108/71 - 126/58)  RR: 18 (05-02-23 @ 07:26) (17 - 18)  SpO2: 99% (05-02-23 @ 07:26) (92% - 100%)  CAPILLARY BLOOD GLUCOSE          05-01-23 @ 07:01  -  05-02-23 @ 07:00  --------------------------------------------------------  IN: 2180 mL / OUT: 1575 mL / NET: 605 mL    05-02-23 @ 07:01  -  05-02-23 @ 11:05  --------------------------------------------------------  IN: 118 mL / OUT: 0 mL / NET: 118 mL      MEDICATIONS  (STANDING):  budesonide  80 MICROgram(s)/formoterol 4.5 MICROgram(s) Inhaler 2 Puff(s) Inhalation two times a day  calcitriol   Capsule 0.25 MICROGram(s) Oral <User Schedule>  dextrose 5% + sodium chloride 0.45%. 1000 milliLiter(s) (60 mL/Hr) IV Continuous <Continuous>  ferrous    sulfate 325 milliGRAM(s) Oral daily  finasteride 5 milliGRAM(s) Oral daily  heparin   Injectable 5000 Unit(s) SubCutaneous every 12 hours  levalbuterol Inhalation 0.63 milliGRAM(s) Inhalation every 6 hours  melatonin 5 milliGRAM(s) Oral at bedtime  metoprolol tartrate 12.5 milliGRAM(s) Oral two times a day  sodium bicarbonate 650 milliGRAM(s) Oral three times a day  tamsulosin 0.4 milliGRAM(s) Oral at bedtime    MEDICATIONS  (PRN):  acetaminophen     Tablet .. 650 milliGRAM(s) Oral every 6 hours PRN Temp greater or equal to 38C (100.4F), Mild Pain (1 - 3)      PHYSICAL EXAM:  GENERAL: Alert, awake and oriented x2-3 in no distress  HEENT: MAGALYS, EOMI, neck supple, no JVP, no carotid bruit, oral mucosa moist and pale  CHEST/LUNG: Bilateral decreased breath sounds, dry crackles  HEART: Regular rate and rhythm, CHANTAL II/VI at LPSB, no gallops, no rub   ABDOMEN: Soft, nontender, non distended, bowel sounds present, no palpable organomegaly  : No flank or supra pubic tenderness, Lomax's catheter in place  EXTREMITIES: Peripheral pulses are palpable, no pedal edema  Neurology: AAOx2-3, no focal neurological deficit  SKIN: No rash or skin lesion  Musculoskeletal: No joint deformity or spinal tenderness.      Vascular ACCESS:     LABS:                        7.6    8.80  )-----------( 197      ( 02 May 2023 06:49 )             23.2     05-02    134<L>  |  107  |  48<H>  ----------------------------<  98  4.7   |  20<L>  |  3.67<H>    Ca    8.5      02 May 2023 06:49  Phos  3.8     05-02  Mg     1.7     05-02    TPro  6.4  /  Alb  2.4<L>  /  TBili  0.3  /  DBili  x   /  AST  15  /  ALT  19  /  AlkPhos  49  05-02                RADIOLOGY & ADDITIONAL STUDIES:  rad  Imaging Personally Reviewed:  [x] YES  [ ] NO    Consultant(s) Notes Reviewed:  [x] YES  [ ] NO    Care Discussed with Primary team/ Other Providers [x] YES  [ ] NO

## 2023-05-02 NOTE — PROGRESS NOTE ADULT - PROBLEM SELECTOR PLAN 3
pt with NAGMA, similar to previous admission  HCO3 16 likely due to acute renal failure  c/w sodium bicarbonate TID and calcitriol  - improved  Nephro following Resolved.   pt with NAGMA, similar to previous admission  HCO3 16 likely due to acute renal failure  c/w sodium bicarbonate TID and calcitriol  - improved  Nephro following

## 2023-05-02 NOTE — PROGRESS NOTE ADULT - ASSESSMENT
87 year old M from home with no known PMH presents with urinary retention. Patient reports since being discharged he has had very little urine output. Denies any blood in urine or pain. Denies fever, chills, cp, sob, palpitations, lightheadedness, dizziness, n/v/d, abdominal pain, constipation.  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  5) Anemia of chronic kidney disease  6) Renal osteodystrophy  7) NAGMA  8) BPH with LUTs  9) Dysphagia      Recommend:  Strict I/o  Avoid Nephrotoxic agents  S cr downtrending to 3.6 with non oliguria  Continue  Lomax's catheter  Monitor BMP and electrolytes daily  Continue Flomax and finasteride  CT abdomen and pelvis without contrast results reviewed  Urology/pulmonary follow up  Able to tolerate po intake/Speech and swallow team follow up  Anemia panel / CKD work up reviewed  Continue Sodium bicarbonate 650 mg po tid with meals with goal serum bicarbonate>22  Continue po calcitriol 0.25 mcg po TIW  Iron studies/Hgb/Hct reviewed  Ferrous sulfate/EPO as per ordered  PSA within normal limits  Volume status and electrolytes noted  No urgent need for RRT/HD  Discharge planning per primary [place]  Out patient renal follow up on discharge  Will follow

## 2023-05-02 NOTE — PROGRESS NOTE ADULT - SUBJECTIVE AND OBJECTIVE BOX
PULMONARY CONSULT SERVICE FOLLOW-UP NOTE    INTERVAL HPI:  Reviewed chart and overnight events; patient seen and examined at bedside.    MEDICATIONS:  Pulmonary:  budesonide  80 MICROgram(s)/formoterol 4.5 MICROgram(s) Inhaler 2 Puff(s) Inhalation two times a day  levalbuterol Inhalation 0.63 milliGRAM(s) Inhalation every 6 hours    Antimicrobials:    Anticoagulants:  heparin   Injectable 5000 Unit(s) SubCutaneous every 12 hours    Cardiac:  metoprolol tartrate 12.5 milliGRAM(s) Oral two times a day    Allergies    No Known Allergies    Intolerances    Vital Signs Last 24 Hrs  T(C): 37.1 (02 May 2023 11:22), Max: 37.1 (02 May 2023 11:22)  T(F): 98.8 (02 May 2023 11:22), Max: 98.8 (02 May 2023 11:22)  HR: 104 (02 May 2023 11:22) (96 - 107)  BP: 113/53 (02 May 2023 11:22) (108/71 - 126/58)  BP(mean): --  RR: 18 (02 May 2023 11:22) (17 - 18)  SpO2: 100% (02 May 2023 11:22) (92% - 100%)    Parameters below as of 02 May 2023 11:22  Patient On (Oxygen Delivery Method): room air    05-01 @ 07:01  -  05-02 @ 07:00  --------------------------------------------------------  IN: 2180 mL / OUT: 1575 mL / NET: 605 mL    05-02 @ 07:01  -  05-02 @ 14:32  --------------------------------------------------------  IN: 118 mL / OUT: 0 mL / NET: 118 mL    PHYSICAL EXAM:  Constitutional: frail and elderly man resting semirecumbent in bed; NAD  HEENT: NC/AT; PERRL, anicteric sclera; MMM  Neck: supple  Cardiovascular: +S1/S2, RRR  Respiratory: few velcro-like crackles in mid posterior fields, globally diminished BS otherwise with good air entry; respirations overall non-labored on ambient air  Gastrointestinal: soft, NT/ND  Extremities: WWP; no edema, clubbing or cyanosis  Vascular: 2+ radial pulses B/L  Neurological: awake and alert; ROMAN    LABS:  CBC Full  -  ( 02 May 2023 06:49 )  WBC Count : 8.80 K/uL  RBC Count : 2.41 M/uL  Hemoglobin : 7.6 g/dL  Hematocrit : 23.2 %  Platelet Count - Automated : 197 K/uL  Mean Cell Volume : 96.3 fl  Mean Cell Hemoglobin : 31.5 pg  Mean Cell Hemoglobin Concentration : 32.8 gm/dL  Auto Neutrophil # : x  Auto Lymphocyte # : x  Auto Monocyte # : x  Auto Eosinophil # : x  Auto Basophil # : x  Auto Neutrophil % : x  Auto Lymphocyte % : x  Auto Monocyte % : x  Auto Eosinophil % : x  Auto Basophil % : x    05-02    134<L>  |  107  |  48<H>  ----------------------------<  98  4.7   |  20<L>  |  3.67<H>    Ca    8.5      02 May 2023 06:49  Phos  3.8     05-02  Mg     1.7     05-02    TPro  6.4  /  Alb  2.4<L>  /  TBili  0.3  /  DBili  x   /  AST  15  /  ALT  19  /  AlkPhos  49  05-02    RADIOLOGY & ADDITIONAL STUDIES: PULMONARY CONSULT SERVICE FOLLOW-UP NOTE    INTERVAL HPI:  Reviewed chart and overnight events; patient seen and examined at bedside. No new complaints this AM. Breathing is comfortable, no coughing or chest pain or dyspnea. Understands he is going for rehab now instead of straight home. Appetite is better and feels he has a bit more energy.     MEDICATIONS:  Pulmonary:  budesonide  80 MICROgram(s)/formoterol 4.5 MICROgram(s) Inhaler 2 Puff(s) Inhalation two times a day  levalbuterol Inhalation 0.63 milliGRAM(s) Inhalation every 6 hours    Antimicrobials:    Anticoagulants:  heparin   Injectable 5000 Unit(s) SubCutaneous every 12 hours    Cardiac:  metoprolol tartrate 12.5 milliGRAM(s) Oral two times a day    Allergies    No Known Allergies    Intolerances    Vital Signs Last 24 Hrs  T(C): 37.1 (02 May 2023 11:22), Max: 37.1 (02 May 2023 11:22)  T(F): 98.8 (02 May 2023 11:22), Max: 98.8 (02 May 2023 11:22)  HR: 104 (02 May 2023 11:22) (96 - 107)  BP: 113/53 (02 May 2023 11:22) (108/71 - 126/58)  BP(mean): --  RR: 18 (02 May 2023 11:22) (17 - 18)  SpO2: 100% (02 May 2023 11:22) (92% - 100%)    Parameters below as of 02 May 2023 11:22  Patient On (Oxygen Delivery Method): room air    05-01 @ 07:01 - 05-02 @ 07:00  --------------------------------------------------------  IN: 2180 mL / OUT: 1575 mL / NET: 605 mL    05-02 @ 07:01  -  05-02 @ 14:32  --------------------------------------------------------  IN: 118 mL / OUT: 0 mL / NET: 118 mL    PHYSICAL EXAM:  Constitutional: frail and elderly man resting semirecumbent in bed; NAD  HEENT: NC/AT; PERRL, anicteric sclera; MMM  Neck: supple  Cardiovascular: +S1/S2, RRR  Respiratory: few velcro-like crackles in mid posterior fields, globally diminished BS otherwise with good air entry; respirations overall non-labored on ambient air  Gastrointestinal: soft, NT/ND  Extremities: WWP; no edema, clubbing or cyanosis  Vascular: 2+ radial pulses B/L  Neurological: awake and alert; ROMAN    LABS:  CBC Full  -  ( 02 May 2023 06:49 )  WBC Count : 8.80 K/uL  RBC Count : 2.41 M/uL  Hemoglobin : 7.6 g/dL  Hematocrit : 23.2 %  Platelet Count - Automated : 197 K/uL  Mean Cell Volume : 96.3 fl  Mean Cell Hemoglobin : 31.5 pg  Mean Cell Hemoglobin Concentration : 32.8 gm/dL  Auto Neutrophil # : x  Auto Lymphocyte # : x  Auto Monocyte # : x  Auto Eosinophil # : x  Auto Basophil # : x  Auto Neutrophil % : x  Auto Lymphocyte % : x  Auto Monocyte % : x  Auto Eosinophil % : x  Auto Basophil % : x    05-02    134<L>  |  107  |  48<H>  ----------------------------<  98  4.7   |  20<L>  |  3.67<H>    Ca    8.5      02 May 2023 06:49  Phos  3.8     05-02  Mg     1.7     05-02    TPro  6.4  /  Alb  2.4<L>  /  TBili  0.3  /  DBili  x   /  AST  15  /  ALT  19  /  AlkPhos  49  05-02    RADIOLOGY & ADDITIONAL STUDIES:  No interval chest study for review

## 2023-05-02 NOTE — PROGRESS NOTE ADULT - ATTENDING COMMENTS
87M from home with PMH of pulmonary fibrosis recent ICU admission with pneumothorax and urinary retention then leaving AMA presents with urinary retention and abdominal discomfort found to have acute hyperkalemia and SANDI. S/p bajwa placement with improvement in Cr and K. Course complicated by sinus tach, suspect due to overuse of Duoneb. Pending JOSE GUADALUPE    Pt seen and examined, no new complaints     Labs reviewed- cbc, bmp, Mg, Po4     A/P:  SANDI likely ATN, obstructive, improving s/p Bajwa  Hyperkalemia   Acute urinary retention  Sinus tach- improved   Pulm fibrosis  AHRF- resolved     -sandi improved with Bajwa - appreciate urology recs - c/w finasteride, Flomax  -appreciate nephro recs  -hyperK resolved  -appreciate pulm recs: was hypoxic initially but now sat well on room air -c/w  Lev-albuterol  -sinus tachycardia suspect 2/2 frequent Duoneb use - some improvement w/  lev-albuterol and  low dose metoprolol  -dvt ppx  -PT rec JOSE GUADALUPE, pending auth

## 2023-05-02 NOTE — SWALLOW BEDSIDE ASSESSMENT ADULT - COMMENTS
Referred to service for h/o dysphagia. HOB elevated to 90°. AAOx3. Daughter present. Sparse dentition. P/w oropharyngeal dysphagia - adequate mastication for soft solid foods, mildly effortful bolus transport, clear oral cavity post swallow, 1-2 swallows per bolus on thin liquids, mildly decreased laryngeal elevation upon palpation, no overt s/s of aspiration, benefits from thin liquids via individual sips. Continues with aspiration risk.

## 2023-05-02 NOTE — PROGRESS NOTE ADULT - ASSESSMENT
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 Left AMA from ICU then returned to hospital 4/27 with urinary retention and ongoing renal failure for which he was re-admitted. Asked for mgmt recs in ILD/IPF.    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 Spiriva (son says pt has already at home) - would rx for discharge; if on standing duoneb inpatient then hold LAMA to avoid excessive muscarinic antagonist  - several risks/benefit conversations had re: antifibrotic tx Esbriet vs. Ofev; side effect profile would make it difficult for pt to tolerate especially with poor BMI and frailty, also drugs best for forestalling early disease from progression - pt and son would like to hold off starting these drugs  - continue mobilizing OOBTC daily and encouraging ambulation with PT to avoid atelectasis and deconditioning  - no acute intervention needed for pneumomediastinum and appr prior thoracic eval; PTX appears to be resolving as anticipated  - pt can f/u with me at [facility] office ([address], [phone]) in 2-4 wks or after rehab; pls ensure he has PMD follow-up as well    Cesar Chery, DO  Pulmonary & Critical Care Medicine  Available on Teams

## 2023-05-03 LAB
ALBUMIN SERPL ELPH-MCNC: 2.5 G/DL — LOW (ref 3.5–5)
ALP SERPL-CCNC: 52 U/L — SIGNIFICANT CHANGE UP (ref 40–120)
ALT FLD-CCNC: 19 U/L DA — SIGNIFICANT CHANGE UP (ref 10–60)
ANION GAP SERPL CALC-SCNC: 8 MMOL/L — SIGNIFICANT CHANGE UP (ref 5–17)
ANION GAP SERPL CALC-SCNC: 9 MMOL/L — SIGNIFICANT CHANGE UP (ref 5–17)
AST SERPL-CCNC: 16 U/L — SIGNIFICANT CHANGE UP (ref 10–40)
BILIRUB SERPL-MCNC: 0.3 MG/DL — SIGNIFICANT CHANGE UP (ref 0.2–1.2)
BUN SERPL-MCNC: 44 MG/DL — HIGH (ref 7–18)
BUN SERPL-MCNC: 45 MG/DL — HIGH (ref 7–18)
CALCIUM SERPL-MCNC: 8.9 MG/DL — SIGNIFICANT CHANGE UP (ref 8.4–10.5)
CALCIUM SERPL-MCNC: 9.1 MG/DL — SIGNIFICANT CHANGE UP (ref 8.4–10.5)
CHLORIDE SERPL-SCNC: 108 MMOL/L — SIGNIFICANT CHANGE UP (ref 96–108)
CHLORIDE SERPL-SCNC: 108 MMOL/L — SIGNIFICANT CHANGE UP (ref 96–108)
CO2 SERPL-SCNC: 18 MMOL/L — LOW (ref 22–31)
CO2 SERPL-SCNC: 20 MMOL/L — LOW (ref 22–31)
CREAT SERPL-MCNC: 3.64 MG/DL — HIGH (ref 0.5–1.3)
CREAT SERPL-MCNC: 3.86 MG/DL — HIGH (ref 0.5–1.3)
EGFR: 15 ML/MIN/1.73M2 — LOW
EGFR: 16 ML/MIN/1.73M2 — LOW
GLUCOSE BLDC GLUCOMTR-MCNC: 227 MG/DL — HIGH (ref 70–99)
GLUCOSE SERPL-MCNC: 130 MG/DL — HIGH (ref 70–99)
GLUCOSE SERPL-MCNC: 82 MG/DL — SIGNIFICANT CHANGE UP (ref 70–99)
HCT VFR BLD CALC: 25.3 % — LOW (ref 39–50)
HGB BLD-MCNC: 8.2 G/DL — LOW (ref 13–17)
MAGNESIUM SERPL-MCNC: 1.8 MG/DL — SIGNIFICANT CHANGE UP (ref 1.6–2.6)
MCHC RBC-ENTMCNC: 30.9 PG — SIGNIFICANT CHANGE UP (ref 27–34)
MCHC RBC-ENTMCNC: 32.4 GM/DL — SIGNIFICANT CHANGE UP (ref 32–36)
MCV RBC AUTO: 95.5 FL — SIGNIFICANT CHANGE UP (ref 80–100)
NRBC # BLD: 0 /100 WBCS — SIGNIFICANT CHANGE UP (ref 0–0)
PHOSPHATE SERPL-MCNC: 3.5 MG/DL — SIGNIFICANT CHANGE UP (ref 2.5–4.5)
PLATELET # BLD AUTO: 208 K/UL — SIGNIFICANT CHANGE UP (ref 150–400)
POTASSIUM SERPL-MCNC: 4.9 MMOL/L — SIGNIFICANT CHANGE UP (ref 3.5–5.3)
POTASSIUM SERPL-MCNC: 5.4 MMOL/L — HIGH (ref 3.5–5.3)
POTASSIUM SERPL-SCNC: 4.9 MMOL/L — SIGNIFICANT CHANGE UP (ref 3.5–5.3)
POTASSIUM SERPL-SCNC: 5.4 MMOL/L — HIGH (ref 3.5–5.3)
PROT SERPL-MCNC: 7 G/DL — SIGNIFICANT CHANGE UP (ref 6–8.3)
RBC # BLD: 2.65 M/UL — LOW (ref 4.2–5.8)
RBC # FLD: 13.8 % — SIGNIFICANT CHANGE UP (ref 10.3–14.5)
SARS-COV-2 RNA SPEC QL NAA+PROBE: SIGNIFICANT CHANGE UP
SODIUM SERPL-SCNC: 135 MMOL/L — SIGNIFICANT CHANGE UP (ref 135–145)
SODIUM SERPL-SCNC: 136 MMOL/L — SIGNIFICANT CHANGE UP (ref 135–145)
WBC # BLD: 8.4 K/UL — SIGNIFICANT CHANGE UP (ref 3.8–10.5)
WBC # FLD AUTO: 8.4 K/UL — SIGNIFICANT CHANGE UP (ref 3.8–10.5)

## 2023-05-03 PROCEDURE — 93970 EXTREMITY STUDY: CPT | Mod: 26

## 2023-05-03 PROCEDURE — 99232 SBSQ HOSP IP/OBS MODERATE 35: CPT | Mod: GC

## 2023-05-03 PROCEDURE — 99232 SBSQ HOSP IP/OBS MODERATE 35: CPT

## 2023-05-03 RX ORDER — OXYCODONE HYDROCHLORIDE 5 MG/1
2.5 TABLET ORAL ONCE
Refills: 0 | Status: DISCONTINUED | OUTPATIENT
Start: 2023-05-03 | End: 2023-05-03

## 2023-05-03 RX ORDER — LIDOCAINE 4 G/100G
1 CREAM TOPICAL DAILY
Refills: 0 | Status: DISCONTINUED | OUTPATIENT
Start: 2023-05-03 | End: 2023-05-08

## 2023-05-03 RX ORDER — SODIUM BICARBONATE 1 MEQ/ML
1300 SYRINGE (ML) INTRAVENOUS
Refills: 0 | Status: DISCONTINUED | OUTPATIENT
Start: 2023-05-03 | End: 2023-05-08

## 2023-05-03 RX ORDER — TIOTROPIUM BROMIDE 18 UG/1
2 CAPSULE ORAL; RESPIRATORY (INHALATION) DAILY
Refills: 0 | Status: DISCONTINUED | OUTPATIENT
Start: 2023-05-03 | End: 2023-05-07

## 2023-05-03 RX ORDER — SODIUM ZIRCONIUM CYCLOSILICATE 10 G/10G
10 POWDER, FOR SUSPENSION ORAL ONCE
Refills: 0 | Status: COMPLETED | OUTPATIENT
Start: 2023-05-03 | End: 2023-05-03

## 2023-05-03 RX ORDER — INSULIN HUMAN 100 [IU]/ML
5 INJECTION, SOLUTION SUBCUTANEOUS ONCE
Refills: 0 | Status: COMPLETED | OUTPATIENT
Start: 2023-05-03 | End: 2023-05-03

## 2023-05-03 RX ORDER — DEXTROSE 50 % IN WATER 50 %
50 SYRINGE (ML) INTRAVENOUS ONCE
Refills: 0 | Status: COMPLETED | OUTPATIENT
Start: 2023-05-03 | End: 2023-05-03

## 2023-05-03 RX ADMIN — Medication 325 MILLIGRAM(S): at 13:21

## 2023-05-03 RX ADMIN — SODIUM ZIRCONIUM CYCLOSILICATE 10 GRAM(S): 10 POWDER, FOR SUSPENSION ORAL at 09:51

## 2023-05-03 RX ADMIN — INSULIN HUMAN 5 UNIT(S): 100 INJECTION, SOLUTION SUBCUTANEOUS at 13:16

## 2023-05-03 RX ADMIN — TAMSULOSIN HYDROCHLORIDE 0.4 MILLIGRAM(S): 0.4 CAPSULE ORAL at 21:49

## 2023-05-03 RX ADMIN — TIOTROPIUM BROMIDE 2 PUFF(S): 18 CAPSULE ORAL; RESPIRATORY (INHALATION) at 13:38

## 2023-05-03 RX ADMIN — BUDESONIDE AND FORMOTEROL FUMARATE DIHYDRATE 2 PUFF(S): 160; 4.5 AEROSOL RESPIRATORY (INHALATION) at 21:52

## 2023-05-03 RX ADMIN — Medication 50 MILLILITER(S): at 13:04

## 2023-05-03 RX ADMIN — LEVALBUTEROL 0.63 MILLIGRAM(S): 1.25 SOLUTION, CONCENTRATE RESPIRATORY (INHALATION) at 16:00

## 2023-05-03 RX ADMIN — LIDOCAINE 1 PATCH: 4 CREAM TOPICAL at 13:04

## 2023-05-03 RX ADMIN — Medication 1300 MILLIGRAM(S): at 18:26

## 2023-05-03 RX ADMIN — LIDOCAINE 1 PATCH: 4 CREAM TOPICAL at 22:08

## 2023-05-03 RX ADMIN — LEVALBUTEROL 0.63 MILLIGRAM(S): 1.25 SOLUTION, CONCENTRATE RESPIRATORY (INHALATION) at 03:10

## 2023-05-03 RX ADMIN — Medication 5 MILLIGRAM(S): at 21:48

## 2023-05-03 RX ADMIN — BUDESONIDE AND FORMOTEROL FUMARATE DIHYDRATE 2 PUFF(S): 160; 4.5 AEROSOL RESPIRATORY (INHALATION) at 09:53

## 2023-05-03 RX ADMIN — LEVALBUTEROL 0.63 MILLIGRAM(S): 1.25 SOLUTION, CONCENTRATE RESPIRATORY (INHALATION) at 08:46

## 2023-05-03 RX ADMIN — LEVALBUTEROL 0.63 MILLIGRAM(S): 1.25 SOLUTION, CONCENTRATE RESPIRATORY (INHALATION) at 20:03

## 2023-05-03 RX ADMIN — Medication 1300 MILLIGRAM(S): at 09:53

## 2023-05-03 RX ADMIN — CALCITRIOL 0.25 MICROGRAM(S): 0.5 CAPSULE ORAL at 09:51

## 2023-05-03 RX ADMIN — FINASTERIDE 5 MILLIGRAM(S): 5 TABLET, FILM COATED ORAL at 13:22

## 2023-05-03 NOTE — DISCHARGE NOTE NURSING/CASE MANAGEMENT/SOCIAL WORK - NSDCPEFALRISK_GEN_ALL_CORE
For information on Fall & Injury Prevention, visit: [email or web address] OR  [email or web address] OR  https://www.cdc.gov/steadi/patient.html

## 2023-05-03 NOTE — CHART NOTE - NSCHARTNOTEFT_GEN_A_CORE
Assessment:     Factors impacting intake: [ ] none [ ] nausea  [ ] vomiting [ ] diarrhea [ ] constipation  [ ]chewing problems [ ] swallowing issues  [ ] other:     Diet Presciption:   Intake:     Daily Weight in k.4 (2023 04:34)  Weight in k.5 (2023 04:33)    % Weight Change    Pertinent Medications: MEDICATIONS  (STANDING):  budesonide  80 MICROgram(s)/formoterol 4.5 MICROgram(s) Inhaler 2 Puff(s) Inhalation two times a day  calcitriol   Capsule 0.25 MICROGram(s) Oral <User Schedule>  dextrose 5% + sodium chloride 0.45%. 1000 milliLiter(s) (60 mL/Hr) IV Continuous <Continuous>  ferrous    sulfate 325 milliGRAM(s) Oral daily  finasteride 5 milliGRAM(s) Oral daily  heparin   Injectable 5000 Unit(s) SubCutaneous every 12 hours  levalbuterol Inhalation 0.63 milliGRAM(s) Inhalation every 6 hours  melatonin 5 milliGRAM(s) Oral at bedtime  metoprolol tartrate 12.5 milliGRAM(s) Oral two times a day  sodium bicarbonate 650 milliGRAM(s) Oral three times a day  tamsulosin 0.4 milliGRAM(s) Oral at bedtime    MEDICATIONS  (PRN):  acetaminophen     Tablet .. 650 milliGRAM(s) Oral every 6 hours PRN Temp greater or equal to 38C (100.4F), Mild Pain (1 - 3)    Pertinent Labs: 05 Na135 mmol/L Glu 82 mg/dL K+ 5.4 mmol/L<H> Cr  3.64 mg/dL<H> BUN 45 mg/dL<H> - Phos 3.5 mg/dL 05- Alb 2.5 g/dL<L>     CAPILLARY BLOOD GLUCOSE          Skin:     Estimated Needs:   [ ] no change since previous assessment  [ ] recalculated:     Previous Nutrition Diagnosis:   [ ] Inadequate Energy Intake [ ]Inadequate Oral Intake [ ] Excessive Energy Intake   [ ] Underweight [ ] Increased Nutrient Needs [ ] Overweight/Obesity  [ ] Swallowing Difficult   [ ] Altered GI Function [ ] Unintended Weight Loss [ ] Food & Nutrition Related Knowledge Deficit [ ] Malnutrition   [ ] Not Ready for Diet/Life Style Changes     Nutrition Diagnosis is [ ] ongoing  [ ] Improving   [ ] resolved [ ] not applicable     New Nutrition Diagnosis: [ ] not applicable       Interventions:   Recommend  [ ] Change Diet To:  [ ] Nutrition Supplement  [ ] Nutrition Support  [ ] Other:     Monitoring and Evaluation:   [ ] PO intake [ x ] Tolerance to diet prescription [ x ] weights [ x ] labs[ x ] follow up per protocol  [ ] other: Assessment:        Nutrition consult requested by RN for food/nutrition related issues. Chart reviewed, pt visited, alert, oriented, cachectic, hard of hearing, but well-communicated, diet upgraded to Minced and Moist food, Thin fluid per Swallow evaluation 23; <25% breakfast intake observed, dislikes food served, pt wants family to bring puree type of food from home. Attemept made to contact son ( Tremaine at [phone]), not available at present; Per RN, swallow problem reported, coughing after thin fluid; Discussed with MD, Swallow evaluation recommended; Pending discharge planning to skilled nursing facility for rehab when medically ready per team      Factors impacting intake:  [ X ] other: difficulty chewing; difficulty swallowing; difficulty with food procurement/preparation; Yazidism/ethnic/cultural/personal food preferences; acute on chronic comorbidities       Factors impacting intake: [ ] none [ ] nausea  [ ] vomiting [ ] diarrhea [ ] constipation  [ ]chewing problems [ ] swallowing issues  [ ] other:     Diet Prescription:   Intake:     Daily Weight in k.4 (2023 04:34)  Weight in k.5 (2023 04:33)    % Weight Change    Pertinent Medications: MEDICATIONS  (STANDING):  budesonide  80 MICROgram(s)/formoterol 4.5 MICROgram(s) Inhaler 2 Puff(s) Inhalation two times a day  calcitriol   Capsule 0.25 MICROGram(s) Oral <User Schedule>  dextrose 5% + sodium chloride 0.45%. 1000 milliLiter(s) (60 mL/Hr) IV Continuous <Continuous>  ferrous    sulfate 325 milliGRAM(s) Oral daily  finasteride 5 milliGRAM(s) Oral daily  heparin   Injectable 5000 Unit(s) SubCutaneous every 12 hours  levalbuterol Inhalation 0.63 milliGRAM(s) Inhalation every 6 hours  melatonin 5 milliGRAM(s) Oral at bedtime  metoprolol tartrate 12.5 milliGRAM(s) Oral two times a day  sodium bicarbonate 650 milliGRAM(s) Oral three times a day  tamsulosin 0.4 milliGRAM(s) Oral at bedtime    MEDICATIONS  (PRN):  acetaminophen     Tablet .. 650 milliGRAM(s) Oral every 6 hours PRN Temp greater or equal to 38C (100.4F), Mild Pain (1 - 3)    Pertinent Labs: 05-03 Na135 mmol/L Glu 82 mg/dL K+ 5.4 mmol/L<H> Cr  3.64 mg/dL<H> BUN 45 mg/dL<H>  Phos 3.5 mg/dL  Alb 2.5 g/dL<L>     CAPILLARY BLOOD GLUCOSE        Skin: skin intact     Estimated Needs:   [ X ] no change since previous assessment  [ ] recalculated:     Previous Nutrition Diagnosis:   [ X ] Malnutrition ( SEVERE)     Nutrition Diagnosis is [ X ] ongoing  [ ] Improving   [ ] resolved [ ] not applicable     New Nutrition Diagnosis: [ X ] not applicable     Interventions:   Recommend  [ X ] Diet changed by MD for now to Puree, Thin fluid with oral nutritional supplement until Swallow evaluation   [ ] Nutrition Supplement  [ ] Nutrition Support  [ X ] Other: Discussed with MD/RN; Pending Swallow evaluation                    Nursing to continue feeding assistance and encouragement as needed, aspiration precaution                    Provide food choices within diet Rx as available/updated     Monitoring and Evaluation:   [ X ] PO intake [ x ] Tolerance to diet prescription [ x ] weights [ x ] labs[ x ] follow up per protocol  [ ] other: Assessment:        Nutrition consult requested by RN for food/nutrition related issues. Chart reviewed, pt visited, alert, oriented, cachectic, hard of hearing, but well-communicated, diet upgraded to Minced and Moist food, Thin fluid per Swallow evaluation 23, <25% breakfast intake observed, "Service Recovery" provided, tolerating thin fluid, but dislikes food served, pt wants family to bring puree type of food from home. Attempt made to contact son ( Tremaine at [phone]), not available at present. discussed with RN; Pending discharge planning to skilled nursing facility for rehab when medically ready per team      Factors impacting intake:  [ X ] other: difficulty chewing; difficulty swallowing; difficulty with food procurement/preparation; Jewish/ethnic/cultural/personal food preferences; acute on chronic comorbidities     Diet Prescription: Mince and Moist, Ensure Pudding 1 can tid     Intake: see above     Daily Weight in k.4 (2023 04:34)  Weight in k.5 (2023 04:33)    % Weight Change    Pertinent Medications: MEDICATIONS  (STANDING):  budesonide  80 MICROgram(s)/formoterol 4.5 MICROgram(s) Inhaler 2 Puff(s) Inhalation two times a day  calcitriol   Capsule 0.25 MICROGram(s) Oral <User Schedule>  dextrose 5% + sodium chloride 0.45%. 1000 milliLiter(s) (60 mL/Hr) IV Continuous <Continuous>  ferrous    sulfate 325 milliGRAM(s) Oral daily  finasteride 5 milliGRAM(s) Oral daily  heparin   Injectable 5000 Unit(s) SubCutaneous every 12 hours  levalbuterol Inhalation 0.63 milliGRAM(s) Inhalation every 6 hours  melatonin 5 milliGRAM(s) Oral at bedtime  metoprolol tartrate 12.5 milliGRAM(s) Oral two times a day  sodium bicarbonate 650 milliGRAM(s) Oral three times a day  tamsulosin 0.4 milliGRAM(s) Oral at bedtime    MEDICATIONS  (PRN):  acetaminophen     Tablet .. 650 milliGRAM(s) Oral every 6 hours PRN Temp greater or equal to 38C (100.4F), Mild Pain (1 - 3)    Pertinent Labs: 05- Na135 mmol/L Glu 82 mg/dL K+ 5.4 mmol/L<H> Cr  3.64 mg/dL<H> BUN 45 mg/dL<H> 05-03 Phos 3.5 mg/dL 05-03 Alb 2.5 g/dL<L>     CAPILLARY BLOOD GLUCOSE    Skin: skin intact     Estimated Needs:   [ X ] no change since previous assessment  [ ] recalculated:     Previous Nutrition Diagnosis:   [ X ] Malnutrition ( SEVERE)     Nutrition Diagnosis is [ X ] ongoing  [ ] Improving   [ ] resolved [ ] not applicable     New Nutrition Diagnosis: [ X ] not applicable     Interventions:   Recommend  [ X ] Continue diet Rx as ordered   [ ] Nutrition Supplement  [ ] Nutrition Support  [ X ] Other: Discussed with RN                   Nursing to continue feeding assistance and encouragement as needed, aspiration precaution                    Provide food choices within diet Rx as available/updated                    Pt to be followed by dietitian at skilled nursing facility when medically ready to be discharged     Monitoring and Evaluation:   [ X ] PO intake [ x ] Tolerance to diet prescription [ x ] weights [ x ] labs[ x ] follow up per protocol  [ ] other:

## 2023-05-03 NOTE — PROGRESS NOTE ADULT - ASSESSMENT
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 Left AMA from ICU then returned to hospital 4/27 with urinary retention and ongoing renal failure for which he was re-admitted. Asked for mgmt recs in ILD/IPF.    #ILD most c/w radiographic UIP/IPF pattern  #Pneumothorax, likely spontaneous; resolving  #Pneumomediastinum; resolving    Recommendations:  - assess O2 needs  - would d/c bronchodilators given tachy and no clinical indication  - outpt pulm f/u

## 2023-05-03 NOTE — PROGRESS NOTE ADULT - SUBJECTIVE AND OBJECTIVE BOX
PULMONARY CONSULT SERVICE FOLLOW-UP NOTE    INTERVAL HPI:  Reviewed chart and overnight events; patient seen and examined at bedside. No new complaints this AM. Breathing is comfortable, no coughing or chest pain or dyspnea.     MEDICATIONS:  MEDICATIONS  (STANDING):  budesonide  80 MICROgram(s)/formoterol 4.5 MICROgram(s) Inhaler 2 Puff(s) Inhalation two times a day  calcitriol   Capsule 0.25 MICROGram(s) Oral <User Schedule>  ferrous    sulfate 325 milliGRAM(s) Oral daily  finasteride 5 milliGRAM(s) Oral daily  heparin   Injectable 5000 Unit(s) SubCutaneous every 12 hours  levalbuterol Inhalation 0.63 milliGRAM(s) Inhalation every 6 hours  lidocaine   4% Patch 1 Patch Transdermal daily  melatonin 5 milliGRAM(s) Oral at bedtime  metoprolol tartrate 12.5 milliGRAM(s) Oral two times a day  sodium bicarbonate 1300 milliGRAM(s) Oral two times a day  tamsulosin 0.4 milliGRAM(s) Oral at bedtime  tiotropium 2.5 MICROgram(s) Inhaler 2 Puff(s) Inhalation daily    MEDICATIONS  (PRN):  acetaminophen     Tablet .. 650 milliGRAM(s) Oral every 6 hours PRN Temp greater or equal to 38C (100.4F), Mild Pain (1 - 3)    Allergies    No Known Allergies    Intolerances    Vital Signs Last 24 Hrs  Vital Signs Last 24 Hrs  T(C): 36.9 (03 May 2023 16:01), Max: 36.9 (03 May 2023 16:01)  T(F): 98.4 (03 May 2023 16:01), Max: 98.4 (03 May 2023 16:01)  HR: 95 (03 May 2023 16:01) (95 - 106)  BP: 95/48 (03 May 2023 16:01) (95/48 - 129/58)  BP(mean): 67 (03 May 2023 05:23) (67 - 67)  RR: 18 (03 May 2023 16:01) (17 - 18)  SpO2: 100% (03 May 2023 16:01) (98% - 100%)    Parameters below as of 03 May 2023 16:01  Patient On (Oxygen Delivery Method): room air    mL    PHYSICAL EXAM:  Constitutional: frail and elderly man resting semirecumbent in bed; NAD  HEENT: NC/AT; PERRL, anicteric sclera; MMM  Neck: supple  Cardiovascular: +S1/S2, RRR  Respiratory: few velcro-like crackles in mid posterior fields, globally diminished BS otherwise with good air entry; respirations overall non-labored on ambient air  Gastrointestinal: soft, NT/ND  Extremities: WWP; no edema, clubbing or cyanosis  Vascular: 2+ radial pulses B/L  Neurological: awake and alert; ROMAN    LABS:                        8.2    8.40  )-----------( 208      ( 03 May 2023 05:55 )             25.3   05-03    136  |  108  |  44<H>  ----------------------------<  130<H>  4.9   |  20<L>  |  3.86<H>    Ca    9.1      03 May 2023 14:49  Phos  3.5     05-03  Mg     1.8     05-03    TPro  7.0  /  Alb  2.5<L>  /  TBili  0.3  /  DBili  x   /  AST  16  /  ALT  19  /  AlkPhos  52  05-03      RADIOLOGY & ADDITIONAL STUDIES:  No interval chest study for review

## 2023-05-03 NOTE — DISCHARGE NOTE NURSING/CASE MANAGEMENT/SOCIAL WORK - PATIENT PORTAL LINK FT
You can access the FollowMyHealth Patient Portal offered by [facility] by registering at the following website: [email or web address]. By joining [facility]’s FollowMyHealth portal, you will also be able to view your health information using other applications (apps) compatible with our system.

## 2023-05-03 NOTE — PROGRESS NOTE ADULT - ASSESSMENT
87 year old M from home with no known PMH presents with urinary retention. Patient reports since being discharged he has had very little urine output. Denies any blood in urine or pain. Denies fever, chills, cp, sob, palpitations, lightheadedness, dizziness, n/v/d, abdominal pain, constipation.  Nephrology consult called for SANDI    Assessment:  1) Non oliguric SANDI with probable underlying CKD with improving renal function with S cr 3.6 likely pre-renal/dehydration/ ATN/Obstructive uropathy  2) Hyperkalemia with K level 5.4  3) Acute urinary retention with BPH with LUTs  4) History of hypoxic respiratory failure/ Intersitial lung disease on nasal cannula oxygen  5) Anemia of chronic kidney disease  6) Renal osteodystrophy  7) NAGMA  8) BPH with LUTs  9) Dysphagia      Recommend:  Strict I/o  Avoid Nephrotoxic agents  S cr downtrending to 3.6 with non oliguria  K level 5.4  Lokelma 10 mg po  Low K/Low phos renal diet  Continue  Lomax's catheter  Monitor BMP and electrolytes daily  Continue Flomax and finasteride  CT abdomen and pelvis without contrast results reviewed  Urology/pulmonary follow up  Able to tolerate po intake/Speech and swallow team follow up  Anemia panel / CKD work up reviewed  Increase sodium bicarbonate 1300 mg po bid with meals with goal serum bicarbonate>22  Continue po calcitriol 0.25 mcg po TIW  Iron studies/Hgb/Hct reviewed  Ferrous sulfate/EPO as per ordered  PSA within normal limits  Volume status and electrolytes noted  No urgent need for RRT/HD  Discharge planning per primary team  Out patient renal follow up on discharge  Will follow

## 2023-05-03 NOTE — PROGRESS NOTE ADULT - SUBJECTIVE AND OBJECTIVE BOX
Rafael Bradshaw MD (Nephrology progress note)  [address], [facility],  [id number],  [id number]  TEl: [phone]  Cell: [phone]    Patient is a 87y Male seen and evaluated at bedside. Vital signs, laboratory data, imaging studies, consult notes reviewed done within past 24 hours. Overnight events noted. Patient lying in bed alert and awake in no distress. Interval improving renal function with non oliguria with S cr 3.5. K level 5.4    Allergies    No Known Allergies    Intolerances        ROS:  Limited  Alert and awake in no distress  Denies any chest pain, SOB  Able to tolerate po intake      VITALS:    T(C): 36.6 (05-03-23 @ 07:49), Max: 37.1 (05-02-23 @ 11:22)  HR: 100 (05-03-23 @ 07:49) (98 - 106)  BP: 129/58 (05-03-23 @ 07:49) (111/48 - 129/58)  RR: 18 (05-03-23 @ 07:49) (17 - 18)  SpO2: 99% (05-03-23 @ 07:49) (98% - 100%)  CAPILLARY BLOOD GLUCOSE          05-02-23 @ 07:01  -  05-03-23 @ 07:00  --------------------------------------------------------  IN: 436 mL / OUT: 1600 mL / NET: -1164 mL      MEDICATIONS  (STANDING):  budesonide  80 MICROgram(s)/formoterol 4.5 MICROgram(s) Inhaler 2 Puff(s) Inhalation two times a day  calcitriol   Capsule 0.25 MICROGram(s) Oral <User Schedule>  ferrous    sulfate 325 milliGRAM(s) Oral daily  finasteride 5 milliGRAM(s) Oral daily  heparin   Injectable 5000 Unit(s) SubCutaneous every 12 hours  levalbuterol Inhalation 0.63 milliGRAM(s) Inhalation every 6 hours  melatonin 5 milliGRAM(s) Oral at bedtime  metoprolol tartrate 12.5 milliGRAM(s) Oral two times a day  sodium bicarbonate 1300 milliGRAM(s) Oral two times a day  tamsulosin 0.4 milliGRAM(s) Oral at bedtime  tiotropium 2.5 MICROgram(s) Inhaler 2 Puff(s) Inhalation daily    MEDICATIONS  (PRN):  acetaminophen     Tablet .. 650 milliGRAM(s) Oral every 6 hours PRN Temp greater or equal to 38C (100.4F), Mild Pain (1 - 3)      PHYSICAL EXAM:  GENERAL: Alert, awake and oriented x2-3 in no distress  HEENT: MAGALYS, EOMI, neck supple, no JVP  CHEST/LUNG: Bilateral clear breath sounds, no rales, no crepitations, no rhonchi, no wheezing  HEART: Regular rate and rhythm, CHANTAL II/VI at LPSB, no gallops, no rub   ABDOMEN: Soft, nontender, non distended, bowel sounds present, no palpable organomegaly  : No flank or supra pubic tenderness, Lomax's cathter in place  EXTREMITIES: Peripheral pulses are palpable, no pedal edema  Neurology: AAOx2-3, no focal neurological deficit  SKIN: No rash or skin lesion  Musculoskeletal: No joint deformity or spinal tenderness.      Vascular ACCESS: None    LABS:                        8.2    8.40  )-----------( 208      ( 03 May 2023 05:55 )             25.3     05-03    135  |  108  |  45<H>  ----------------------------<  82  5.4<H>   |  18<L>  |  3.64<H>    Ca    8.9      03 May 2023 05:55  Phos  3.5     05-03  Mg     1.8     05-03    TPro  7.0  /  Alb  2.5<L>  /  TBili  0.3  /  DBili  x   /  AST  16  /  ALT  19  /  AlkPhos  52  05-03                RADIOLOGY & ADDITIONAL STUDIES:    Imaging Personally Reviewed:  [x] YES  [ ] NO    Consultant(s) Notes Reviewed:  [x] YES  [ ] NO    Care Discussed with Primary team/ Other Providers [x] YES  [ ] NO

## 2023-05-03 NOTE — PROGRESS NOTE ADULT - SUBJECTIVE AND OBJECTIVE BOX
PGY-1 Progress Note discussed with attending    PAGER #: [[phone]] TILL 5:00 PM  PLEASE CONTACT ON CALL TEAM:   - On Call Team (Please refer to Dustin) FROM 5:00 PM - 8:30PM  - Nightfloat Team FROM 8:30 -7:30 AM    INTERVAL HPI/OVERNIGHT EVENTS:       REVIEW OF SYSTEMS:  CONSTITUTIONAL: No fever, weight loss, or fatigue  RESPIRATORY: No cough, wheezing, chills or hemoptysis; No shortness of breath  CARDIOVASCULAR: No chest pain, palpitations, dizziness, or leg swelling  GASTROINTESTINAL: No abdominal pain. No nausea, vomiting, or hematemesis; No diarrhea or constipation. No melena or hematochezia.  GENITOURINARY: No dysuria or hematuria, urinary frequency  NEUROLOGICAL: No headaches, memory loss, loss of strength, numbness, or tremors  SKIN: No itching, burning, rashes, or lesions     MEDICATIONS  (STANDING):  budesonide  80 MICROgram(s)/formoterol 4.5 MICROgram(s) Inhaler 2 Puff(s) Inhalation two times a day  calcitriol   Capsule 0.25 MICROGram(s) Oral <User Schedule>  ferrous    sulfate 325 milliGRAM(s) Oral daily  finasteride 5 milliGRAM(s) Oral daily  heparin   Injectable 5000 Unit(s) SubCutaneous every 12 hours  levalbuterol Inhalation 0.63 milliGRAM(s) Inhalation every 6 hours  lidocaine   4% Patch 1 Patch Transdermal daily  melatonin 5 milliGRAM(s) Oral at bedtime  metoprolol tartrate 12.5 milliGRAM(s) Oral two times a day  sodium bicarbonate 1300 milliGRAM(s) Oral two times a day  tamsulosin 0.4 milliGRAM(s) Oral at bedtime  tiotropium 2.5 MICROgram(s) Inhaler 2 Puff(s) Inhalation daily    MEDICATIONS  (PRN):  acetaminophen     Tablet .. 650 milliGRAM(s) Oral every 6 hours PRN Temp greater or equal to 38C (100.4F), Mild Pain (1 - 3)      Vital Signs Last 24 Hrs  T(C): 36.7 (03 May 2023 11:32), Max: 37.1 (02 May 2023 15:40)  T(F): 98.1 (03 May 2023 11:32), Max: 98.8 (02 May 2023 15:40)  HR: 104 (03 May 2023 11:32) (98 - 106)  BP: 117/59 (03 May 2023 11:32) (111/48 - 129/58)  BP(mean): 67 (03 May 2023 05:23) (67 - 67)  RR: 18 (03 May 2023 11:32) (17 - 18)  SpO2: 100% (03 May 2023 11:32) (98% - 100%)    Parameters below as of 03 May 2023 11:32  Patient On (Oxygen Delivery Method): room air        PHYSICAL EXAMINATION:  GENERAL: NAD, well built  HEAD:  Atraumatic, Normocephalic  EYES:  conjunctiva and sclera clear  NECK: Supple, No JVD, Normal thyroid  CHEST/LUNG: Clear to auscultation. Clear to percussion bilaterally; No rales, rhonchi, wheezing, or rubs  HEART: Regular rate and rhythm; No murmurs, rubs, or gallops  ABDOMEN: Soft, Nontender, Nondistended; Bowel sounds present  NERVOUS SYSTEM:  Alert & Oriented X3,    EXTREMITIES:  2+ Peripheral Pulses, No clubbing, cyanosis, or edema  SKIN: warm dry                          8.2    8.40  )-----------( 208      ( 03 May 2023 05:55 )             25.3     05-03    135  |  108  |  45<H>  ----------------------------<  82  5.4<H>   |  18<L>  |  3.64<H>    Ca    8.9      03 May 2023 05:55  Phos  3.5     05-03  Mg     1.8     05-03    TPro  7.0  /  Alb  2.5<L>  /  TBili  0.3  /  DBili  x   /  AST  16  /  ALT  19  /  AlkPhos  52  05-03    LIVER FUNCTIONS - ( 03 May 2023 05:55 )  Alb: 2.5 g/dL / Pro: 7.0 g/dL / ALK PHOS: 52 U/L / ALT: 19 U/L DA / AST: 16 U/L / GGT: x                       I&O's Summary    02 May 2023 07:01  -  03 May 2023 07:00  --------------------------------------------------------  IN: 436 mL / OUT: 1600 mL / NET: -1164 mL    03 May 2023 07:01  -  03 May 2023 14:51  --------------------------------------------------------  IN: 0 mL / OUT: 350 mL / NET: -350 mL        CAPILLARY BLOOD GLUCOSE      POCT Blood Glucose.: 227 mg/dL (03 May 2023 13:07)    CAPILLARY BLOOD GLUCOSE      POCT Blood Glucose.: 227 mg/dL (03 May 2023 13:07)      RADIOLOGY & ADDITIONAL TESTS:                   PGY-1 Progress Note discussed with attending    PAGER #: [[phone]] TILL 5:00 PM  PLEASE CONTACT ON CALL TEAM:   - On Call Team (Please refer to Dustin) FROM 5:00 PM - 8:30PM  - Nightfloat Team FROM 8:30 -7:30 AM    INTERVAL HPI/OVERNIGHT EVENTS:   Overnight, pt complaining of severe low back pain and requesting "oxycodone 5mg." Patient has been resolving most meds, including sodium bicarb, metoprolol, heparin, Flomax and lokelma. Counseled patient on taking his medications as prescribed. Counseled on risk of opiod use and recommended lidocaine patch and tylenol as needed.     REVIEW OF SYSTEMS:  CONSTITUTIONAL: No fever, weight loss, + fatigue  RESPIRATORY: No cough, wheezing, chills or hemoptysis; + shortness of breath, improved with inhalers  CARDIOVASCULAR: No chest pain, palpitations, dizziness, or leg swelling  GASTROINTESTINAL: No abdominal pain. No nausea, vomiting, or hematemesis; No diarrhea or constipation. No melena or hematochezia.  GENITOURINARY: No dysuria or hematuria, urinary frequency  NEUROLOGICAL: No headaches, memory loss, loss of strength, numbness, or tremors  SKIN: No itching, burning, rashes, or lesions     MEDICATIONS  (STANDING):  budesonide  80 MICROgram(s)/formoterol 4.5 MICROgram(s) Inhaler 2 Puff(s) Inhalation two times a day  calcitriol   Capsule 0.25 MICROGram(s) Oral <User Schedule>  ferrous    sulfate 325 milliGRAM(s) Oral daily  finasteride 5 milliGRAM(s) Oral daily  heparin   Injectable 5000 Unit(s) SubCutaneous every 12 hours  levalbuterol Inhalation 0.63 milliGRAM(s) Inhalation every 6 hours  lidocaine   4% Patch 1 Patch Transdermal daily  melatonin 5 milliGRAM(s) Oral at bedtime  metoprolol tartrate 12.5 milliGRAM(s) Oral two times a day  sodium bicarbonate 1300 milliGRAM(s) Oral two times a day  tamsulosin 0.4 milliGRAM(s) Oral at bedtime  tiotropium 2.5 MICROgram(s) Inhaler 2 Puff(s) Inhalation daily    MEDICATIONS  (PRN):  acetaminophen     Tablet .. 650 milliGRAM(s) Oral every 6 hours PRN Temp greater or equal to 38C (100.4F), Mild Pain (1 - 3)      Vital Signs Last 24 Hrs  T(C): 36.7 (03 May 2023 11:32), Max: 37.1 (02 May 2023 15:40)  T(F): 98.1 (03 May 2023 11:32), Max: 98.8 (02 May 2023 15:40)  HR: 104 (03 May 2023 11:32) (98 - 106)  BP: 117/59 (03 May 2023 11:32) (111/48 - 129/58)  BP(mean): 67 (03 May 2023 05:23) (67 - 67)  RR: 18 (03 May 2023 11:32) (17 - 18)  SpO2: 100% (03 May 2023 11:32) (98% - 100%)    Parameters below as of 03 May 2023 11:32  Patient On (Oxygen Delivery Method): room air        PHYSICAL EXAMINATION:  GENERAL: NAD, elderly frail male  HEAD:  Atraumatic, Normocephalic  EYES:  conjunctiva and sclera clear  NECK: Supple, No JVD, Normal thyroid  CHEST/LUNG: + mild bibasilar crackles. Clear to percussion bilaterally; No rales, rhonchi, wheezing, or rubs  HEART: Regular rate and rhythm; No murmurs, rubs, or gallops  ABDOMEN: Soft, Nontender, Nondistended; Bowel sounds present  NERVOUS SYSTEM:  Alert & Oriented X3  EXTREMITIES:  2+ Peripheral Pulses, No clubbing, cyanosis, or edema  SKIN: warm dry, + bajwa                          8.2    8.40  )-----------( 208      ( 03 May 2023 05:55 )             25.3     05-03    135  |  108  |  45<H>  ----------------------------<  82  5.4<H>   |  18<L>  |  3.64<H>    Ca    8.9      03 May 2023 05:55  Phos  3.5     05-03  Mg     1.8     05-03    TPro  7.0  /  Alb  2.5<L>  /  TBili  0.3  /  DBili  x   /  AST  16  /  ALT  19  /  AlkPhos  52  05-03    LIVER FUNCTIONS - ( 03 May 2023 05:55 )  Alb: 2.5 g/dL / Pro: 7.0 g/dL / ALK PHOS: 52 U/L / ALT: 19 U/L DA / AST: 16 U/L / GGT: x                       I&O's Summary    02 May 2023 07:01  -  03 May 2023 07:00  --------------------------------------------------------  IN: 436 mL / OUT: 1600 mL / NET: -1164 mL    03 May 2023 07:01  -  03 May 2023 14:51  --------------------------------------------------------  IN: 0 mL / OUT: 350 mL / NET: -350 mL        CAPILLARY BLOOD GLUCOSE  POCT Blood Glucose.: 227 mg/dL (03 May 2023 13:07)    CAPILLARY BLOOD GLUCOSE  POCT Blood Glucose.: 227 mg/dL (03 May 2023 13:07)

## 2023-05-03 NOTE — PROGRESS NOTE ADULT - PROBLEM SELECTOR PLAN 5
CT shows redemonstrated pneumomediastinum, continued findings of diffuse lung fibrosis with peripheral/basilar reticular opacities and honeycombing.   CT Surgery recommended no acute intervention last admission  c/w Spiriva, Symbicort  C/w on Levalbuterol due to increased sinus tachycardia on albuterol  saturating well on room air  Dr. Miri Mcclure following CT shows redemonstrated pneumomediastinum, continued findings of diffuse lung fibrosis with peripheral/basilar reticular opacities and honeycombing.   CT Surgery recommended no acute intervention last admission  c/w Spiriva, Symbicort  C/w Levalbuterol  saturating well on room air  Dr. Miri Mcclure following

## 2023-05-03 NOTE — PROGRESS NOTE ADULT - PROBLEM SELECTOR PLAN 4
pt with sinus tachy to 140s on tele  likely 2/2 to albuterol/duonebs  start levalbuterol  started metoprolol 12.5mg BID pt with sinus tachy to 140s on tele  possibly 2/2 to albuterol/duonebs  start levalbuterol  started metoprolol 12.5mg BID, however patient has been refusing

## 2023-05-03 NOTE — PROGRESS NOTE ADULT - ASSESSMENT
87 year old M from home with PMH of pulmonary fibrosis, pertinent recent admission to [facility] ICU (4/23-4/26) for close monitoring. CT show pulmonary fibrosis with pneumomediastinum and small right apical PTX . He was initially started on BiPaP changed to NC. NAGMA due to SANDI. Pt left AMA. Pt presents with urinary retention and acute hyperkalemia admitted to telemetry for management of hyperkalemia and SANDI.

## 2023-05-03 NOTE — PROGRESS NOTE ADULT - ATTENDING COMMENTS
87M from home with PMH of pulmonary fibrosis recent ICU admission with pneumothorax and urinary retention then leaving AMA presents with urinary retention and abdominal discomfort found to have acute hyperkalemia and SANDI. S/p bajwa placement with improvement in Cr and K. Course complicated by sinus tach, suspect due to overuse of Duoneb. Pending JOSE GUADALUPE    Pt seen and examined, no new complaints.     Labs reviewed- cbc, bmp, Mg, Po4 , lfts     A/P:  SANDI likely ATN, obstructive, improving s/p Bajwa  Hyperkalemia   NAGMA   Acute urinary retention  Sinus tach- improved   Pulm fibrosis  AHRF- resolved     -SANDI improving s/p Bajwa w/ decent urine out-put.   -Noted w/ hyperkalemia, s/p Lokelma- Repeat BMP w/ improved K+   -CO2 dropped to 19, patient is not taking his medications. Patient counselled extensively on importance of taking his meds. He requested his lab results which were shared w/ him.   -Pt w/ ongoing mild sinus tach, c/w levalbuterol. c/w metoprolol. No clinical suspicion of PE.

## 2023-05-03 NOTE — DISCHARGE NOTE NURSING/CASE MANAGEMENT/SOCIAL WORK - NSS[facility]_GEN_ALL_CORE
----- Message from Angel Bello DO sent at 12/7/2019 11:06 AM CST -----  Triglycerides are elevated- decrease intake of fatty and fried high fat/cholesterol foods and will repeat the test in 3 months  Mild anemia  All other labs including kidney, thyroid, liver enzymes are normal. No electrolyte abnormality        Community Resource

## 2023-05-03 NOTE — PROGRESS NOTE ADULT - PROBLEM SELECTOR PLAN 3
Resolved.   pt with NAGMA, similar to previous admission  HCO3 16 likely due to acute renal failure  c/w sodium bicarbonate TID and calcitriol  - improved  Nephro following Resolved.   pt with NAGMA, similar to previous admission  HCO3 16 likely due to acute renal failure  c/w sodium bicarbonate and calcitriol  - patient has been refusing meds including sodium bicarb  Nephro following

## 2023-05-04 LAB
ALBUMIN SERPL ELPH-MCNC: 2.7 G/DL — LOW (ref 3.5–5)
ALP SERPL-CCNC: 60 U/L — SIGNIFICANT CHANGE UP (ref 40–120)
ALT FLD-CCNC: 18 U/L DA — SIGNIFICANT CHANGE UP (ref 10–60)
ANION GAP SERPL CALC-SCNC: 7 MMOL/L — SIGNIFICANT CHANGE UP (ref 5–17)
ANION GAP SERPL CALC-SCNC: 8 MMOL/L — SIGNIFICANT CHANGE UP (ref 5–17)
AST SERPL-CCNC: 15 U/L — SIGNIFICANT CHANGE UP (ref 10–40)
BILIRUB SERPL-MCNC: 0.4 MG/DL — SIGNIFICANT CHANGE UP (ref 0.2–1.2)
BUN SERPL-MCNC: 46 MG/DL — HIGH (ref 7–18)
BUN SERPL-MCNC: 49 MG/DL — HIGH (ref 7–18)
CALCIUM SERPL-MCNC: 9.5 MG/DL — SIGNIFICANT CHANGE UP (ref 8.4–10.5)
CALCIUM SERPL-MCNC: 9.6 MG/DL — SIGNIFICANT CHANGE UP (ref 8.4–10.5)
CHLORIDE SERPL-SCNC: 107 MMOL/L — SIGNIFICANT CHANGE UP (ref 96–108)
CHLORIDE SERPL-SCNC: 108 MMOL/L — SIGNIFICANT CHANGE UP (ref 96–108)
CO2 SERPL-SCNC: 20 MMOL/L — LOW (ref 22–31)
CO2 SERPL-SCNC: 22 MMOL/L — SIGNIFICANT CHANGE UP (ref 22–31)
CREAT SERPL-MCNC: 4.01 MG/DL — HIGH (ref 0.5–1.3)
CREAT SERPL-MCNC: 4.23 MG/DL — HIGH (ref 0.5–1.3)
EGFR: 13 ML/MIN/1.73M2 — LOW
EGFR: 14 ML/MIN/1.73M2 — LOW
GLUCOSE SERPL-MCNC: 56 MG/DL — LOW (ref 70–99)
GLUCOSE SERPL-MCNC: 81 MG/DL — SIGNIFICANT CHANGE UP (ref 70–99)
HCT VFR BLD CALC: 27.6 % — LOW (ref 39–50)
HGB BLD-MCNC: 8.8 G/DL — LOW (ref 13–17)
MAGNESIUM SERPL-MCNC: 2 MG/DL — SIGNIFICANT CHANGE UP (ref 1.6–2.6)
MCHC RBC-ENTMCNC: 31.4 PG — SIGNIFICANT CHANGE UP (ref 27–34)
MCHC RBC-ENTMCNC: 31.9 GM/DL — LOW (ref 32–36)
MCV RBC AUTO: 98.6 FL — SIGNIFICANT CHANGE UP (ref 80–100)
NRBC # BLD: 0 /100 WBCS — SIGNIFICANT CHANGE UP (ref 0–0)
PHOSPHATE SERPL-MCNC: 4 MG/DL — SIGNIFICANT CHANGE UP (ref 2.5–4.5)
PLATELET # BLD AUTO: 236 K/UL — SIGNIFICANT CHANGE UP (ref 150–400)
POTASSIUM SERPL-MCNC: 5.2 MMOL/L — SIGNIFICANT CHANGE UP (ref 3.5–5.3)
POTASSIUM SERPL-MCNC: 5.4 MMOL/L — HIGH (ref 3.5–5.3)
POTASSIUM SERPL-SCNC: 5.2 MMOL/L — SIGNIFICANT CHANGE UP (ref 3.5–5.3)
POTASSIUM SERPL-SCNC: 5.4 MMOL/L — HIGH (ref 3.5–5.3)
PROT SERPL-MCNC: 7.5 G/DL — SIGNIFICANT CHANGE UP (ref 6–8.3)
RBC # BLD: 2.8 M/UL — LOW (ref 4.2–5.8)
RBC # FLD: 13.7 % — SIGNIFICANT CHANGE UP (ref 10.3–14.5)
SODIUM SERPL-SCNC: 136 MMOL/L — SIGNIFICANT CHANGE UP (ref 135–145)
SODIUM SERPL-SCNC: 136 MMOL/L — SIGNIFICANT CHANGE UP (ref 135–145)
WBC # BLD: 7.87 K/UL — SIGNIFICANT CHANGE UP (ref 3.8–10.5)
WBC # FLD AUTO: 7.87 K/UL — SIGNIFICANT CHANGE UP (ref 3.8–10.5)

## 2023-05-04 PROCEDURE — 99232 SBSQ HOSP IP/OBS MODERATE 35: CPT

## 2023-05-04 PROCEDURE — 99232 SBSQ HOSP IP/OBS MODERATE 35: CPT | Mod: GC

## 2023-05-04 PROCEDURE — 93010 ELECTROCARDIOGRAM REPORT: CPT

## 2023-05-04 RX ORDER — TIOTROPIUM BROMIDE 18 UG/1
2 CAPSULE ORAL; RESPIRATORY (INHALATION)
Qty: 0 | Refills: 0 | DISCHARGE
Start: 2023-05-04

## 2023-05-04 RX ORDER — SODIUM ZIRCONIUM CYCLOSILICATE 10 G/10G
10 POWDER, FOR SUSPENSION ORAL ONCE
Refills: 0 | Status: COMPLETED | OUTPATIENT
Start: 2023-05-04 | End: 2023-05-05

## 2023-05-04 RX ORDER — ZALEPLON 10 MG
5 CAPSULE ORAL ONCE
Refills: 0 | Status: DISCONTINUED | OUTPATIENT
Start: 2023-05-04 | End: 2023-05-05

## 2023-05-04 RX ORDER — SODIUM ZIRCONIUM CYCLOSILICATE 10 G/10G
10 POWDER, FOR SUSPENSION ORAL ONCE
Refills: 0 | Status: COMPLETED | OUTPATIENT
Start: 2023-05-04 | End: 2023-05-04

## 2023-05-04 RX ORDER — SODIUM ZIRCONIUM CYCLOSILICATE 10 G/10G
10 POWDER, FOR SUSPENSION ORAL
Qty: 30 | Refills: 0
Start: 2023-05-04 | End: 2023-05-06

## 2023-05-04 RX ORDER — ACETAMINOPHEN 500 MG
750 TABLET ORAL ONCE
Refills: 0 | Status: COMPLETED | OUTPATIENT
Start: 2023-05-04 | End: 2023-05-04

## 2023-05-04 RX ORDER — OXYCODONE HYDROCHLORIDE 5 MG/1
2.5 TABLET ORAL ONCE
Refills: 0 | Status: DISCONTINUED | OUTPATIENT
Start: 2023-05-04 | End: 2023-05-04

## 2023-05-04 RX ORDER — DEXTROSE 50 % IN WATER 50 %
50 SYRINGE (ML) INTRAVENOUS ONCE
Refills: 0 | Status: COMPLETED | OUTPATIENT
Start: 2023-05-04 | End: 2023-05-04

## 2023-05-04 RX ORDER — SODIUM CHLORIDE 9 MG/ML
1000 INJECTION, SOLUTION INTRAVENOUS
Refills: 0 | Status: DISCONTINUED | OUTPATIENT
Start: 2023-05-04 | End: 2023-05-05

## 2023-05-04 RX ORDER — SODIUM BICARBONATE 1 MEQ/ML
2 SYRINGE (ML) INTRAVENOUS
Qty: 180 | Refills: 0
Start: 2023-05-04 | End: 2023-06-02

## 2023-05-04 RX ORDER — INSULIN HUMAN 100 [IU]/ML
5 INJECTION, SOLUTION SUBCUTANEOUS ONCE
Refills: 0 | Status: COMPLETED | OUTPATIENT
Start: 2023-05-04 | End: 2023-05-04

## 2023-05-04 RX ADMIN — Medication 1300 MILLIGRAM(S): at 08:50

## 2023-05-04 RX ADMIN — LIDOCAINE 1 PATCH: 4 CREAM TOPICAL at 01:17

## 2023-05-04 RX ADMIN — Medication 325 MILLIGRAM(S): at 12:09

## 2023-05-04 RX ADMIN — Medication 300 MILLIGRAM(S): at 20:32

## 2023-05-04 RX ADMIN — SODIUM CHLORIDE 80 MILLILITER(S): 9 INJECTION, SOLUTION INTRAVENOUS at 12:10

## 2023-05-04 RX ADMIN — Medication 50 MILLILITER(S): at 09:31

## 2023-05-04 RX ADMIN — OXYCODONE HYDROCHLORIDE 2.5 MILLIGRAM(S): 5 TABLET ORAL at 01:23

## 2023-05-04 RX ADMIN — LEVALBUTEROL 0.63 MILLIGRAM(S): 1.25 SOLUTION, CONCENTRATE RESPIRATORY (INHALATION) at 03:22

## 2023-05-04 RX ADMIN — Medication 50 MILLILITER(S): at 14:24

## 2023-05-04 RX ADMIN — LIDOCAINE 1 PATCH: 4 CREAM TOPICAL at 22:55

## 2023-05-04 RX ADMIN — HEPARIN SODIUM 5000 UNIT(S): 5000 INJECTION INTRAVENOUS; SUBCUTANEOUS at 05:23

## 2023-05-04 RX ADMIN — LEVALBUTEROL 0.63 MILLIGRAM(S): 1.25 SOLUTION, CONCENTRATE RESPIRATORY (INHALATION) at 09:27

## 2023-05-04 RX ADMIN — TIOTROPIUM BROMIDE 2 PUFF(S): 18 CAPSULE ORAL; RESPIRATORY (INHALATION) at 12:24

## 2023-05-04 RX ADMIN — LEVALBUTEROL 0.63 MILLIGRAM(S): 1.25 SOLUTION, CONCENTRATE RESPIRATORY (INHALATION) at 14:41

## 2023-05-04 RX ADMIN — Medication 750 MILLIGRAM(S): at 20:55

## 2023-05-04 RX ADMIN — Medication 12.5 MILLIGRAM(S): at 05:26

## 2023-05-04 RX ADMIN — LIDOCAINE 1 PATCH: 4 CREAM TOPICAL at 12:10

## 2023-05-04 RX ADMIN — FINASTERIDE 5 MILLIGRAM(S): 5 TABLET, FILM COATED ORAL at 12:09

## 2023-05-04 RX ADMIN — INSULIN HUMAN 5 UNIT(S): 100 INJECTION, SOLUTION SUBCUTANEOUS at 09:31

## 2023-05-04 RX ADMIN — BUDESONIDE AND FORMOTEROL FUMARATE DIHYDRATE 2 PUFF(S): 160; 4.5 AEROSOL RESPIRATORY (INHALATION) at 12:09

## 2023-05-04 RX ADMIN — OXYCODONE HYDROCHLORIDE 2.5 MILLIGRAM(S): 5 TABLET ORAL at 02:02

## 2023-05-04 NOTE — PROGRESS NOTE ADULT - SUBJECTIVE AND OBJECTIVE BOX
PULMONARY CONSULT SERVICE FOLLOW-UP NOTE    INTERVAL HPI:  Reviewed chart and overnight events; patient seen and examined at bedside. No new complaints today, still pending d/c to rehab. Denies dyspnea, cough, CP. Appetite continues to improve.     MEDICATIONS:  Pulmonary:  budesonide  80 MICROgram(s)/formoterol 4.5 MICROgram(s) Inhaler 2 Puff(s) Inhalation two times a day  levalbuterol Inhalation 0.63 milliGRAM(s) Inhalation every 6 hours  tiotropium 2.5 MICROgram(s) Inhaler 2 Puff(s) Inhalation daily    Antimicrobials:    Anticoagulants:  heparin   Injectable 5000 Unit(s) SubCutaneous every 12 hours    Cardiac:  metoprolol tartrate 12.5 milliGRAM(s) Oral two times a day    Allergies    No Known Allergies    Intolerances    Vital Signs Last 24 Hrs  T(C): 36.6 (04 May 2023 04:45), Max: 36.9 (03 May 2023 16:01)  T(F): 97.9 (04 May 2023 04:45), Max: 98.4 (03 May 2023 16:01)  HR: 111 (04 May 2023 04:45) (95 - 111)  BP: 119/61 (04 May 2023 04:45) (95/48 - 126/59)  BP(mean): --  RR: 18 (04 May 2023 04:45) (18 - 18)  SpO2: 98% (04 May 2023 04:45) (98% - 100%)    Parameters below as of 04 May 2023 04:45  Patient On (Oxygen Delivery Method): room air    05-03 @ 07:01  -  05-04 @ 07:00  --------------------------------------------------------  IN: 0 mL / OUT: 1150 mL / NET: -1150 mL    PHYSICAL EXAM:  Constitutional: well-appearing  HEENT: NC/AT; PERRL, anicteric sclera; MMM  Neck: supple  Cardiovascular: +S1/S2, RRR  Respiratory: CTA B/L; no W/R/R  Gastrointestinal: soft, NT/ND  Extremities: WWP; no edema, clubbing or cyanosis  Vascular: 2+ radial pulses B/L  Neurological: awake and alert; ROMAN    LABS:  CBC Full  -  ( 04 May 2023 05:44 )  WBC Count : 7.87 K/uL  RBC Count : 2.80 M/uL  Hemoglobin : 8.8 g/dL  Hematocrit : 27.6 %  Platelet Count - Automated : 236 K/uL  Mean Cell Volume : 98.6 fl  Mean Cell Hemoglobin : 31.4 pg  Mean Cell Hemoglobin Concentration : 31.9 gm/dL  Auto Neutrophil # : x  Auto Lymphocyte # : x  Auto Monocyte # : x  Auto Eosinophil # : x  Auto Basophil # : x  Auto Neutrophil % : x  Auto Lymphocyte % : x  Auto Monocyte % : x  Auto Eosinophil % : x  Auto Basophil % : x    05-04    136  |  107  |  49<H>  ----------------------------<  56<L>  5.2   |  22  |  4.23<H>    Ca    9.6      04 May 2023 12:30  Phos  4.0     05-04  Mg     2.0     05-04    TPro  7.5  /  Alb  2.7<L>  /  TBili  0.4  /  DBili  x   /  AST  15  /  ALT  18  /  AlkPhos  60  05-04    RADIOLOGY & ADDITIONAL STUDIES:

## 2023-05-04 NOTE — PROGRESS NOTE ADULT - SUBJECTIVE AND OBJECTIVE BOX
PGY-1 Progress Note discussed with attending    PAGER #: [[phone]] TILL 5:00 PM  PLEASE CONTACT ON CALL TEAM:   - On Call Team (Please refer to Dustin) FROM 5:00 PM - 8:30PM  - Nightfloat Team FROM 8:30 -7:30 AM    INTERVAL HPI/OVERNIGHT EVENTS:       REVIEW OF SYSTEMS:  CONSTITUTIONAL: No fever, weight loss, or fatigue  RESPIRATORY: No cough, wheezing, chills or hemoptysis; No shortness of breath  CARDIOVASCULAR: No chest pain, palpitations, dizziness, or leg swelling  GASTROINTESTINAL: No abdominal pain. No nausea, vomiting, or hematemesis; No diarrhea or constipation. No melena or hematochezia.  GENITOURINARY: No dysuria or hematuria, urinary frequency  NEUROLOGICAL: No headaches, memory loss, loss of strength, numbness, or tremors  SKIN: No itching, burning, rashes, or lesions     MEDICATIONS  (STANDING):  budesonide  80 MICROgram(s)/formoterol 4.5 MICROgram(s) Inhaler 2 Puff(s) Inhalation two times a day  calcitriol   Capsule 0.25 MICROGram(s) Oral <User Schedule>  ferrous    sulfate 325 milliGRAM(s) Oral daily  finasteride 5 milliGRAM(s) Oral daily  heparin   Injectable 5000 Unit(s) SubCutaneous every 12 hours  levalbuterol Inhalation 0.63 milliGRAM(s) Inhalation every 6 hours  lidocaine   4% Patch 1 Patch Transdermal daily  melatonin 5 milliGRAM(s) Oral at bedtime  metoprolol tartrate 12.5 milliGRAM(s) Oral two times a day  sodium bicarbonate 1300 milliGRAM(s) Oral two times a day  sodium chloride 0.45%. 1000 milliLiter(s) (80 mL/Hr) IV Continuous <Continuous>  tamsulosin 0.4 milliGRAM(s) Oral at bedtime  tiotropium 2.5 MICROgram(s) Inhaler 2 Puff(s) Inhalation daily    MEDICATIONS  (PRN):  acetaminophen     Tablet .. 650 milliGRAM(s) Oral every 6 hours PRN Temp greater or equal to 38C (100.4F), Mild Pain (1 - 3)      Vital Signs Last 24 Hrs  T(C): 36.6 (04 May 2023 04:45), Max: 36.9 (03 May 2023 16:01)  T(F): 97.9 (04 May 2023 04:45), Max: 98.4 (03 May 2023 16:01)  HR: 111 (04 May 2023 04:45) (95 - 111)  BP: 119/61 (04 May 2023 04:45) (95/48 - 126/59)  BP(mean): --  RR: 18 (04 May 2023 04:45) (18 - 18)  SpO2: 98% (04 May 2023 04:45) (98% - 100%)    Parameters below as of 04 May 2023 04:45  Patient On (Oxygen Delivery Method): room air        PHYSICAL EXAMINATION:  GENERAL: NAD, well built  HEAD:  Atraumatic, Normocephalic  EYES:  conjunctiva and sclera clear  NECK: Supple, No JVD, Normal thyroid  CHEST/LUNG: Clear to auscultation. Clear to percussion bilaterally; No rales, rhonchi, wheezing, or rubs  HEART: Regular rate and rhythm; No murmurs, rubs, or gallops  ABDOMEN: Soft, Nontender, Nondistended; Bowel sounds present  NERVOUS SYSTEM:  Alert & Oriented X3,    EXTREMITIES:  2+ Peripheral Pulses, No clubbing, cyanosis, or edema  SKIN: warm dry                          8.8    7.87  )-----------( 236      ( 04 May 2023 05:44 )             27.6     05-04    136  |  107  |  49<H>  ----------------------------<  56<L>  5.2   |  22  |  4.23<H>    Ca    9.6      04 May 2023 12:30  Phos  4.0     05-04  Mg     2.0     05-04    TPro  7.5  /  Alb  2.7<L>  /  TBili  0.4  /  DBili  x   /  AST  15  /  ALT  18  /  AlkPhos  60  05-04    LIVER FUNCTIONS - ( 04 May 2023 05:44 )  Alb: 2.7 g/dL / Pro: 7.5 g/dL / ALK PHOS: 60 U/L / ALT: 18 U/L DA / AST: 15 U/L / GGT: x                       I&O's Summary    03 May 2023 07:01  -  04 May 2023 07:00  --------------------------------------------------------  IN: 0 mL / OUT: 1150 mL / NET: -1150 mL        CAPILLARY BLOOD GLUCOSE      POCT Blood Glucose.: 227 mg/dL (03 May 2023 13:07)    CAPILLARY BLOOD GLUCOSE          RADIOLOGY & ADDITIONAL TESTS:                   PGY-1 Progress Note discussed with attending    PAGER #: [[phone]] TILL 5:00 PM  PLEASE CONTACT ON CALL TEAM:   - On Call Team (Please refer to Dustin) FROM 5:00 PM - 8:30PM  - Nightfloat Team FROM 8:30 -7:30 AM    INTERVAL HPI/OVERNIGHT EVENTS:   No acute overnight events. Pt states that his breathing has improved. He states he has back pain. Otherwise denies any other acute complaints.     REVIEW OF SYSTEMS:  CONSTITUTIONAL: No fever, weight loss, or fatigue  RESPIRATORY: No cough, wheezing, chills or hemoptysis; No shortness of breath  CARDIOVASCULAR: No chest pain, palpitations, dizziness, or leg swelling  GASTROINTESTINAL: No abdominal pain. No nausea, vomiting, or hematemesis; No diarrhea or constipation. No melena or hematochezia.  GENITOURINARY: No dysuria or hematuria, urinary frequency  NEUROLOGICAL: No headaches, memory loss, loss of strength, numbness, or tremors  SKIN: No itching, burning, rashes, or lesions     MEDICATIONS  (STANDING):  budesonide  80 MICROgram(s)/formoterol 4.5 MICROgram(s) Inhaler 2 Puff(s) Inhalation two times a day  calcitriol   Capsule 0.25 MICROGram(s) Oral <User Schedule>  ferrous    sulfate 325 milliGRAM(s) Oral daily  finasteride 5 milliGRAM(s) Oral daily  heparin   Injectable 5000 Unit(s) SubCutaneous every 12 hours  levalbuterol Inhalation 0.63 milliGRAM(s) Inhalation every 6 hours  lidocaine   4% Patch 1 Patch Transdermal daily  melatonin 5 milliGRAM(s) Oral at bedtime  metoprolol tartrate 12.5 milliGRAM(s) Oral two times a day  sodium bicarbonate 1300 milliGRAM(s) Oral two times a day  sodium chloride 0.45%. 1000 milliLiter(s) (80 mL/Hr) IV Continuous <Continuous>  tamsulosin 0.4 milliGRAM(s) Oral at bedtime  tiotropium 2.5 MICROgram(s) Inhaler 2 Puff(s) Inhalation daily    MEDICATIONS  (PRN):  acetaminophen     Tablet .. 650 milliGRAM(s) Oral every 6 hours PRN Temp greater or equal to 38C (100.4F), Mild Pain (1 - 3)      Vital Signs Last 24 Hrs  T(C): 36.6 (04 May 2023 04:45), Max: 36.9 (03 May 2023 16:01)  T(F): 97.9 (04 May 2023 04:45), Max: 98.4 (03 May 2023 16:01)  HR: 111 (04 May 2023 04:45) (95 - 111)  BP: 119/61 (04 May 2023 04:45) (95/48 - 126/59)  BP(mean): --  RR: 18 (04 May 2023 04:45) (18 - 18)  SpO2: 98% (04 May 2023 04:45) (98% - 100%)    Parameters below as of 04 May 2023 04:45  Patient On (Oxygen Delivery Method): room air        PHYSICAL EXAMINATION:  GENERAL: NAD, elderly frail male  HEAD:  Atraumatic, Normocephalic  EYES:  conjunctiva and sclera clear  NECK: Supple, No JVD, Normal thyroid  CHEST/LUNG: + mild bibasilar crackles. Clear to percussion bilaterally; No rales, rhonchi, wheezing, or rubs  HEART: Regular rate and rhythm; No murmurs, rubs, or gallops  ABDOMEN: Soft, Nontender, Nondistended; Bowel sounds present  NERVOUS SYSTEM:  Alert & Oriented X3  EXTREMITIES:  2+ Peripheral Pulses, No clubbing, cyanosis, or edema  SKIN: warm dry, + bajwa                          8.8    7.87  )-----------( 236      ( 04 May 2023 05:44 )             27.6     05-04    136  |  107  |  49<H>  ----------------------------<  56<L>  5.2   |  22  |  4.23<H>    Ca    9.6      04 May 2023 12:30  Phos  4.0     05-04  Mg     2.0     05-04    TPro  7.5  /  Alb  2.7<L>  /  TBili  0.4  /  DBili  x   /  AST  15  /  ALT  18  /  AlkPhos  60  05-04    LIVER FUNCTIONS - ( 04 May 2023 05:44 )  Alb: 2.7 g/dL / Pro: 7.5 g/dL / ALK PHOS: 60 U/L / ALT: 18 U/L DA / AST: 15 U/L / GGT: x                       I&O's Summary    03 May 2023 07:01  -  04 May 2023 07:00  --------------------------------------------------------  IN: 0 mL / OUT: 1150 mL / NET: -1150 mL        CAPILLARY BLOOD GLUCOSE  POCT Blood Glucose.: 227 mg/dL (03 May 2023 13:07)

## 2023-05-04 NOTE — PROGRESS NOTE ADULT - PROBLEM SELECTOR PLAN 3
Resolved.   pt with NAGMA, similar to previous admission  HCO3 16 likely due to acute renal failure  c/w sodium bicarbonate and calcitriol  - patient has been refusing meds including sodium bicarb  Nephro following - pt with hyperkalemia on labs  - given insulin, D50 and lokelma  - Will DC on lokelma for 3 days and will need repeat outpatient labs in 1 week  - f/u outpatient

## 2023-05-04 NOTE — PROGRESS NOTE ADULT - ATTENDING COMMENTS
87M from home with PMH of pulmonary fibrosis recent ICU admission with pneumothorax and urinary retention then leaving AMA presents with urinary retention and abdominal discomfort found to have acute hyperkalemia and SANDI. S/p bajwa placement with improvement in Cr and K. Course complicated by sinus tach, suspect due to overuse of Duoneb. Pending JOSE GUADALUPE    Pt seen and examined, no new complaints.     Labs reviewed- cbc, bmp, Mg, Po4 , lfts     A/P:  SANDI likely ATN, obstructive, improving s/p Bajwa  Hyperkalemia   NAGMA   Acute urinary retention  Sinus tach- improved   Pulm fibrosis  AHRF- resolved     -Patient's Cr started worsening again, still making urine w/ no obstruction -Bajwa draining well. will give trial of iv fluids. He was hypotensive overnight which may have contributed.   -Noted w/ hyperkalemia, s/p Lokelma- Repeat BMP w/ improved K+ . Will DC w/ Lokelma   -CO2 improved   -Pt was for DC today but transport cancelled as patient w/ worsening sandi. D/w patient and his family- son and ex-wife.

## 2023-05-04 NOTE — PROGRESS NOTE ADULT - PROBLEM SELECTOR PLAN 1
patient presents with elevated Cr to 5.3, unclear baseline however recently elevated up to 6.8 and improving since prior admission  associated with urinary retention  bajwa placed by urology overnight per patient request in ED  CT shows renal pelvis fullness of bilateral renal pelvis without calcified stone identified along the course the ureter. Bladder wall thickening. Prostate Enlargement.  - maintain bajwa on discharge  - monitor Cr  - avoid nephrotoxic agents  - Cr improving daily  - NephDr. Augustine silva following patient presents with elevated Cr to 5.3, unclear baseline however recently elevated up to 6.8 and improving since prior admission  associated with urinary retention  bajwa placed by urology overnight per patient request in ED  CT shows renal pelvis fullness of bilateral renal pelvis without calcified stone identified along the course the ureter. Bladder wall thickening. Prostate Enlargement.  - maintain bajwa on discharge  - monitor Cr  - avoid nephrotoxic agents  - SCr worsened today, no signs of bladder obstruction, continue to monitor  - NephroDr. Bradshaw following

## 2023-05-04 NOTE — PROGRESS NOTE ADULT - PROBLEM SELECTOR PLAN 5
CT shows redemonstrated pneumomediastinum, continued findings of diffuse lung fibrosis with peripheral/basilar reticular opacities and honeycombing.   CT Surgery recommended no acute intervention last admission  c/w Spiriva, Symbicort  C/w Levalbuterol  saturating well on room air  Dr. Miri Mcclure following pt with sinus tachy to 140s on tele  possibly 2/2 to albuterol/duonebs  DC levalbuterol due to persistent tachycardia  started metoprolol 12.5mg BID, however patient has been refusing

## 2023-05-04 NOTE — PROGRESS NOTE ADULT - ASSESSMENT
87 year old M from home with no known PMH presents with urinary retention. Patient reports since being discharged he has had very little urine output. Denies any blood in urine or pain. Denies fever, chills, cp, sob, palpitations, lightheadedness, dizziness, n/v/d, abdominal pain, constipation.  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  5) Anemia of chronic kidney disease  6) Renal osteodystrophy  7) NAGMA  8) BPH with LUTs  9) Dysphagia with improvement      Recommend:  Strict I/o  Avoid Nephrotoxic agents  S cr 4.2 with non oliguria  K level 5.4  Lokelma 10 mg po   Low K/Low phos renal diet  Continue  Lomax's catheter with flush  Monitor BMP and electrolytes daily  Continue Flomax and finasteride  CT abdomen and pelvis without contrast results reviewed  Urology/pulmonary follow up  Able to tolerate po intake/Speech and swallow team follow up  Anemia panel / CKD work up reviewed  Continue sodium bicarbonate 1300 mg po bid with meals with goal serum bicarbonate>22  Continue po calcitriol 0.25 mcg po TIW  Iron studies/Hgb/Hct reviewed  Ferrous sulfate/EPO as per ordered  PSA within normal limits  Volume status and electrolytes noted  No urgent need for RRT/HD  Discharge planning per primary team to [facility]  Out patient renal follow up on discharge  Will follow

## 2023-05-04 NOTE — PROGRESS NOTE ADULT - PROBLEM SELECTOR PLAN 7
dvt ppx SQ heparin  PT recommends [facility] CT Abdomen & Pelvis shows redemonstrated pneumomediastinum.  Patient saturating well on room air  No acute interventions as above

## 2023-05-04 NOTE — PROGRESS NOTE ADULT - SUBJECTIVE AND OBJECTIVE BOX
Rafael Bradshaw MD (Nephrology progress note)  [address], [facility],  [address],  [facility]  TEl: [phone]  Cell: [phone]    Patient is a 87y Male seen and evaluated at bedside. Vital signs, laboratory data, imaging studies, consult notes reviewed done within past 24 hours. Overnight events noted.    Allergies    No Known Allergies    Intolerances        ROS:  CONSTITUTIONAL: No fever or chills.  HEENT: No headcahe or visual disturbances.  RESPIRATORY: No shortness of breath, cough, hemoptysis or wheezing  CARDIOVASCULAR: No Chest pain, shortness of breath, palpitations, dizziness, syncope, orthopnea, PND or leg swelling.  GASTROINTESTINAL: No abdominal pain, nausea, vomiting, diarrhea, hematemesis or blood per rectum.  GENITOURINARY: No urinary frequency, urgency, gross hematuria, dysuria, flank or supra pubic pain, difficulty passing urine.  NEUROLOGICAL: No headache, focal limb weakness, tingling or numbness or seizure like activity  SKIN: No skin rash or lesion  MUSCULOSKELETAL: No leg pain, calf pain or swelling    VITALS:    T(C): 36.6 (05-04-23 @ 04:45), Max: 36.9 (05-03-23 @ 16:01)  HR: 111 (05-04-23 @ 04:45) (95 - 111)  BP: 119/61 (05-04-23 @ 04:45) (95/48 - 126/59)  RR: 18 (05-04-23 @ 04:45) (18 - 18)  SpO2: 98% (05-04-23 @ 04:45) (98% - 100%)  CAPILLARY BLOOD GLUCOSE      POCT Blood Glucose.: 227 mg/dL (03 May 2023 13:07)      05-03-23 @ 07:01  -  05-04-23 @ 07:00  --------------------------------------------------------  IN: 0 mL / OUT: 1150 mL / NET: -1150 mL      MEDICATIONS  (STANDING):  budesonide  80 MICROgram(s)/formoterol 4.5 MICROgram(s) Inhaler 2 Puff(s) Inhalation two times a day  calcitriol   Capsule 0.25 MICROGram(s) Oral <User Schedule>  ferrous    sulfate 325 milliGRAM(s) Oral daily  finasteride 5 milliGRAM(s) Oral daily  heparin   Injectable 5000 Unit(s) SubCutaneous every 12 hours  levalbuterol Inhalation 0.63 milliGRAM(s) Inhalation every 6 hours  lidocaine   4% Patch 1 Patch Transdermal daily  melatonin 5 milliGRAM(s) Oral at bedtime  metoprolol tartrate 12.5 milliGRAM(s) Oral two times a day  sodium bicarbonate 1300 milliGRAM(s) Oral two times a day  tamsulosin 0.4 milliGRAM(s) Oral at bedtime  tiotropium 2.5 MICROgram(s) Inhaler 2 Puff(s) Inhalation daily    MEDICATIONS  (PRN):  acetaminophen     Tablet .. 650 milliGRAM(s) Oral every 6 hours PRN Temp greater or equal to 38C (100.4F), Mild Pain (1 - 3)      PHYSICAL EXAM:  GENERAL: Alert, awake and oriented x3 in no distress  HEENT: MAGALYS, EOMI, neck supple, no JVP, no carotid bruit, oral mucosa moist and pink.  CHEST/LUNG: Bilateral clear breath sounds, no rales, no crepitations, no rhonchi, no wheezing  HEART: Regular rate and rhythm, CHANTAL II/VI at LPSB, no gallops, no rub   ABDOMEN: Soft, nontender, non distended, bowel sounds present, no palpable organomegaly  : No flank or supra pubic tenderness.  EXTREMITIES: Peripheral pulses are palpable, no pedal edema  Neurology: AAOx3, no focal neurological deficit  SKIN: No rash or skin lesion  Musculoskeletal: No joint deformity or spinal tenderness.      Vascular ACCESS:     LABS:                        8.8    7.87  )-----------( 236      ( 04 May 2023 05:44 )             27.6     05-04    136  |  108  |  46<H>  ----------------------------<  81  5.4<H>   |  20<L>  |  4.01<H>    Ca    9.5      04 May 2023 05:44  Phos  4.0     05-04  Mg     2.0     05-04    TPro  7.5  /  Alb  2.7<L>  /  TBili  0.4  /  DBili  x   /  AST  15  /  ALT  18  /  AlkPhos  60  05-04                RADIOLOGY & ADDITIONAL STUDIES:    Imaging Personally Reviewed:  [x] YES  [ ] NO    Consultant(s) Notes Reviewed:  [x] YES  [ ] NO    Care Discussed with Primary team/ Other Providers [x] YES  [ ] NO       Rafael Bradshaw MD (Nephrology progress note)  [address], [facility],  [address],  [facility]  TEl: [phone]  Cell: [phone]    Patient is a 87y Male seen and evaluated at bedside. Vital signs, laboratory data, imaging studies, consult notes reviewed done within past 24 hours. Overnight events noted. Patient with worsening S cr to 4.2 with non oliguria and mild hyperkalemia with K level 5.4 today morning    Allergies    No Known Allergies    Intolerances        ROS:  Limited  Alert and awake in no distress  Denies any chest pain, SOB  Denies abdominal pain  Lomax's catheter in place    VITALS:    T(C): 36.6 (05-04-23 @ 04:45), Max: 36.9 (05-03-23 @ 16:01)  HR: 111 (05-04-23 @ 04:45) (95 - 111)  BP: 119/61 (05-04-23 @ 04:45) (95/48 - 126/59)  RR: 18 (05-04-23 @ 04:45) (18 - 18)  SpO2: 98% (05-04-23 @ 04:45) (98% - 100%)  CAPILLARY BLOOD GLUCOSE      POCT Blood Glucose.: 227 mg/dL (03 May 2023 13:07)      05-03-23 @ 07:01  -  05-04-23 @ 07:00  --------------------------------------------------------  IN: 0 mL / OUT: 1150 mL / NET: -1150 mL      MEDICATIONS  (STANDING):  budesonide  80 MICROgram(s)/formoterol 4.5 MICROgram(s) Inhaler 2 Puff(s) Inhalation two times a day  calcitriol   Capsule 0.25 MICROGram(s) Oral <User Schedule>  ferrous    sulfate 325 milliGRAM(s) Oral daily  finasteride 5 milliGRAM(s) Oral daily  heparin   Injectable 5000 Unit(s) SubCutaneous every 12 hours  levalbuterol Inhalation 0.63 milliGRAM(s) Inhalation every 6 hours  lidocaine   4% Patch 1 Patch Transdermal daily  melatonin 5 milliGRAM(s) Oral at bedtime  metoprolol tartrate 12.5 milliGRAM(s) Oral two times a day  sodium bicarbonate 1300 milliGRAM(s) Oral two times a day  tamsulosin 0.4 milliGRAM(s) Oral at bedtime  tiotropium 2.5 MICROgram(s) Inhaler 2 Puff(s) Inhalation daily    MEDICATIONS  (PRN):  acetaminophen     Tablet .. 650 milliGRAM(s) Oral every 6 hours PRN Temp greater or equal to 38C (100.4F), Mild Pain (1 - 3)      PHYSICAL EXAM:  GENERAL: Alert and awake in no distress  HEENT: MAGALYS, EOMI, neck supple, no JVP, no carotid bruit, oral mucosa moist and pale  CHEST/LUNG: Bilateral clear breath sounds, no rales, no crepitations, no rhonchi, no wheezing  HEART: Regular rate and rhythm, CHANTAL II/VI at LPSB, no gallops, no rub   ABDOMEN: Soft, nontender, non distended, bowel sounds present, no palpable organomegaly  : No flank or supra pubic tenderness, Lomax's catheter in place  EXTREMITIES: Peripheral pulses are palpable, no pedal edema  Neurology: AAOx2-3, no focal neurological deficit  SKIN: No rash or skin lesion  Musculoskeletal: No joint deformity or spinal tenderness.      Vascular ACCESS:     LABS:                        8.8    7.87  )-----------( 236      ( 04 May 2023 05:44 )             27.6     05-04    136  |  108  |  46<H>  ----------------------------<  81  5.4<H>   |  20<L>  |  4.01<H>    Ca    9.5      04 May 2023 05:44  Phos  4.0     05-04  Mg     2.0     05-04    TPro  7.5  /  Alb  2.7<L>  /  TBili  0.4  /  DBili  x   /  AST  15  /  ALT  18  /  AlkPhos  60  05-04                RADIOLOGY & ADDITIONAL STUDIES:  rad< from: US Duplex Venous Lower Ext Complete, Bilateral (05.03.23 @ 20:00) >    ACC: [id number] EXAM:  US DPLX LWR EXT VEINS COMPL BI   ORDERED BY: RAUL THURMAN     PROCEDURE DATE:  05/03/2023          INTERPRETATION:  CLINICAL INFORMATION: Bilateral lower extremity swelling.    COMPARISON: None available.    TECHNIQUE: Duplex sonography of the BILATERAL LOWER extremity veins with   color and spectral Doppler, with and without compression.    FINDINGS:    RIGHT:  Normal compressibility of the RIGHT common femoral, femoral and popliteal   veins.  Doppler examination shows normal spontaneous and phasic flow.  No RIGHT calf vein thrombosis is detected.    LEFT:  Normal compressibility of the LEFT common femoral, femoral and popliteal   veins.  Doppler examination shows normal spontaneous and phasic flow.  No LEFT calf veinthrombosis is detected.    IMPRESSION:  No evidence of deep venous thrombosis in either lower extremity.            --- End of Report ---            SAE DRAKE MD; Attending Radiologist  This document has been electronically signed. May  3 2023  8:06PM    < end of copied text >    Imaging Personally Reviewed:  [x] YES  [ ] NO    Consultant(s) Notes Reviewed:  [x] YES  [ ] NO    Care Discussed with Primary team/ Other Providers [x] YES  [ ] NO

## 2023-05-04 NOTE — PROGRESS NOTE ADULT - PROBLEM SELECTOR PLAN 4
pt with sinus tachy to 140s on tele  possibly 2/2 to albuterol/duonebs  start levalbuterol  started metoprolol 12.5mg BID, however patient has been refusing Resolved.   pt with NAGMA, similar to previous admission  HCO3 16 likely due to acute renal failure  c/w sodium bicarbonate and calcitriol  - patient has been refusing meds including sodium bicarb, today pt accepted  Nephro following

## 2023-05-04 NOTE — PROGRESS NOTE ADULT - ASSESSMENT
87 year old M from home with PMH of pulmonary fibrosis, pertinent recent admission to [facility] ICU (4/23-4/26) for close monitoring. CT show pulmonary fibrosis with pneumomediastinum and small right apical PTX . He was initially started on BiPaP changed to NC. NAGMA due to SANDI. Pt left AMA. Pt presents with urinary retention and acute hyperkalemia admitted to telemetry for management of hyperkalemia and SANDI.

## 2023-05-04 NOTE — PROGRESS NOTE ADULT - ASSESSMENT
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 Left AMA from ICU then returned to hospital 4/27 with urinary retention and ongoing renal failure for which he was re-admitted. Asked for mgmt recs in ILD/IPF.    #ILD most c/w radiographic UIP/IPF pattern  #Pneumothorax, likely spontaneous; resolving  #Pneumomediastinum; resolving    Recommendations:  - assess O2 needs  - would d/c bronchodilators given tachy and no clinical indication  - outpt pulm f/u

## 2023-05-04 NOTE — PROGRESS NOTE ADULT - PROBLEM SELECTOR PLAN 6
CT Abdomen & Pelvis shows redemonstrated pneumomediastinum.  Patient saturating well on room air  No acute interventions as above CT shows redemonstrated pneumomediastinum, continued findings of diffuse lung fibrosis with peripheral/basilar reticular opacities and honeycombing.   CT Surgery recommended no acute intervention last admission  c/w Spiriva  DC Levalbuterol & Symbicort on discharge due to persistent tachycardia  saturating well on room air  Dr. Miri Mcclure following

## 2023-05-05 LAB
ALBUMIN SERPL ELPH-MCNC: 2.6 G/DL — LOW (ref 3.5–5)
ALP SERPL-CCNC: 56 U/L — SIGNIFICANT CHANGE UP (ref 40–120)
ALT FLD-CCNC: 18 U/L DA — SIGNIFICANT CHANGE UP (ref 10–60)
ANION GAP SERPL CALC-SCNC: 9 MMOL/L — SIGNIFICANT CHANGE UP (ref 5–17)
AST SERPL-CCNC: 20 U/L — SIGNIFICANT CHANGE UP (ref 10–40)
BILIRUB SERPL-MCNC: 0.4 MG/DL — SIGNIFICANT CHANGE UP (ref 0.2–1.2)
BUN SERPL-MCNC: 46 MG/DL — HIGH (ref 7–18)
CALCIUM SERPL-MCNC: 9.5 MG/DL — SIGNIFICANT CHANGE UP (ref 8.4–10.5)
CHLORIDE SERPL-SCNC: 107 MMOL/L — SIGNIFICANT CHANGE UP (ref 96–108)
CO2 SERPL-SCNC: 19 MMOL/L — LOW (ref 22–31)
CREAT SERPL-MCNC: 4.35 MG/DL — HIGH (ref 0.5–1.3)
EGFR: 13 ML/MIN/1.73M2 — LOW
GLUCOSE SERPL-MCNC: 99 MG/DL — SIGNIFICANT CHANGE UP (ref 70–99)
HCT VFR BLD CALC: 26.3 % — LOW (ref 39–50)
HGB BLD-MCNC: 8.3 G/DL — LOW (ref 13–17)
MAGNESIUM SERPL-MCNC: 2 MG/DL — SIGNIFICANT CHANGE UP (ref 1.6–2.6)
MCHC RBC-ENTMCNC: 30.7 PG — SIGNIFICANT CHANGE UP (ref 27–34)
MCHC RBC-ENTMCNC: 31.6 GM/DL — LOW (ref 32–36)
MCV RBC AUTO: 97.4 FL — SIGNIFICANT CHANGE UP (ref 80–100)
NRBC # BLD: 0 /100 WBCS — SIGNIFICANT CHANGE UP (ref 0–0)
PHOSPHATE SERPL-MCNC: 4.3 MG/DL — SIGNIFICANT CHANGE UP (ref 2.5–4.5)
PLATELET # BLD AUTO: 254 K/UL — SIGNIFICANT CHANGE UP (ref 150–400)
POTASSIUM SERPL-MCNC: 5 MMOL/L — SIGNIFICANT CHANGE UP (ref 3.5–5.3)
POTASSIUM SERPL-SCNC: 5 MMOL/L — SIGNIFICANT CHANGE UP (ref 3.5–5.3)
PROT SERPL-MCNC: 7.3 G/DL — SIGNIFICANT CHANGE UP (ref 6–8.3)
RBC # BLD: 2.7 M/UL — LOW (ref 4.2–5.8)
RBC # FLD: 13.7 % — SIGNIFICANT CHANGE UP (ref 10.3–14.5)
SODIUM SERPL-SCNC: 135 MMOL/L — SIGNIFICANT CHANGE UP (ref 135–145)
WBC # BLD: 8.62 K/UL — SIGNIFICANT CHANGE UP (ref 3.8–10.5)
WBC # FLD AUTO: 8.62 K/UL — SIGNIFICANT CHANGE UP (ref 3.8–10.5)

## 2023-05-05 PROCEDURE — 99232 SBSQ HOSP IP/OBS MODERATE 35: CPT

## 2023-05-05 PROCEDURE — 99232 SBSQ HOSP IP/OBS MODERATE 35: CPT | Mod: GC

## 2023-05-05 RX ORDER — SODIUM CHLORIDE 9 MG/ML
1000 INJECTION, SOLUTION INTRAVENOUS
Refills: 0 | Status: DISCONTINUED | OUTPATIENT
Start: 2023-05-05 | End: 2023-05-05

## 2023-05-05 RX ORDER — MIRTAZAPINE 45 MG/1
7.5 TABLET, ORALLY DISINTEGRATING ORAL AT BEDTIME
Refills: 0 | Status: DISCONTINUED | OUTPATIENT
Start: 2023-05-05 | End: 2023-05-06

## 2023-05-05 RX ORDER — SODIUM CHLORIDE 9 MG/ML
1000 INJECTION, SOLUTION INTRAVENOUS
Refills: 0 | Status: DISCONTINUED | OUTPATIENT
Start: 2023-05-05 | End: 2023-05-07

## 2023-05-05 RX ADMIN — Medication 5 MILLIGRAM(S): at 21:56

## 2023-05-05 RX ADMIN — MIRTAZAPINE 7.5 MILLIGRAM(S): 45 TABLET, ORALLY DISINTEGRATING ORAL at 21:57

## 2023-05-05 RX ADMIN — LIDOCAINE 1 PATCH: 4 CREAM TOPICAL at 19:41

## 2023-05-05 RX ADMIN — FINASTERIDE 5 MILLIGRAM(S): 5 TABLET, FILM COATED ORAL at 12:59

## 2023-05-05 RX ADMIN — Medication 1300 MILLIGRAM(S): at 19:03

## 2023-05-05 RX ADMIN — CALCITRIOL 0.25 MICROGRAM(S): 0.5 CAPSULE ORAL at 10:32

## 2023-05-05 RX ADMIN — LIDOCAINE 1 PATCH: 4 CREAM TOPICAL at 12:59

## 2023-05-05 RX ADMIN — Medication 5 MILLIGRAM(S): at 21:57

## 2023-05-05 RX ADMIN — LIDOCAINE 1 PATCH: 4 CREAM TOPICAL at 00:49

## 2023-05-05 RX ADMIN — Medication 12.5 MILLIGRAM(S): at 19:04

## 2023-05-05 RX ADMIN — TIOTROPIUM BROMIDE 2 PUFF(S): 18 CAPSULE ORAL; RESPIRATORY (INHALATION) at 12:59

## 2023-05-05 RX ADMIN — SODIUM ZIRCONIUM CYCLOSILICATE 10 GRAM(S): 10 POWDER, FOR SUSPENSION ORAL at 00:21

## 2023-05-05 RX ADMIN — TAMSULOSIN HYDROCHLORIDE 0.4 MILLIGRAM(S): 0.4 CAPSULE ORAL at 21:57

## 2023-05-05 RX ADMIN — Medication 325 MILLIGRAM(S): at 12:59

## 2023-05-05 NOTE — PROGRESS NOTE ADULT - PROBLEM SELECTOR PLAN 6
CT shows redemonstrated pneumomediastinum, continued findings of diffuse lung fibrosis with peripheral/basilar reticular opacities and honeycombing.   CT Surgery recommended no acute intervention last admission  c/w Spiriva  DC Levalbuterol & Symbicort on discharge due to persistent tachycardia  saturating well on room air  Dr. Miri Mcclure following

## 2023-05-05 NOTE — PROGRESS NOTE ADULT - ATTENDING COMMENTS
This is a late entry, patient was seen and examined on 5/5     87M from home with PMH of pulmonary fibrosis recent ICU admission with pneumothorax and urinary retention then leaving AMA presents with urinary retention and abdominal discomfort found to have acute hyperkalemia and SANDI. S/p bajwa placement with improvement in Cr and K. Course complicated by sinus tach, suspect due to overuse of Duoneb. Pending [facility]    Pt seen and examined, no new complaints. Overnight, patient received sonata to help w/ sleep. He also got mirtazapine and an additional Seroquel. This morning, patient is still sleepy which could be due to medication effect.      Labs reviewed- cbc, bmp, Mg, PO4     A/P:  SANDI likely ATN w/ combination of obstructive   Hyperkalemia   NAGMA   Acute urinary retention  Sinus tach- improved   Pulm fibrosis  AHRF- resolved     -Plan:  -SCr continues to uptrend, d/w nephrologist Dr. Bradshaw- advised to start IVF and monitor renal function. Monitor I&O strictly.   -IVF   - c/w Sodium bicarb   -C/w Bajwa cathter  -Avid Bz or sedatives

## 2023-05-05 NOTE — PROGRESS NOTE ADULT - ASSESSMENT
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 Left AMA from ICU then returned to hospital 4/27 with urinary retention and ongoing renal failure for which he was re-admitted. Asked for mgmt recs in ILD/IPF.    #ILD most c/w radiographic UIP/IPF pattern  #Pneumothorax, likely spontaneous; resolving  #Pneumomediastinum; resolving    Recommendations:  - assess O2 needs  - monitor off bronchodilators, thus far has been ok  - outpt pulm f/u  - asp precautions    Will sign off now, please call back with questions/concerns

## 2023-05-05 NOTE — PROGRESS NOTE ADULT - SUBJECTIVE AND OBJECTIVE BOX
PGY-1 Progress Note discussed with attending    PAGER #: [[phone]] TILL 5:00 PM  PLEASE CONTACT ON CALL TEAM:   - On Call Team (Please refer to Dustin) FROM 5:00 PM - 8:30PM  - Nightfloat Team FROM 8:30 -7:30 AM    INTERVAL HPI/OVERNIGHT EVENTS:   No acute overnight events. Pt states he would like a sleeping pill stronger than melatonin as it does not help. Pt is restless and would like to go to rehab. Explained risks and worsening kidney function on labwork. Patient also refusing certain medications such as heparin, sodium bicarb and metoprolol.     REVIEW OF SYSTEMS:  CONSTITUTIONAL: No fever, weight loss, or fatigue  RESPIRATORY: No cough, wheezing, chills or hemoptysis; No shortness of breath  CARDIOVASCULAR: No chest pain, palpitations, dizziness, or leg swelling  GASTROINTESTINAL: No abdominal pain. No nausea, vomiting, or hematemesis; No diarrhea or constipation. No melena or hematochezia.  GENITOURINARY: No dysuria or hematuria, urinary frequency  NEUROLOGICAL: No headaches, memory loss, loss of strength, numbness, or tremors  SKIN: No itching, burning, rashes, or lesions     MEDICATIONS  (STANDING):  calcitriol   Capsule 0.25 MICROGram(s) Oral <User Schedule>  ferrous    sulfate 325 milliGRAM(s) Oral daily  finasteride 5 milliGRAM(s) Oral daily  heparin   Injectable 5000 Unit(s) SubCutaneous every 12 hours  lidocaine   4% Patch 1 Patch Transdermal daily  melatonin 5 milliGRAM(s) Oral at bedtime  metoprolol tartrate 12.5 milliGRAM(s) Oral two times a day  mirtazapine 7.5 milliGRAM(s) Oral at bedtime  sodium bicarbonate 1300 milliGRAM(s) Oral two times a day  sodium chloride 0.45%. 1000 milliLiter(s) (80 mL/Hr) IV Continuous <Continuous>  tamsulosin 0.4 milliGRAM(s) Oral at bedtime  tiotropium 2.5 MICROgram(s) Inhaler 2 Puff(s) Inhalation daily    MEDICATIONS  (PRN):  acetaminophen     Tablet .. 650 milliGRAM(s) Oral every 6 hours PRN Temp greater or equal to 38C (100.4F), Mild Pain (1 - 3)  zaleplon 5 milliGRAM(s) Oral once PRN Insomnia      Vital Signs Last 24 Hrs  T(C): 36.8 (05 May 2023 15:52), Max: 37.8 (04 May 2023 20:32)  T(F): 98.2 (05 May 2023 15:52), Max: 100 (04 May 2023 20:32)  HR: 108 (05 May 2023 15:52) (94 - 116)  BP: 113/56 (05 May 2023 15:52) (101/39 - 126/53)  BP(mean): --  RR: 18 (05 May 2023 15:52) (18 - 18)  SpO2: 100% (05 May 2023 15:52) (98% - 100%)    Parameters below as of 05 May 2023 15:52  Patient On (Oxygen Delivery Method): room air        PHYSICAL EXAMINATION:  GENERAL: NAD, elderly frail male  HEAD:  Atraumatic, Normocephalic  EYES:  conjunctiva and sclera clear  NECK: Supple, No JVD, Normal thyroid  CHEST/LUNG: + mild bibasilar crackles. Clear to percussion bilaterally; No rales, rhonchi, wheezing, or rubs  HEART: Regular rate and rhythm; No murmurs, rubs, or gallops  ABDOMEN: Soft, Nontender, Nondistended; Bowel sounds present  NERVOUS SYSTEM:  Alert & Oriented X3  EXTREMITIES:  2+ Peripheral Pulses, No clubbing, cyanosis, or edema  SKIN: warm dry, + bajwa                          8.3    8.62  )-----------( 254      ( 05 May 2023 06:29 )             26.3     05-05    135  |  107  |  46<H>  ----------------------------<  99  5.0   |  19<L>  |  4.35<H>    Ca    9.5      05 May 2023 06:29  Phos  4.3     05-05  Mg     2.0     05-05    TPro  7.3  /  Alb  2.6<L>  /  TBili  0.4  /  DBili  x   /  AST  20  /  ALT  18  /  AlkPhos  56  05-05    LIVER FUNCTIONS - ( 05 May 2023 06:29 )  Alb: 2.6 g/dL / Pro: 7.3 g/dL / ALK PHOS: 56 U/L / ALT: 18 U/L DA / AST: 20 U/L / GGT: x                       I&O's Summary    04 May 2023 07:01  -  05 May 2023 07:00  --------------------------------------------------------  IN: 0 mL / OUT: 750 mL / NET: -750 mL    05 May 2023 07:01  -  05 May 2023 16:36  --------------------------------------------------------  IN: 400 mL / OUT: 200 mL / NET: 200 mL

## 2023-05-05 NOTE — PROGRESS NOTE ADULT - PROBLEM SELECTOR PLAN 1
patient presents with elevated Cr to 5.3, unclear baseline however recently elevated up to 6.8 and improving since prior admission  associated with urinary retention  bajwa placed by urology overnight per patient request in ED  CT shows renal pelvis fullness of bilateral renal pelvis without calcified stone identified along the course the ureter. Bladder wall thickening. Prostate Enlargement.  - maintain bajwa on discharge  - monitor Cr  - avoid nephrotoxic agents  - SCr worsening, no signs of bladder obstruction, continue to monitor  - Dr. Augustine Sanchez following

## 2023-05-05 NOTE — PROGRESS NOTE ADULT - SUBJECTIVE AND OBJECTIVE BOX
PULMONARY CONSULT SERVICE FOLLOW-UP NOTE    INTERVAL HPI:  Reviewed chart and overnight events; patient seen and examined at bedside. No respiratory complaints this AM. Asks if his bicarb tablets can be switched to IV because they are hard to swallow even when crushed or cut in pieces. Still awaiting rehab.     MEDICATIONS:  Pulmonary:  tiotropium 2.5 MICROgram(s) Inhaler 2 Puff(s) Inhalation daily    Antimicrobials:    Anticoagulants:  heparin   Injectable 5000 Unit(s) SubCutaneous every 12 hours    Cardiac:  metoprolol tartrate 12.5 milliGRAM(s) Oral two times a day    Allergies    No Known Allergies    Intolerances    Vital Signs Last 24 Hrs  T(C): 36.3 (05 May 2023 11:02), Max: 37.8 (04 May 2023 20:32)  T(F): 97.3 (05 May 2023 11:02), Max: 100 (04 May 2023 20:32)  HR: 98 (05 May 2023 11:02) (94 - 116)  BP: 101/39 (05 May 2023 11:02) (95/83 - 126/53)  BP(mean): --  RR: 18 (05 May 2023 11:02) (18 - 18)  SpO2: 100% (05 May 2023 11:02) (98% - 100%)    Parameters below as of 05 May 2023 11:02  Patient On (Oxygen Delivery Method): room air    05-04 @ 07:01  -  05-05 @ 07:00  --------------------------------------------------------  IN: 0 mL / OUT: 750 mL / NET: -750 mL    05-05 @ 07:01  -  05-05 @ 14:23  --------------------------------------------------------  IN: 400 mL / OUT: 200 mL / NET: 200 mL    PHYSICAL EXAM:  Constitutional: frail and elderly man resting semirecumbent in bed; NAD  HEENT: NC/AT; PERRL, anicteric sclera; MMM  Neck: supple  Cardiovascular: +S1/S2, RRR  Respiratory: few velcro-like crackles in mid posterior fields, globally diminished BS otherwise with good air entry; respirations overall non-labored on ambient air  Gastrointestinal: soft, NT/ND  Extremities: WWP; no edema, clubbing or cyanosis  Vascular: 2+ radial pulses B/L  Neurological: awake and alert; ROMAN    LABS:  CBC Full  -  ( 05 May 2023 06:29 )  WBC Count : 8.62 K/uL  RBC Count : 2.70 M/uL  Hemoglobin : 8.3 g/dL  Hematocrit : 26.3 %  Platelet Count - Automated : 254 K/uL  Mean Cell Volume : 97.4 fl  Mean Cell Hemoglobin : 30.7 pg  Mean Cell Hemoglobin Concentration : 31.6 gm/dL  Auto Neutrophil # : x  Auto Lymphocyte # : x  Auto Monocyte # : x  Auto Eosinophil # : x  Auto Basophil # : x  Auto Neutrophil % : x  Auto Lymphocyte % : x  Auto Monocyte % : x  Auto Eosinophil % : x  Auto Basophil % : x    05-05    135  |  107  |  46<H>  ----------------------------<  99  5.0   |  19<L>  |  4.35<H>    Ca    9.5      05 May 2023 06:29  Phos  4.3     05-05  Mg     2.0     05-05    TPro  7.3  /  Alb  2.6<L>  /  TBili  0.4  /  DBili  x   /  AST  20  /  ALT  18  /  AlkPhos  56  05-05    RADIOLOGY & ADDITIONAL STUDIES:  No interval chest study for review

## 2023-05-05 NOTE — PROGRESS NOTE ADULT - PROBLEM SELECTOR PLAN 5
pt with sinus tachy to 140s on tele  possibly 2/2 to albuterol/duonebs  DC levalbuterol due to persistent tachycardia  started metoprolol 12.5mg BID, however patient has been refusing

## 2023-05-05 NOTE — PROGRESS NOTE ADULT - ASSESSMENT
87 year old M from home with no known PMH presents with urinary retention. Patient reports since being discharged he has had very little urine output. Denies any blood in urine or pain. Denies fever, chills, cp, sob, palpitations, lightheadedness, dizziness, n/v/d, abdominal pain, constipation.  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  5) Anemia of chronic kidney disease  6) Renal osteodystrophy  7) NAGMA  8) BPH with LUTs  9) Dysphagia with improvement      Recommend:  Strict I/o  Avoid Nephrotoxic agents  S cr 4.4 with non oliguria  K level 5  IV/Po hydration  Low K/Low phos renal diet  Continue  Lomax's catheter with flush  Monitor BMP and electrolytes daily  Continue Flomax and finasteride  CT abdomen and pelvis without contrast results reviewed  Urology/pulmonary follow up  Anemia panel / CKD work up reviewed  Continue sodium bicarbonate 1300 mg po bid with meals with goal serum bicarbonate>22  Continue po calcitriol 0.25 mcg po TIW  Iron studies/Hgb/Hct reviewed  Ferrous sulfate/EPO as per ordered  Volume status and electrolytes noted  No urgent need for RRT/HD  Discharge planning per primary team to [facility]  Out patient renal follow up on discharge  Will follow

## 2023-05-05 NOTE — PROGRESS NOTE ADULT - ASSESSMENT
87 year old M from home with PMH of pulmonary fibrosis, pertinent recent admission to [facility] ICU (4/23-4/26) for close monitoring. CT show pulmonary fibrosis with pneumomediastinum and small right apical PTX . He was initially started on BiPaP changed to NC. NAGMA due to SANDI. Pt left AMA. Pt presents with urinary retention and acute hyperkalemia admitted to telemetry for management of hyperkalemia and SANDI.

## 2023-05-05 NOTE — PROGRESS NOTE ADULT - PROBLEM SELECTOR PLAN 4
Resolved.   pt with NAGMA, similar to previous admission  HCO3 16 likely due to acute renal failure  c/w sodium bicarbonate and calcitriol  - patient has been refusing meds including sodium bicarb  Nephro following

## 2023-05-05 NOTE — PROGRESS NOTE ADULT - SUBJECTIVE AND OBJECTIVE BOX
Rafael Bradshaw MD (Nephrology progress note)  [address], [facility],  [address],  [facility]  TEl: [phone]  Cell: [phone]    Patient is a 87y Male seen and evaluated at bedside. Vital signs, laboratory data, imaging studies, consult notes reviewed done within past 24 hours. Overnight events noted.    Allergies    No Known Allergies    Intolerances        ROS:  CONSTITUTIONAL: No fever or chills.  HEENT: No headcahe or visual disturbances.  RESPIRATORY: No shortness of breath, cough, hemoptysis or wheezing  CARDIOVASCULAR: No Chest pain, shortness of breath, palpitations, dizziness, syncope, orthopnea, PND or leg swelling.  GASTROINTESTINAL: No abdominal pain, nausea, vomiting, diarrhea, hematemesis or blood per rectum.  GENITOURINARY: No urinary frequency, urgency, gross hematuria, dysuria, flank or supra pubic pain, difficulty passing urine.  NEUROLOGICAL: No headache, focal limb weakness, tingling or numbness or seizure like activity  SKIN: No skin rash or lesion  MUSCULOSKELETAL: No leg pain, calf pain or swelling    VITALS:    T(C): 36.4 (05-05-23 @ 07:27), Max: 37.8 (05-04-23 @ 20:32)  HR: 96 (05-05-23 @ 07:27) (94 - 116)  BP: 104/60 (05-05-23 @ 07:27) (95/83 - 126/53)  RR: 18 (05-05-23 @ 07:27) (18 - 18)  SpO2: 98% (05-05-23 @ 07:27) (98% - 99%)  CAPILLARY BLOOD GLUCOSE          05-04-23 @ 07:01  -  05-05-23 @ 07:00  --------------------------------------------------------  IN: 0 mL / OUT: 750 mL / NET: -750 mL    05-05-23 @ 07:01  -  05-05-23 @ 10:39  --------------------------------------------------------  IN: 200 mL / OUT: 0 mL / NET: 200 mL      MEDICATIONS  (STANDING):  calcitriol   Capsule 0.25 MICROGram(s) Oral <User Schedule>  ferrous    sulfate 325 milliGRAM(s) Oral daily  finasteride 5 milliGRAM(s) Oral daily  heparin   Injectable 5000 Unit(s) SubCutaneous every 12 hours  lidocaine   4% Patch 1 Patch Transdermal daily  melatonin 5 milliGRAM(s) Oral at bedtime  metoprolol tartrate 12.5 milliGRAM(s) Oral two times a day  sodium bicarbonate 1300 milliGRAM(s) Oral two times a day  sodium chloride 0.45%. 1000 milliLiter(s) (80 mL/Hr) IV Continuous <Continuous>  tamsulosin 0.4 milliGRAM(s) Oral at bedtime  tiotropium 2.5 MICROgram(s) Inhaler 2 Puff(s) Inhalation daily    MEDICATIONS  (PRN):  acetaminophen     Tablet .. 650 milliGRAM(s) Oral every 6 hours PRN Temp greater or equal to 38C (100.4F), Mild Pain (1 - 3)  zaleplon 5 milliGRAM(s) Oral once PRN Insomnia      PHYSICAL EXAM:  GENERAL: Alert, awake and oriented x3 in no distress  HEENT: MAGALYS, EOMI, neck supple, no JVP, no carotid bruit, oral mucosa moist and pink.  CHEST/LUNG: Bilateral clear breath sounds, no rales, no crepitations, no rhonchi, no wheezing  HEART: Regular rate and rhythm, CHANTAL II/VI at LPSB, no gallops, no rub   ABDOMEN: Soft, nontender, non distended, bowel sounds present, no palpable organomegaly  : No flank or supra pubic tenderness.  EXTREMITIES: Peripheral pulses are palpable, no pedal edema  Neurology: AAOx3, no focal neurological deficit  SKIN: No rash or skin lesion  Musculoskeletal: No joint deformity or spinal tenderness.      Vascular ACCESS:     LABS:                        8.3    8.62  )-----------( 254      ( 05 May 2023 06:29 )             26.3     05-05    135  |  107  |  46<H>  ----------------------------<  99  5.0   |  19<L>  |  4.35<H>    Ca    9.5      05 May 2023 06:29  Phos  4.3     05-05  Mg     2.0     05-05    TPro  7.3  /  Alb  2.6<L>  /  TBili  0.4  /  DBili  x   /  AST  20  /  ALT  18  /  AlkPhos  56  05-05                RADIOLOGY & ADDITIONAL STUDIES:    Imaging Personally Reviewed:  [x] YES  [ ] NO    Consultant(s) Notes Reviewed:  [x] YES  [ ] NO    Care Discussed with Primary team/ Other Providers [x] YES  [ ] NO       Rafeal Bradshaw MD (Nephrology progress note)  [address], [facility],  [address],  [facility]  TEl: [phone]  Cell: [phone]    Patient is a 87y Male seen and evaluated at bedside. Vital signs, laboratory data, imaging studies, consult notes reviewed done within past 24 hours. Overnight events noted. Patient lying in bed alert and awake want to go to rehab. Interval worsening renal function with S cr 4.3 with non oliguria. K level with improvement to 5    Allergies    No Known Allergies    Intolerances        ROS:  CONSTITUTIONAL: No fever or chills.  HEENT: No headache or visual disturbances.  RESPIRATORY: No shortness of breath, cough, hemoptysis or wheezing  CARDIOVASCULAR: No Chest pain or SOb  GASTROINTESTINAL: No abdominal pain, nausea, vomiting, diarrhea, hematemesis or blood per rectum.  GENITOURINARY: No flank or supra pubic pain.  NEUROLOGICAL: No headache, focal limb weakness, tingling or numbness  SKIN: No skin rash or lesion  MUSCULOSKELETAL: No leg pain, calf pain or swelling    VITALS:    T(C): 36.4 (05-05-23 @ 07:27), Max: 37.8 (05-04-23 @ 20:32)  HR: 96 (05-05-23 @ 07:27) (94 - 116)  BP: 104/60 (05-05-23 @ 07:27) (95/83 - 126/53)  RR: 18 (05-05-23 @ 07:27) (18 - 18)  SpO2: 98% (05-05-23 @ 07:27) (98% - 99%)  CAPILLARY BLOOD GLUCOSE          05-04-23 @ 07:01  -  05-05-23 @ 07:00  --------------------------------------------------------  IN: 0 mL / OUT: 750 mL / NET: -750 mL    05-05-23 @ 07:01  -  05-05-23 @ 10:39  --------------------------------------------------------  IN: 200 mL / OUT: 0 mL / NET: 200 mL      MEDICATIONS  (STANDING):  calcitriol   Capsule 0.25 MICROGram(s) Oral <User Schedule>  ferrous    sulfate 325 milliGRAM(s) Oral daily  finasteride 5 milliGRAM(s) Oral daily  heparin   Injectable 5000 Unit(s) SubCutaneous every 12 hours  lidocaine   4% Patch 1 Patch Transdermal daily  melatonin 5 milliGRAM(s) Oral at bedtime  metoprolol tartrate 12.5 milliGRAM(s) Oral two times a day  sodium bicarbonate 1300 milliGRAM(s) Oral two times a day  sodium chloride 0.45%. 1000 milliLiter(s) (80 mL/Hr) IV Continuous <Continuous>  tamsulosin 0.4 milliGRAM(s) Oral at bedtime  tiotropium 2.5 MICROgram(s) Inhaler 2 Puff(s) Inhalation daily    MEDICATIONS  (PRN):  acetaminophen     Tablet .. 650 milliGRAM(s) Oral every 6 hours PRN Temp greater or equal to 38C (100.4F), Mild Pain (1 - 3)  zaleplon 5 milliGRAM(s) Oral once PRN Insomnia      PHYSICAL EXAM:  GENERAL: Alert, awake and oriented x2-3 in no distress  HEENT: MAGALYS, EOMI, neck supple, no JVP, no carotid bruit, oral mucosa moist and pale  CHEST/LUNG: Bilateral decreased breath sounds, no rales, no rhonchi, no wheezing  HEART: Regular rate and rhythm, CHANTAL II/VI at LPSB, no gallops, no rub   ABDOMEN: Soft, nontender, non distended, bowel sounds present, no palpable organomegaly  : No flank or supra pubic tenderness.  EXTREMITIES: Peripheral pulses are palpable, no pedal edema  Neurology: AAOx2-3, no focal neurological deficit  SKIN: No rash or skin lesion  Musculoskeletal: No joint deformity or spinal tenderness.      Vascular ACCESS: None    LABS:                        8.3    8.62  )-----------( 254      ( 05 May 2023 06:29 )             26.3     05-05    135  |  107  |  46<H>  ----------------------------<  99  5.0   |  19<L>  |  4.35<H>    Ca    9.5      05 May 2023 06:29  Phos  4.3     05-05  Mg     2.0     05-05    TPro  7.3  /  Alb  2.6<L>  /  TBili  0.4  /  DBili  x   /  AST  20  /  ALT  18  /  AlkPhos  56  05-05                RADIOLOGY & ADDITIONAL STUDIES:    Imaging Personally Reviewed:  [x] YES  [ ] NO    Consultant(s) Notes Reviewed:  [x] YES  [ ] NO    Care Discussed with Primary team/ Other Providers [x] YES  [ ] NO

## 2023-05-05 NOTE — PROGRESS NOTE ADULT - PROBLEM SELECTOR PLAN 3
- pt with hyperkalemia on labs  - given insulin, D50 and lokelma  - Will DC on lokelma for 3 days and will need repeat outpatient labs in 1 week  - potassium levels stable today  - f/u outpatient

## 2023-05-06 LAB
ALBUMIN SERPL ELPH-MCNC: 2.5 G/DL — LOW (ref 3.5–5)
ALP SERPL-CCNC: 62 U/L — SIGNIFICANT CHANGE UP (ref 40–120)
ALT FLD-CCNC: 16 U/L DA — SIGNIFICANT CHANGE UP (ref 10–60)
ANION GAP SERPL CALC-SCNC: 10 MMOL/L — SIGNIFICANT CHANGE UP (ref 5–17)
APPEARANCE UR: ABNORMAL
AST SERPL-CCNC: 14 U/L — SIGNIFICANT CHANGE UP (ref 10–40)
BILIRUB SERPL-MCNC: 0.7 MG/DL — SIGNIFICANT CHANGE UP (ref 0.2–1.2)
BILIRUB UR-MCNC: NEGATIVE — SIGNIFICANT CHANGE UP
BUN SERPL-MCNC: 51 MG/DL — HIGH (ref 7–18)
CALCIUM SERPL-MCNC: 9.3 MG/DL — SIGNIFICANT CHANGE UP (ref 8.4–10.5)
CHLORIDE SERPL-SCNC: 105 MMOL/L — SIGNIFICANT CHANGE UP (ref 96–108)
CO2 SERPL-SCNC: 20 MMOL/L — LOW (ref 22–31)
COLOR SPEC: YELLOW — SIGNIFICANT CHANGE UP
CREAT SERPL-MCNC: 4.54 MG/DL — HIGH (ref 0.5–1.3)
DIFF PNL FLD: ABNORMAL
EGFR: 12 ML/MIN/1.73M2 — LOW
GLUCOSE SERPL-MCNC: 99 MG/DL — SIGNIFICANT CHANGE UP (ref 70–99)
GLUCOSE UR QL: NEGATIVE — SIGNIFICANT CHANGE UP
HCT VFR BLD CALC: 24.6 % — LOW (ref 39–50)
HGB BLD-MCNC: 8 G/DL — LOW (ref 13–17)
KETONES UR-MCNC: ABNORMAL
LEUKOCYTE ESTERASE UR-ACNC: ABNORMAL
MAGNESIUM SERPL-MCNC: 1.9 MG/DL — SIGNIFICANT CHANGE UP (ref 1.6–2.6)
MCHC RBC-ENTMCNC: 31.1 PG — SIGNIFICANT CHANGE UP (ref 27–34)
MCHC RBC-ENTMCNC: 32.5 GM/DL — SIGNIFICANT CHANGE UP (ref 32–36)
MCV RBC AUTO: 95.7 FL — SIGNIFICANT CHANGE UP (ref 80–100)
NITRITE UR-MCNC: NEGATIVE — SIGNIFICANT CHANGE UP
NRBC # BLD: 0 /100 WBCS — SIGNIFICANT CHANGE UP (ref 0–0)
OSMOLALITY UR: 407 MOS/KG — SIGNIFICANT CHANGE UP (ref 50–1200)
PH UR: 6 — SIGNIFICANT CHANGE UP (ref 5–8)
PHOSPHATE SERPL-MCNC: 4.1 MG/DL — SIGNIFICANT CHANGE UP (ref 2.5–4.5)
PLATELET # BLD AUTO: 221 K/UL — SIGNIFICANT CHANGE UP (ref 150–400)
POTASSIUM SERPL-MCNC: 5.1 MMOL/L — SIGNIFICANT CHANGE UP (ref 3.5–5.3)
POTASSIUM SERPL-SCNC: 5.1 MMOL/L — SIGNIFICANT CHANGE UP (ref 3.5–5.3)
PROT SERPL-MCNC: 7.5 G/DL — SIGNIFICANT CHANGE UP (ref 6–8.3)
PROT UR-MCNC: 30 MG/DL
RBC # BLD: 2.57 M/UL — LOW (ref 4.2–5.8)
RBC # FLD: 13.3 % — SIGNIFICANT CHANGE UP (ref 10.3–14.5)
SODIUM SERPL-SCNC: 135 MMOL/L — SIGNIFICANT CHANGE UP (ref 135–145)
SODIUM UR-SCNC: 71 MMOL/L — SIGNIFICANT CHANGE UP
SP GR SPEC: 1.01 — SIGNIFICANT CHANGE UP (ref 1.01–1.02)
UROBILINOGEN FLD QL: NEGATIVE — SIGNIFICANT CHANGE UP
WBC # BLD: 10.41 K/UL — SIGNIFICANT CHANGE UP (ref 3.8–10.5)
WBC # FLD AUTO: 10.41 K/UL — SIGNIFICANT CHANGE UP (ref 3.8–10.5)

## 2023-05-06 PROCEDURE — 99232 SBSQ HOSP IP/OBS MODERATE 35: CPT

## 2023-05-06 RX ORDER — SODIUM CHLORIDE 9 MG/ML
1000 INJECTION, SOLUTION INTRAVENOUS
Refills: 0 | Status: DISCONTINUED | OUTPATIENT
Start: 2023-05-06 | End: 2023-05-07

## 2023-05-06 RX ORDER — QUETIAPINE FUMARATE 200 MG/1
25 TABLET, FILM COATED ORAL ONCE
Refills: 0 | Status: COMPLETED | OUTPATIENT
Start: 2023-05-06 | End: 2023-05-06

## 2023-05-06 RX ADMIN — LIDOCAINE 1 PATCH: 4 CREAM TOPICAL at 13:52

## 2023-05-06 RX ADMIN — Medication 12.5 MILLIGRAM(S): at 05:31

## 2023-05-06 RX ADMIN — Medication 1300 MILLIGRAM(S): at 05:31

## 2023-05-06 RX ADMIN — QUETIAPINE FUMARATE 25 MILLIGRAM(S): 200 TABLET, FILM COATED ORAL at 04:06

## 2023-05-06 RX ADMIN — Medication 325 MILLIGRAM(S): at 13:52

## 2023-05-06 RX ADMIN — LIDOCAINE 1 PATCH: 4 CREAM TOPICAL at 00:15

## 2023-05-06 RX ADMIN — TIOTROPIUM BROMIDE 2 PUFF(S): 18 CAPSULE ORAL; RESPIRATORY (INHALATION) at 13:54

## 2023-05-06 RX ADMIN — HEPARIN SODIUM 5000 UNIT(S): 5000 INJECTION INTRAVENOUS; SUBCUTANEOUS at 05:32

## 2023-05-06 RX ADMIN — FINASTERIDE 5 MILLIGRAM(S): 5 TABLET, FILM COATED ORAL at 13:52

## 2023-05-06 RX ADMIN — LIDOCAINE 1 PATCH: 4 CREAM TOPICAL at 18:01

## 2023-05-06 NOTE — PROGRESS NOTE ADULT - SUBJECTIVE AND OBJECTIVE BOX
Rafael Bradshaw MD (Nephrology progress note)  [address], [facility] [place],  [address],  [facility]  TEl: [phone]  Cell: [phone]    Patient is a 87y Male seen and evaluated at bedside. Vital signs, laboratory data, imaging studies, consult notes reviewed done within past 24 hours. Overnight events noted. Patient drowsy but arousable lying in bed in no respiratory distress. Interval worsening renal failure with S cr 4.5 with non oliguria. Remains with poor oral intake. K level 5.1    Allergies    No Known Allergies    Intolerances        ROS:  Limited  Drowsy but arousable   Denies any chest pain, SOB  Denies abdominal pain, nausea, vomiting    VITALS:    T(C): 36.5 (05-06-23 @ 11:17), Max: 37.1 (05-05-23 @ 19:46)  HR: 109 (05-06-23 @ 07:10) (105 - 109)  BP: 101/51 (05-06-23 @ 11:17) (101/51 - 141/68)  RR: 18 (05-06-23 @ 11:17) (18 - 19)  SpO2: 100% (05-06-23 @ 11:17) (90% - 100%)  CAPILLARY BLOOD GLUCOSE          05-05-23 @ 07:01  -  05-06-23 @ 07:00  --------------------------------------------------------  IN: 400 mL / OUT: 700 mL / NET: -300 mL    05-06-23 @ 07:01  -  05-06-23 @ 15:34  --------------------------------------------------------  IN: 0 mL / OUT: 200 mL / NET: -200 mL      MEDICATIONS  (STANDING):  calcitriol   Capsule 0.25 MICROGram(s) Oral <User Schedule>  ferrous    sulfate 325 milliGRAM(s) Oral daily  finasteride 5 milliGRAM(s) Oral daily  heparin   Injectable 5000 Unit(s) SubCutaneous every 12 hours  lidocaine   4% Patch 1 Patch Transdermal daily  melatonin 5 milliGRAM(s) Oral at bedtime  metoprolol tartrate 12.5 milliGRAM(s) Oral two times a day  sodium bicarbonate 1300 milliGRAM(s) Oral two times a day  sodium chloride 0.45%. 1000 milliLiter(s) (80 mL/Hr) IV Continuous <Continuous>  tamsulosin 0.4 milliGRAM(s) Oral at bedtime  tiotropium 2.5 MICROgram(s) Inhaler 2 Puff(s) Inhalation daily    MEDICATIONS  (PRN):  acetaminophen     Tablet .. 650 milliGRAM(s) Oral every 6 hours PRN Temp greater or equal to 38C (100.4F), Mild Pain (1 - 3)      PHYSICAL EXAM:  GENERAL: Alert, awake and oriented x2-3 in no distress  HEENT: MAGALYS, EOMI, neck supple, no JVP  CHEST/LUNG: Bilateral clear breath sounds, no rales, no crepitations, no rhonchi, no wheezing  HEART: Regular rate and rhythm, CHANTAL II/VI at LPSB, no gallops, no rub   ABDOMEN: Soft, nontender, non distended, bowel sounds present  : No flank or supra pubic tenderness, Lomax's cathter present  EXTREMITIES: Peripheral pulses are palpable, no pedal edema  Neurology: AAOx2-3, no focal neurological deficit  SKIN: No rash or skin lesion  Musculoskeletal: No joint deformity or spinal tenderness.      Vascular ACCESS:  None    LABS:                        8.0    10.41 )-----------( 221      ( 06 May 2023 06:36 )             24.6     05-06    135  |  105  |  51<H>  ----------------------------<  99  5.1   |  20<L>  |  4.54<H>    Ca    9.3      06 May 2023 06:36  Phos  4.1     05-06  Mg     1.9     05-06    TPro  7.5  /  Alb  2.5<L>  /  TBili  0.7  /  DBili  x   /  AST  14  /  ALT  16  /  AlkPhos  62  05-06                RADIOLOGY & ADDITIONAL STUDIES:  rad  Imaging Personally Reviewed:  [x] YES  [ ] NO    Consultant(s) Notes Reviewed:  [x] YES  [ ] NO    Care Discussed with Primary team/ Other Providers [x] YES  [ ] NO

## 2023-05-06 NOTE — PROGRESS NOTE ADULT - PROBLEM SELECTOR PLAN 1
patient presents with elevated Cr to 5.3, unclear baseline however recently elevated up to 6.8 and improving since prior admission  associated with urinary retention  bajwa placed by urology overnight per patient request in ED  CT shows renal pelvis fullness of bilateral renal pelvis without calcified stone identified along the course the ureter. Bladder wall thickening. Prostate Enlargement.  - maintain bajwa on discharge  - monitor Cr  - avoid nephrotoxic agents  - SCr still worsening, no signs of bladder obstruction, continue to monitor  - Dr. Augustine Sanchez following

## 2023-05-06 NOTE — PROGRESS NOTE ADULT - ASSESSMENT
87 year old M from home with no known PMH presents with urinary retention. Patient reports since being discharged he has had very little urine output. Denies any blood in urine or pain. Denies fever, chills, cp, sob, palpitations, lightheadedness, dizziness, n/v/d, abdominal pain, constipation.  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  5) Anemia of chronic kidney disease  6) Renal osteodystrophy  7) NAGMA  8) BPH with LUTs  9) Dysphagia with improvement      Recommend:  Strict I/o  Avoid Nephrotoxic agents  S cr 4.5 with non oliguria  K level 5.1  IV/Po hydration  Low K/Low phos renal diet  Continue  Lomax's catheter with flush  Monitor BMP and electrolytes daily  Continue Flomax and finasteride  CT abdomen and pelvis without contrast results reviewed  Urology/pulmonary follow up  Anemia panel / CKD work up reviewed  Continue sodium bicarbonate 1300 mg po bid with meals with goal serum bicarbonate>22  Continue po calcitriol 0.25 mcg po TIW  Iron studies/Hgb/Hct reviewed  Ferrous sulfate/EPO as per ordered  Volume status and electrolytes noted  No urgent need for RRT/HD  Discharge planning per primary team to [facility] when stable to do so  Out patient renal follow up on discharge  Will follow

## 2023-05-06 NOTE — PROGRESS NOTE ADULT - ATTENDING COMMENTS
87M from home with PMH of pulmonary fibrosis recent ICU admission with pneumothorax and urinary retention then leaving AMA presents with urinary retention and abdominal discomfort found to have acute hyperkalemia and SANDI. S/p bajwa placement with improvement in Cr and K. Course complicated by sinus tach, suspect due to overuse of Duoneb. Pending [facility]    Pt seen and examined, no new complaints.     Labs reviewed- cbc, bmp, LFTs     A/P:  SANDI likely ATN, obstructive, improving s/p Bajwa  Hyperkalemia - resolved   NAGMA   Acute urinary retention  Sinus tach- improved   Pulm fibrosis  AHRF- resolved     -Patient's Cr still up-trending, will c/w IVF. Will continue to monitor till kidney function stabilizes.   -C/w sodium bicarb   -C/w Bajwa cathter, monitor I&O   -C/w mirtazapine to help w/ sleep and for its appetite stimulating effect.

## 2023-05-06 NOTE — PROGRESS NOTE ADULT - SUBJECTIVE AND OBJECTIVE BOX
PGY-1 Progress Note discussed with attending    PAGER #: [[phone]] TILL 5:00 PM  PLEASE CONTACT ON CALL TEAM:   - On Call Team (Please refer to Dustin) FROM 5:00 PM - 8:30PM  - Nightfloat Team FROM 8:30 -7:30 AM    INTERVAL HPI/OVERNIGHT EVENTS:       REVIEW OF SYSTEMS:  CONSTITUTIONAL: No fever, weight loss, or fatigue  RESPIRATORY: No cough, wheezing, chills or hemoptysis; No shortness of breath  CARDIOVASCULAR: No chest pain, palpitations, dizziness, or leg swelling  GASTROINTESTINAL: No abdominal pain. No nausea, vomiting, or hematemesis; No diarrhea or constipation. No melena or hematochezia.  GENITOURINARY: No dysuria or hematuria, urinary frequency  NEUROLOGICAL: No headaches, memory loss, loss of strength, numbness, or tremors  SKIN: No itching, burning, rashes, or lesions     MEDICATIONS  (STANDING):  calcitriol   Capsule 0.25 MICROGram(s) Oral <User Schedule>  ferrous    sulfate 325 milliGRAM(s) Oral daily  finasteride 5 milliGRAM(s) Oral daily  heparin   Injectable 5000 Unit(s) SubCutaneous every 12 hours  lidocaine   4% Patch 1 Patch Transdermal daily  melatonin 5 milliGRAM(s) Oral at bedtime  metoprolol tartrate 12.5 milliGRAM(s) Oral two times a day  mirtazapine 7.5 milliGRAM(s) Oral at bedtime  sodium bicarbonate 1300 milliGRAM(s) Oral two times a day  sodium chloride 0.45%. 1000 milliLiter(s) (80 mL/Hr) IV Continuous <Continuous>  tamsulosin 0.4 milliGRAM(s) Oral at bedtime  tiotropium 2.5 MICROgram(s) Inhaler 2 Puff(s) Inhalation daily    MEDICATIONS  (PRN):  acetaminophen     Tablet .. 650 milliGRAM(s) Oral every 6 hours PRN Temp greater or equal to 38C (100.4F), Mild Pain (1 - 3)      Vital Signs Last 24 Hrs  T(C): 36.7 (06 May 2023 07:10), Max: 37.1 (05 May 2023 19:46)  T(F): 98.1 (06 May 2023 07:10), Max: 98.8 (05 May 2023 19:46)  HR: 109 (06 May 2023 07:10) (98 - 109)  BP: 107/51 (06 May 2023 07:10) (101/39 - 141/68)  BP(mean): --  RR: 19 (06 May 2023 07:10) (18 - 19)  SpO2: 90% (06 May 2023 07:10) (90% - 100%)    Parameters below as of 06 May 2023 07:10  Patient On (Oxygen Delivery Method): room air        PHYSICAL EXAMINATION:  GENERAL: NAD, well built  HEAD:  Atraumatic, Normocephalic  EYES:  conjunctiva and sclera clear  NECK: Supple, No JVD, Normal thyroid  CHEST/LUNG: Clear to auscultation. Clear to percussion bilaterally; No rales, rhonchi, wheezing, or rubs  HEART: Regular rate and rhythm; No murmurs, rubs, or gallops  ABDOMEN: Soft, Nontender, Nondistended; Bowel sounds present  NERVOUS SYSTEM:  Alert & Oriented X3,    EXTREMITIES:  2+ Peripheral Pulses, No clubbing, cyanosis, or edema  SKIN: warm dry                          8.0    10.41 )-----------( 221      ( 06 May 2023 06:36 )             24.6     05-06    135  |  105  |  51<H>  ----------------------------<  99  5.1   |  20<L>  |  4.54<H>    Ca    9.3      06 May 2023 06:36  Phos  4.1     05-06  Mg     1.9     05-06    TPro  7.5  /  Alb  2.5<L>  /  TBili  0.7  /  DBili  x   /  AST  14  /  ALT  16  /  AlkPhos  62  05-06    LIVER FUNCTIONS - ( 06 May 2023 06:36 )  Alb: 2.5 g/dL / Pro: 7.5 g/dL / ALK PHOS: 62 U/L / ALT: 16 U/L DA / AST: 14 U/L / GGT: x                       I&O's Summary    05 May 2023 07:01  -  06 May 2023 07:00  --------------------------------------------------------  IN: 400 mL / OUT: 700 mL / NET: -300 mL        CAPILLARY BLOOD GLUCOSE        CAPILLARY BLOOD GLUCOSE          RADIOLOGY & ADDITIONAL TESTS:                   PGY-1 Progress Note discussed with attending    PAGER #: [[phone]] TILL 5:00 PM  PLEASE CONTACT ON CALL TEAM:   - On Call Team (Please refer to Dustin) FROM 5:00 PM - 8:30PM  - Nightfloat Team FROM 8:30 -7:30 AM    INTERVAL HPI/OVERNIGHT EVENTS:   No acute overnight events. Pt is lethargic today. Pt received Sonata and Remeron at bedtime. Pt also received Seroquel at 4AM by night team. He states he feels good and wants to leave.     REVIEW OF SYSTEMS:  CONSTITUTIONAL: No fever, weight loss, or fatigue  RESPIRATORY: No cough, wheezing, chills or hemoptysis; No shortness of breath  CARDIOVASCULAR: No chest pain, palpitations, dizziness, or leg swelling  GASTROINTESTINAL: No abdominal pain. No nausea, vomiting, or hematemesis; No diarrhea or constipation. No melena or hematochezia.  GENITOURINARY: No dysuria or hematuria, urinary frequency  NEUROLOGICAL: No headaches, memory loss, loss of strength, numbness, or tremors  SKIN: No itching, burning, rashes, or lesions     MEDICATIONS  (STANDING):  calcitriol   Capsule 0.25 MICROGram(s) Oral <User Schedule>  ferrous    sulfate 325 milliGRAM(s) Oral daily  finasteride 5 milliGRAM(s) Oral daily  heparin   Injectable 5000 Unit(s) SubCutaneous every 12 hours  lidocaine   4% Patch 1 Patch Transdermal daily  melatonin 5 milliGRAM(s) Oral at bedtime  metoprolol tartrate 12.5 milliGRAM(s) Oral two times a day  mirtazapine 7.5 milliGRAM(s) Oral at bedtime  sodium bicarbonate 1300 milliGRAM(s) Oral two times a day  sodium chloride 0.45%. 1000 milliLiter(s) (80 mL/Hr) IV Continuous <Continuous>  tamsulosin 0.4 milliGRAM(s) Oral at bedtime  tiotropium 2.5 MICROgram(s) Inhaler 2 Puff(s) Inhalation daily    MEDICATIONS  (PRN):  acetaminophen     Tablet .. 650 milliGRAM(s) Oral every 6 hours PRN Temp greater or equal to 38C (100.4F), Mild Pain (1 - 3)      Vital Signs Last 24 Hrs  T(C): 36.7 (06 May 2023 07:10), Max: 37.1 (05 May 2023 19:46)  T(F): 98.1 (06 May 2023 07:10), Max: 98.8 (05 May 2023 19:46)  HR: 109 (06 May 2023 07:10) (98 - 109)  BP: 107/51 (06 May 2023 07:10) (101/39 - 141/68)  BP(mean): --  RR: 19 (06 May 2023 07:10) (18 - 19)  SpO2: 90% (06 May 2023 07:10) (90% - 100%)    Parameters below as of 06 May 2023 07:10  Patient On (Oxygen Delivery Method): room air        PHYSICAL EXAMINATION:  GENERAL: NAD, elderly frail male  HEAD:  Atraumatic, Normocephalic  EYES:  conjunctiva and sclera clear  NECK: Supple, No JVD, Normal thyroid  CHEST/LUNG: + mild bibasilar crackles. Clear to percussion bilaterally; No rales, rhonchi, wheezing, or rubs  HEART: Regular rate and rhythm; No murmurs, rubs, or gallops  ABDOMEN: Soft, Nontender, Nondistended; Bowel sounds present  NERVOUS SYSTEM:  Alert & Oriented X3  EXTREMITIES:  2+ Peripheral Pulses, No clubbing, cyanosis, or edema  SKIN: warm dry, + bajwa                          8.0    10.41 )-----------( 221      ( 06 May 2023 06:36 )             24.6     05-06    135  |  105  |  51<H>  ----------------------------<  99  5.1   |  20<L>  |  4.54<H>    Ca    9.3      06 May 2023 06:36  Phos  4.1     05-06  Mg     1.9     05-06    TPro  7.5  /  Alb  2.5<L>  /  TBili  0.7  /  DBili  x   /  AST  14  /  ALT  16  /  AlkPhos  62  05-06    LIVER FUNCTIONS - ( 06 May 2023 06:36 )  Alb: 2.5 g/dL / Pro: 7.5 g/dL / ALK PHOS: 62 U/L / ALT: 16 U/L DA / AST: 14 U/L / GGT: x                       I&O's Summary    05 May 2023 07:01  -  06 May 2023 07:00  --------------------------------------------------------  IN: 400 mL / OUT: 700 mL / NET: -300 mL

## 2023-05-06 NOTE — PROGRESS NOTE ADULT - ASSESSMENT
87 year old M from home with PMH of pulmonary fibrosis, pertinent recent admission to [facility] ICU (4/23-4/26) for close monitoring. CT show pulmonary fibrosis with pneumomediastinum and small right apical PTX . He was initially started on BiPaP changed to NC. NAGMA due to SANDI. Pt left AMA. Pt presents with urinary retention and acute hyperkalemia admitted to telemetry for management of hyperkalemia and SANDI.

## 2023-05-07 LAB
ALBUMIN SERPL ELPH-MCNC: 2.3 G/DL — LOW (ref 3.5–5)
ALP SERPL-CCNC: 61 U/L — SIGNIFICANT CHANGE UP (ref 40–120)
ALT FLD-CCNC: 14 U/L DA — SIGNIFICANT CHANGE UP (ref 10–60)
ANION GAP SERPL CALC-SCNC: 11 MMOL/L — SIGNIFICANT CHANGE UP (ref 5–17)
AST SERPL-CCNC: 11 U/L — SIGNIFICANT CHANGE UP (ref 10–40)
BILIRUB SERPL-MCNC: 0.3 MG/DL — SIGNIFICANT CHANGE UP (ref 0.2–1.2)
BUN SERPL-MCNC: 58 MG/DL — HIGH (ref 7–18)
CALCIUM SERPL-MCNC: 9 MG/DL — SIGNIFICANT CHANGE UP (ref 8.4–10.5)
CHLORIDE SERPL-SCNC: 107 MMOL/L — SIGNIFICANT CHANGE UP (ref 96–108)
CO2 SERPL-SCNC: 18 MMOL/L — LOW (ref 22–31)
CREAT SERPL-MCNC: 4.72 MG/DL — HIGH (ref 0.5–1.3)
EGFR: 11 ML/MIN/1.73M2 — LOW
GLUCOSE SERPL-MCNC: 81 MG/DL — SIGNIFICANT CHANGE UP (ref 70–99)
HCT VFR BLD CALC: 23.2 % — LOW (ref 39–50)
HGB BLD-MCNC: 7.5 G/DL — LOW (ref 13–17)
MAGNESIUM SERPL-MCNC: 2.1 MG/DL — SIGNIFICANT CHANGE UP (ref 1.6–2.6)
MCHC RBC-ENTMCNC: 30.9 PG — SIGNIFICANT CHANGE UP (ref 27–34)
MCHC RBC-ENTMCNC: 32.3 GM/DL — SIGNIFICANT CHANGE UP (ref 32–36)
MCV RBC AUTO: 95.5 FL — SIGNIFICANT CHANGE UP (ref 80–100)
NRBC # BLD: 0 /100 WBCS — SIGNIFICANT CHANGE UP (ref 0–0)
PHOSPHATE SERPL-MCNC: 4.9 MG/DL — HIGH (ref 2.5–4.5)
PLATELET # BLD AUTO: 234 K/UL — SIGNIFICANT CHANGE UP (ref 150–400)
POTASSIUM SERPL-MCNC: 4.7 MMOL/L — SIGNIFICANT CHANGE UP (ref 3.5–5.3)
POTASSIUM SERPL-SCNC: 4.7 MMOL/L — SIGNIFICANT CHANGE UP (ref 3.5–5.3)
PROT SERPL-MCNC: 7.2 G/DL — SIGNIFICANT CHANGE UP (ref 6–8.3)
RBC # BLD: 2.43 M/UL — LOW (ref 4.2–5.8)
RBC # FLD: 13.4 % — SIGNIFICANT CHANGE UP (ref 10.3–14.5)
SODIUM SERPL-SCNC: 136 MMOL/L — SIGNIFICANT CHANGE UP (ref 135–145)
WBC # BLD: 8.67 K/UL — SIGNIFICANT CHANGE UP (ref 3.8–10.5)
WBC # FLD AUTO: 8.67 K/UL — SIGNIFICANT CHANGE UP (ref 3.8–10.5)

## 2023-05-07 PROCEDURE — 99232 SBSQ HOSP IP/OBS MODERATE 35: CPT

## 2023-05-07 RX ORDER — MIRTAZAPINE 45 MG/1
7.5 TABLET, ORALLY DISINTEGRATING ORAL AT BEDTIME
Refills: 0 | Status: DISCONTINUED | OUTPATIENT
Start: 2023-05-07 | End: 2023-05-08

## 2023-05-07 RX ORDER — ERYTHROPOIETIN 10000 [IU]/ML
20000 INJECTION, SOLUTION INTRAVENOUS; SUBCUTANEOUS ONCE
Refills: 0 | Status: COMPLETED | OUTPATIENT
Start: 2023-05-07 | End: 2023-05-07

## 2023-05-07 RX ORDER — SODIUM CHLORIDE 9 MG/ML
1000 INJECTION, SOLUTION INTRAVENOUS
Refills: 0 | Status: DISCONTINUED | OUTPATIENT
Start: 2023-05-08 | End: 2023-05-08

## 2023-05-07 RX ORDER — SODIUM CHLORIDE 9 MG/ML
1000 INJECTION, SOLUTION INTRAVENOUS
Refills: 0 | Status: DISCONTINUED | OUTPATIENT
Start: 2023-05-07 | End: 2023-05-08

## 2023-05-07 RX ADMIN — FINASTERIDE 5 MILLIGRAM(S): 5 TABLET, FILM COATED ORAL at 12:01

## 2023-05-07 RX ADMIN — Medication 1300 MILLIGRAM(S): at 18:12

## 2023-05-07 RX ADMIN — TAMSULOSIN HYDROCHLORIDE 0.4 MILLIGRAM(S): 0.4 CAPSULE ORAL at 21:14

## 2023-05-07 RX ADMIN — SODIUM CHLORIDE 75 MILLILITER(S): 9 INJECTION, SOLUTION INTRAVENOUS at 12:00

## 2023-05-07 RX ADMIN — LIDOCAINE 1 PATCH: 4 CREAM TOPICAL at 18:16

## 2023-05-07 RX ADMIN — ERYTHROPOIETIN 20000 UNIT(S): 10000 INJECTION, SOLUTION INTRAVENOUS; SUBCUTANEOUS at 13:44

## 2023-05-07 RX ADMIN — LIDOCAINE 1 PATCH: 4 CREAM TOPICAL at 01:00

## 2023-05-07 RX ADMIN — LIDOCAINE 1 PATCH: 4 CREAM TOPICAL at 23:39

## 2023-05-07 RX ADMIN — HEPARIN SODIUM 5000 UNIT(S): 5000 INJECTION INTRAVENOUS; SUBCUTANEOUS at 06:28

## 2023-05-07 RX ADMIN — LIDOCAINE 1 PATCH: 4 CREAM TOPICAL at 12:01

## 2023-05-07 RX ADMIN — HEPARIN SODIUM 5000 UNIT(S): 5000 INJECTION INTRAVENOUS; SUBCUTANEOUS at 18:12

## 2023-05-07 RX ADMIN — MIRTAZAPINE 7.5 MILLIGRAM(S): 45 TABLET, ORALLY DISINTEGRATING ORAL at 21:14

## 2023-05-07 RX ADMIN — Medication 325 MILLIGRAM(S): at 12:01

## 2023-05-07 RX ADMIN — TIOTROPIUM BROMIDE 2 PUFF(S): 18 CAPSULE ORAL; RESPIRATORY (INHALATION) at 12:01

## 2023-05-07 NOTE — PROGRESS NOTE ADULT - SUBJECTIVE AND OBJECTIVE BOX
Patient is a 87y old  Male who presents with a chief complaint of Hyperkalemia (07 May 2023 11:36)      INTERVAL HPI/OVERNIGHT EVENTS: Patient's Cr continues to up trend, he is still making urine. No other complaints except for low Apetite     MEDICATIONS  (STANDING):  calcitriol   Capsule 0.25 MICROGram(s) Oral <User Schedule>  dextrose 5% + sodium chloride 0.45%. 1000 milliLiter(s) (75 mL/Hr) IV Continuous <Continuous>  ferrous    sulfate 325 milliGRAM(s) Oral daily  finasteride 5 milliGRAM(s) Oral daily  heparin   Injectable 5000 Unit(s) SubCutaneous every 12 hours  lidocaine   4% Patch 1 Patch Transdermal daily  melatonin 5 milliGRAM(s) Oral at bedtime  metoprolol tartrate 12.5 milliGRAM(s) Oral two times a day  sodium bicarbonate 1300 milliGRAM(s) Oral two times a day  tamsulosin 0.4 milliGRAM(s) Oral at bedtime  tiotropium 2.5 MICROgram(s) Inhaler 2 Puff(s) Inhalation daily    MEDICATIONS  (PRN):  acetaminophen     Tablet .. 650 milliGRAM(s) Oral every 6 hours PRN Temp greater or equal to 38C (100.4F), Mild Pain (1 - 3)      __________________________________________________  REVIEW OF SYSTEMS:    CONSTITUTIONAL: No fever, poor Apetite+   EYES: no acute visual disturbances  NECK: No pain or stiffness  RESPIRATORY: No cough; No shortness of breath  CARDIOVASCULAR: No chest pain, no palpitations  GASTROINTESTINAL: No pain. No nausea or vomiting; No diarrhea   NEUROLOGICAL: No headache or numbness, no tremors  MUSCULOSKELETAL: No joint pain, no muscle pain  GENITOURINARY: no dysuria, no frequency, no hesitancy  PSYCHIATRY: no depression , no anxiety  ALL OTHER  ROS negative        Vital Signs Last 24 Hrs  T(C): 36.1 (07 May 2023 13:36), Max: 36.7 (06 May 2023 20:24)  T(F): 97 (07 May 2023 13:36), Max: 98 (06 May 2023 20:24)  HR: 108 (07 May 2023 13:36) (97 - 111)  BP: 117/54 (07 May 2023 13:36) (101/54 - 140/64)  BP(mean): --  RR: 18 (07 May 2023 13:36) (17 - 20)  SpO2: 98% (07 May 2023 13:36) (94% - 100%)    Parameters below as of 07 May 2023 13:36  Patient On (Oxygen Delivery Method): room air        ________________________________________________  PHYSICAL EXAM:  GENERAL: elderly male, thin built   HEENT: Normocephalic;  conjunctivae and sclerae clear; moist mucous membranes;   NECK : supple  CHEST/LUNG: Clear to auscultation bilaterally with good air entry   HEART: S1 S2  regular; no murmurs, gallops or rubs  ABDOMEN: Soft, Nontender, Nondistended; Bowel sounds present  EXTREMITIES: no cyanosis; no edema; no calf tenderness  SKIN: warm and dry; no rash  NERVOUS SYSTEM:  Awake and alert; Oriented  to place, person and time ; no new deficits  : Lomax w/ clear urine   _________________________________________________  LABS:     reviewed cbc, bmp, po4                     7.5    8.67  )-----------( 234      ( 07 May 2023 05:31 )             23.2     05-    136  |  107  |  58<H>  ----------------------------<  81  4.7   |  18<L>  |  4.72<H>    Ca    9.0      07 May 2023 05:31  Phos  4.9     05-  Mg     2.1     05-    TPro  7.2  /  Alb  2.3<L>  /  TBili  0.3  /  DBili  x   /  AST  11  /  ALT  14  /  AlkPhos  61  05-07      Urinalysis Basic - ( 06 May 2023 16:23 )    Color: Yellow / Appearance: Slightly Turbid / S.015 / pH: x  Gluc: x / Ketone: Moderate  / Bili: Negative / Urobili: Negative   Blood: x / Protein: 30 mg/dL / Nitrite: Negative   Leuk Esterase: Trace / RBC: 2-5 /HPF / WBC 3-5 /HPF   Sq Epi: x / Non Sq Epi: x / Bacteria: Many /HPF      CAPILLARY BLOOD GLUCOSE            RADIOLOGY & ADDITIONAL TESTS:    Imaging Personally Reviewed:  YES    Consultant(s) Notes Reviewed:   YES    Care Discussed with Consultants : YES     Plan of care was discussed with patient and /or primary care giver; all questions and concerns were addressed and care was aligned with patient's wishes.

## 2023-05-07 NOTE — PROGRESS NOTE ADULT - SUBJECTIVE AND OBJECTIVE BOX
Rafael Bradshaw MD (Nephrology progress note)  [address] [facility],  [address],  [facility]  TEl: [phone]  Cell: [phone]    Patient is a 87y Male seen and evaluated at bedside. Vital signs, laboratory data, imaging studies, consult notes reviewed done within past 24 hours. Overnight events noted. Patient lying in bed alert and awake in no respiratory distress refused IV fluids yesterday. Interval worsening renal function with S cr 4.7 with non oliguria. Lomax's catheter in place. K level 4.7. Hgb 7.5 today morning    Allergies    No Known Allergies    Intolerances        ROS:  CONSTITUTIONAL: No fever or chills.  HEENT: No headache or visual disturbances.  RESPIRATORY: No shortness of breath, cough, hemoptysis or wheezing  CARDIOVASCULAR: No Chest pain or SOB  GASTROINTESTINAL: No abdominal pain, nausea, vomiting, diarrhea  GENITOURINARY: No flank or supra pubic pain  NEUROLOGICAL: No headache, focal limb weakness  SKIN: No skin rash or lesion  MUSCULOSKELETAL: No leg pain, calf pain or swelling    VITALS:    T(C): 36.3 (23 @ 04:48), Max: 36.7 (23 @ 20:24)  HR: 108 (23 @ 04:48) (97 - 111)  BP: 120/70 (23 @ 04:48) (101/54 - 140/64)  RR: 19 (23 @ 04:48) (17 - 20)  SpO2: 100% (23 @ 04:48) (94% - 100%)  CAPILLARY BLOOD GLUCOSE          23 @ 07:01  -  23 @ 07:00  --------------------------------------------------------  IN: 0 mL / OUT: 450 mL / NET: -450 mL      MEDICATIONS  (STANDING):  calcitriol   Capsule 0.25 MICROGram(s) Oral <User Schedule>  dextrose 5% + sodium chloride 0.45%. 1000 milliLiter(s) (75 mL/Hr) IV Continuous <Continuous>  ferrous    sulfate 325 milliGRAM(s) Oral daily  finasteride 5 milliGRAM(s) Oral daily  heparin   Injectable 5000 Unit(s) SubCutaneous every 12 hours  lidocaine   4% Patch 1 Patch Transdermal daily  melatonin 5 milliGRAM(s) Oral at bedtime  metoprolol tartrate 12.5 milliGRAM(s) Oral two times a day  sodium bicarbonate 1300 milliGRAM(s) Oral two times a day  tamsulosin 0.4 milliGRAM(s) Oral at bedtime  tiotropium 2.5 MICROgram(s) Inhaler 2 Puff(s) Inhalation daily    MEDICATIONS  (PRN):  acetaminophen     Tablet .. 650 milliGRAM(s) Oral every 6 hours PRN Temp greater or equal to 38C (100.4F), Mild Pain (1 - 3)      PHYSICAL EXAM:  GENERAL: Alert, and awake in no respiratory distress  HEENT: MAGALYS, EOMI, neck supple, no JVP, oral mucosa dry and pale  CHEST/LUNG: Bilateral decreased breath sounds, no rales, no rhonchi, no wheezing  HEART: Regular rate and rhythm, CHANTAL II/VI at LPSB, no gallops, no rub   ABDOMEN: Soft, nontender, non distended, bowel sounds present, no palpable organomegaly  : No flank or supra pubic tenderness.  EXTREMITIES: Peripheral pulses are palpable, no pedal edema  Neurology: AAOx2-3, no focal neurological deficit  SKIN: No rash or skin lesion  Musculoskeletal: No joint deformity or spinal tenderness.      Vascular ACCESS: None    LABS:                        7.5    8.67  )-----------( 234      ( 07 May 2023 05:31 )             23.2     05-07    136  |  107  |  58<H>  ----------------------------<  81  4.7   |  18<L>  |  4.72<H>    Ca    9.0      07 May 2023 05:31  Phos  4.9     05-07  Mg     2.1     05-07    TPro  7.2  /  Alb  2.3<L>  /  TBili  0.3  /  DBili  x   /  AST  11  /  ALT  14  /  AlkPhos  61  05-07        Urinalysis Basic - ( 06 May 2023 16:23 )    Color: Yellow / Appearance: Slightly Turbid / S.015 / pH: x  Gluc: x / Ketone: Moderate  / Bili: Negative / Urobili: Negative   Blood: x / Protein: 30 mg/dL / Nitrite: Negative   Leuk Esterase: Trace / RBC: 2-5 /HPF / WBC 3-5 /HPF   Sq Epi: x / Non Sq Epi: x / Bacteria: Many /HPF      Osmolality, Random Urine: 407 mos/kg ( @ 16:23)  Sodium, Random Urine: 71 mmol/L ( @ 16:23)        RADIOLOGY & ADDITIONAL STUDIES:  rad< from: US Duplex Venous Lower Ext Complete, Bilateral (23 @ 20:00) >    ACC: [id number] EXAM:  US DPLX LWR EXT VEINS COMPL BI   ORDERED BY: RAUL THURMAN     PROCEDURE DATE:  2023          INTERPRETATION:  CLINICAL INFORMATION: Bilateral lower extremity swelling.    COMPARISON: None available.    TECHNIQUE: Duplex sonography of the BILATERAL LOWER extremity veins with   color and spectral Doppler, with and without compression.    FINDINGS:    RIGHT:  Normal compressibility of the RIGHT common femoral, femoral and popliteal   veins.  Doppler examination shows normal spontaneous and phasic flow.  No RIGHT calf vein thrombosis is detected.    LEFT:  Normal compressibility of the LEFT common femoral, femoral and popliteal   veins.  Doppler examination shows normal spontaneous and phasic flow.  No LEFT calf veinthrombosis is detected.    IMPRESSION:  No evidence of deep venous thrombosis in either lower extremity.            --- End of Report ---            SAE DRAKE MD; Attending Radiologist  This document has been electronically signed. May  3 2023  8:06PM    < end of copied text >    Imaging Personally Reviewed:  [x] YES  [ ] NO    Consultant(s) Notes Reviewed:  [x] YES  [ ] NO    Care Discussed with Primary team/ Other Providers [x] YES  [ ] NO

## 2023-05-07 NOTE — PROGRESS NOTE ADULT - ASSESSMENT
87 year old M from home with no known PMH presents with urinary retention. Patient reports since being discharged he has had very little urine output. Denies any blood in urine or pain. Denies fever, chills, cp, sob, palpitations, lightheadedness, dizziness, n/v/d, abdominal pain, constipation.  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  5) Anemia of chronic kidney disease  6) Renal osteodystrophy  7) NAGMA  8) Dysphagia with improvement      Recommend:  Strict I/o  Avoid Nephrotoxic agents  S cr 4.7 with non oliguria  K level 4.7  IV/Po hydration if patient agrees  Low K/Low phos renal diet  Continue  Lomax's catheter with flush  Monitor BMP and electrolytes daily  Continue Flomax and finasteride  Urology/pulmonary follow up  Anemia panel / CKD work up reviewed  Continue sodium bicarbonate 1300 mg po bid with meals with goal serum bicarbonate>22  Continue po calcitriol 0.25 mcg po TIW  Iron studies/Hgb/Hct reviewed  Ferrous sulfate/EPO as per ordered  Volume status and electrolytes noted  No urgent need for RRT/HD  Discharge planning per primary team to [facility] when stable to do so  Out patient renal follow up on discharge  Will follow

## 2023-05-07 NOTE — PROGRESS NOTE ADULT - ASSESSMENT
87M from home with PMH of pulmonary fibrosis recent ICU admission with pneumothorax and urinary retention then leaving AMA presents with urinary retention and abdominal discomfort found to have acute hyperkalemia and SANDI. S/p bajwa placement with improvement in Cr and K. Course complicated by sinus tach, suspect due to overuse of Duoneb. Pending [facility]      A/P:  SANDI likely ATN, obstructive, improving s/p Bajwa  Hyperkalemia   NAGMA   Acute urinary retention  Sinus tach  Pulm fibrosis  AHRF- resolved     Plan:  -Pt had improvement in his renal function but started to uptrend again from 05/04- D/w nephrologist Dr. Bradshaw- patient appears to have ATN again 2/2 hypotension (had one episode of hypotension 05/03)   -He is still making urine, monitor strict I&O. Avoid all nephrotoxic medications. C/w IVF D5 1/2 NS. Repeat bmp in am   -CO2 low, counselled patient regarding medication compliance, risks discussed. He agreed to take sodium bicarb tabs. Patient has full understanding of his current health status and prognosis, able to make decisions for himself.    -He still has sinus tachycardia, not complaint w/ metoprolol.   -Pulm recs appreciated, will monitor off inhalers. DC Spiriva  87M from home with PMH of pulmonary fibrosis recent ICU admission with pneumothorax and urinary retention then leaving AMA presents with urinary retention and abdominal discomfort found to have acute hyperkalemia and SANDI. S/p bajwa placement with improvement in Cr and K. Course complicated by sinus tach, suspect due to overuse of Duoneb. Pending [facility]      A/P:  SANDI likely ATN, obstructive, improving s/p Bajwa  Hyperkalemia   NAGMA   Normocytic Anemia   Acute urinary retention  Sinus tach  Pulm fibrosis  AHRF- resolved     Plan:  -Pt had improvement in his renal function but started to uptrend again from 05/04- D/w nephrologist Dr. Bradshaw- patient appears to have ATN again 2/2 hypotension (had one episode of hypotension 05/03)   -He is still making urine, monitor strict I&O. Avoid all nephrotoxic medications. C/w IVF D5 1/2 NS. Repeat bmp in am   -CO2 low, counselled patient regarding medication compliance, risks discussed. He agreed to take sodium bicarb tabs. Patient has full understanding of his current health status and prognosis, able to make decisions for himself.    -He still has sinus tachycardia, not complaint w/ metoprolol.   -H/H stable, c/w ferrous sulfate   -Pulm recs appreciated, will monitor off inhalers. DC Spiriva

## 2023-05-08 VITALS
TEMPERATURE: 98 F | SYSTOLIC BLOOD PRESSURE: 133 MMHG | OXYGEN SATURATION: 99 % | RESPIRATION RATE: 17 BRPM | HEART RATE: 110 BPM | DIASTOLIC BLOOD PRESSURE: 51 MMHG

## 2023-05-08 LAB
ANION GAP SERPL CALC-SCNC: 5 MMOL/L — SIGNIFICANT CHANGE UP (ref 5–17)
BUN SERPL-MCNC: 48 MG/DL — HIGH (ref 7–18)
CALCIUM SERPL-MCNC: 8.8 MG/DL — SIGNIFICANT CHANGE UP (ref 8.4–10.5)
CHLORIDE SERPL-SCNC: 108 MMOL/L — SIGNIFICANT CHANGE UP (ref 96–108)
CO2 SERPL-SCNC: 22 MMOL/L — SIGNIFICANT CHANGE UP (ref 22–31)
CREAT SERPL-MCNC: 4.42 MG/DL — HIGH (ref 0.5–1.3)
EGFR: 12 ML/MIN/1.73M2 — LOW
GLUCOSE SERPL-MCNC: 149 MG/DL — HIGH (ref 70–99)
HCT VFR BLD CALC: 22.8 % — LOW (ref 39–50)
HGB BLD-MCNC: 7.4 G/DL — LOW (ref 13–17)
MAGNESIUM SERPL-MCNC: 1.9 MG/DL — SIGNIFICANT CHANGE UP (ref 1.6–2.6)
MCHC RBC-ENTMCNC: 30.6 PG — SIGNIFICANT CHANGE UP (ref 27–34)
MCHC RBC-ENTMCNC: 32.5 GM/DL — SIGNIFICANT CHANGE UP (ref 32–36)
MCV RBC AUTO: 94.2 FL — SIGNIFICANT CHANGE UP (ref 80–100)
NRBC # BLD: 0 /100 WBCS — SIGNIFICANT CHANGE UP (ref 0–0)
PHOSPHATE SERPL-MCNC: 2.9 MG/DL — SIGNIFICANT CHANGE UP (ref 2.5–4.5)
PLATELET # BLD AUTO: 226 K/UL — SIGNIFICANT CHANGE UP (ref 150–400)
POTASSIUM SERPL-MCNC: 4.3 MMOL/L — SIGNIFICANT CHANGE UP (ref 3.5–5.3)
POTASSIUM SERPL-SCNC: 4.3 MMOL/L — SIGNIFICANT CHANGE UP (ref 3.5–5.3)
RBC # BLD: 2.42 M/UL — LOW (ref 4.2–5.8)
RBC # FLD: 13.4 % — SIGNIFICANT CHANGE UP (ref 10.3–14.5)
SARS-COV-2 RNA SPEC QL NAA+PROBE: DETECTED
SARS-COV-2 RNA SPEC QL NAA+PROBE: DETECTED
SODIUM SERPL-SCNC: 135 MMOL/L — SIGNIFICANT CHANGE UP (ref 135–145)
WBC # BLD: 7.11 K/UL — SIGNIFICANT CHANGE UP (ref 3.8–10.5)
WBC # FLD AUTO: 7.11 K/UL — SIGNIFICANT CHANGE UP (ref 3.8–10.5)

## 2023-05-08 PROCEDURE — 97162 PT EVAL MOD COMPLEX 30 MIN: CPT

## 2023-05-08 PROCEDURE — 86901 BLOOD TYPING SEROLOGIC RH(D): CPT

## 2023-05-08 PROCEDURE — 93005 ELECTROCARDIOGRAM TRACING: CPT

## 2023-05-08 PROCEDURE — 87086 URINE CULTURE/COLONY COUNT: CPT

## 2023-05-08 PROCEDURE — 86900 BLOOD TYPING SEROLOGIC ABO: CPT

## 2023-05-08 PROCEDURE — 92610 EVALUATE SWALLOWING FUNCTION: CPT

## 2023-05-08 PROCEDURE — 74176 CT ABD & PELVIS W/O CONTRAST: CPT | Mod: MA

## 2023-05-08 PROCEDURE — 84300 ASSAY OF URINE SODIUM: CPT

## 2023-05-08 PROCEDURE — 87635 SARS-COV-2 COVID-19 AMP PRB: CPT

## 2023-05-08 PROCEDURE — 99285 EMERGENCY DEPT VISIT HI MDM: CPT | Mod: 25

## 2023-05-08 PROCEDURE — 81001 URINALYSIS AUTO W/SCOPE: CPT

## 2023-05-08 PROCEDURE — 84100 ASSAY OF PHOSPHORUS: CPT

## 2023-05-08 PROCEDURE — 83605 ASSAY OF LACTIC ACID: CPT

## 2023-05-08 PROCEDURE — 94640 AIRWAY INHALATION TREATMENT: CPT

## 2023-05-08 PROCEDURE — 84443 ASSAY THYROID STIM HORMONE: CPT

## 2023-05-08 PROCEDURE — 80053 COMPREHEN METABOLIC PANEL: CPT

## 2023-05-08 PROCEDURE — 83690 ASSAY OF LIPASE: CPT

## 2023-05-08 PROCEDURE — 85025 COMPLETE CBC W/AUTO DIFF WBC: CPT

## 2023-05-08 PROCEDURE — 84295 ASSAY OF SERUM SODIUM: CPT

## 2023-05-08 PROCEDURE — 84132 ASSAY OF SERUM POTASSIUM: CPT

## 2023-05-08 PROCEDURE — 84439 ASSAY OF FREE THYROXINE: CPT

## 2023-05-08 PROCEDURE — 85027 COMPLETE CBC AUTOMATED: CPT

## 2023-05-08 PROCEDURE — 80048 BASIC METABOLIC PNL TOTAL CA: CPT

## 2023-05-08 PROCEDURE — 82803 BLOOD GASES ANY COMBINATION: CPT

## 2023-05-08 PROCEDURE — 82330 ASSAY OF CALCIUM: CPT

## 2023-05-08 PROCEDURE — 36415 COLL VENOUS BLD VENIPUNCTURE: CPT

## 2023-05-08 PROCEDURE — 93970 EXTREMITY STUDY: CPT

## 2023-05-08 PROCEDURE — 99239 HOSP IP/OBS DSCHRG MGMT >30: CPT

## 2023-05-08 PROCEDURE — 86850 RBC ANTIBODY SCREEN: CPT

## 2023-05-08 PROCEDURE — 82962 GLUCOSE BLOOD TEST: CPT

## 2023-05-08 PROCEDURE — 83935 ASSAY OF URINE OSMOLALITY: CPT

## 2023-05-08 PROCEDURE — 83735 ASSAY OF MAGNESIUM: CPT

## 2023-05-08 RX ORDER — SODIUM BICARBONATE 1 MEQ/ML
2 SYRINGE (ML) INTRAVENOUS
Qty: 180 | Refills: 0
Start: 2023-05-08 | End: 2023-06-06

## 2023-05-08 RX ORDER — METOPROLOL TARTRATE 50 MG
1 TABLET ORAL
Qty: 60 | Refills: 0
Start: 2023-05-08 | End: 2023-06-06

## 2023-05-08 RX ORDER — MIRTAZAPINE 45 MG/1
1 TABLET, ORALLY DISINTEGRATING ORAL
Qty: 0 | Refills: 0 | DISCHARGE
Start: 2023-05-08

## 2023-05-08 RX ADMIN — SODIUM CHLORIDE 75 MILLILITER(S): 9 INJECTION, SOLUTION INTRAVENOUS at 07:57

## 2023-05-08 RX ADMIN — LIDOCAINE 1 PATCH: 4 CREAM TOPICAL at 12:13

## 2023-05-08 NOTE — PROGRESS NOTE ADULT - SUBJECTIVE AND OBJECTIVE BOX
Rafael Bradshaw MD (Nephrology progress note)  [address], [facility],  [address],  [facility]  TEl: [phone]  Cell: [phone]    Patient is a 87y Male seen and evaluated at bedside. Vital signs, laboratory data, imaging studies, consult notes reviewed done within past 24 hours. Overnight events noted. Patient lying in bed alert and awake and wants to go home. Complains of insomnia and fatigue. No new BMP available today morning.     Allergies    No Known Allergies    Intolerances        ROS:  CONSTITUTIONAL: No fever or chills.  HEENT: No headache or visual disturbances.  RESPIRATORY: No shortness of breath, cough, hemoptysis or wheezing  CARDIOVASCULAR: No Chest pain or SOB  GASTROINTESTINAL: No abdominal pain, nausea, vomiting, diarrhea, hematemesis or blood per rectum.  GENITOURINARY: No flank or supra pubic pain  NEUROLOGICAL: No headache, focal limb weakness, tingling or numbness   SKIN: No skin rash or lesion  MUSCULOSKELETAL: No leg pain, calf pain or swelling    VITALS:    T(C): 36.8 (23 @ 05:08), Max: 36.8 (23 @ 00:05)  HR: 106 (23 @ 05:08) (102 - 114)  BP: 131/59 (23 @ 05:08) (107/57 - 131/59)  RR: 18 (23 @ 05:08) (18 - 19)  SpO2: 99% (23 @ 05:08) (96% - 99%)  CAPILLARY BLOOD GLUCOSE          23 @ 07:01  -  23 @ 07:00  --------------------------------------------------------  IN: 0 mL / OUT: 550 mL / NET: -550 mL      MEDICATIONS  (STANDING):  calcitriol   Capsule 0.25 MICROGram(s) Oral <User Schedule>  dextrose 5% + sodium chloride 0.45%. 1000 milliLiter(s) (70 mL/Hr) IV Continuous <Continuous>  dextrose 5% + sodium chloride 0.45%. 1000 milliLiter(s) (75 mL/Hr) IV Continuous <Continuous>  ferrous    sulfate 325 milliGRAM(s) Oral daily  finasteride 5 milliGRAM(s) Oral daily  heparin   Injectable 5000 Unit(s) SubCutaneous every 12 hours  lidocaine   4% Patch 1 Patch Transdermal daily  melatonin 5 milliGRAM(s) Oral at bedtime  metoprolol tartrate 12.5 milliGRAM(s) Oral two times a day  mirtazapine 7.5 milliGRAM(s) Oral at bedtime  sodium bicarbonate 1300 milliGRAM(s) Oral two times a day  tamsulosin 0.4 milliGRAM(s) Oral at bedtime    MEDICATIONS  (PRN):  acetaminophen     Tablet .. 650 milliGRAM(s) Oral every 6 hours PRN Temp greater or equal to 38C (100.4F), Mild Pain (1 - 3)      PHYSICAL EXAM:  GENERAL: Alert, awake and oriented x3 in no distress  HEENT: MAGALYS, EOMI, neck supple, no JVP, no carotid bruit, oral mucosa moist and pale  CHEST/LUNG: Bilateral clear breath sounds, no rales, no crepitations, no rhonchi, no wheezing  HEART: Regular rate and rhythm, CHANTAL II/VI at LPSB, no gallops, no rub   ABDOMEN: Soft, nontender, non distended, bowel sounds present  : No flank or supra pubic tenderness.  EXTREMITIES: Peripheral pulses are palpable, no pedal edema  Neurology: AAOx3, no focal neurological deficit  SKIN: No rash or skin lesion  Musculoskeletal: No joint deformity      Vascular ACCESS: None    LABS:                        7.5    8.67  )-----------( 234      ( 07 May 2023 05:31 )             23.2     05-07    136  |  107  |  58<H>  ----------------------------<  81  4.7   |  18<L>  |  4.72<H>    Ca    9.0      07 May 2023 05:31  Phos  4.9     05-07  Mg     2.1     05-07    TPro  7.2  /  Alb  2.3<L>  /  TBili  0.3  /  DBili  x   /  AST  11  /  ALT  14  /  AlkPhos  61  05-07        Urinalysis Basic - ( 06 May 2023 16:23 )    Color: Yellow / Appearance: Slightly Turbid / S.015 / pH: x  Gluc: x / Ketone: Moderate  / Bili: Negative / Urobili: Negative   Blood: x / Protein: 30 mg/dL / Nitrite: Negative   Leuk Esterase: Trace / RBC: 2-5 /HPF / WBC 3-5 /HPF   Sq Epi: x / Non Sq Epi: x / Bacteria: Many /HPF      Osmolality, Random Urine: 407 mos/kg ( @ 16:23)  Sodium, Random Urine: 71 mmol/L ( @ 16:23)        RADIOLOGY & ADDITIONAL STUDIES:    Imaging Personally Reviewed:  [x] YES  [ ] NO    Consultant(s) Notes Reviewed:  [x] YES  [ ] NO    Care Discussed with Primary team/ Other Providers [x] YES  [ ] NO

## 2023-05-08 NOTE — PROGRESS NOTE ADULT - PROVIDER SPECIALTY LIST ADULT
Internal Medicine
Nephrology
Pulmonology
Internal Medicine
Nephrology
Pulmonology
Internal Medicine
Internal Medicine
Nephrology
Internal Medicine
Nephrology
Nephrology
Pulmonology
Nephrology
Internal Medicine

## 2023-05-08 NOTE — CHART NOTE - NSCHARTNOTEFT_GEN_A_CORE
EVENT: Was notified by RN that pt is requesting to see NP for more sleeping pill. Pt had Remeron 7.5 mg before bedtime.    HPI: 87M from home with PMH of pulmonary fibrosis recent ICU admission with pneumothorax and urinary retention then leaving AMA presents with urinary retention and abdominal discomfort found to have acute hyperkalemia and SANDI. S/p bajwa placement with improvement in Cr and K. Course complicated by sinus tach, suspect due to overuse of Duoneb. Pending [facility]    SUBJECTIVE: " I need some other sleep medication to help me to go asleep"    OBJECTIVE:  Vital Signs Last 24 Hrs  T(C): 36.8 (08 May 2023 00:05), Max: 36.8 (08 May 2023 00:05)  T(F): 98.2 (08 May 2023 00:05), Max: 98.2 (08 May 2023 00:05)  HR: 108 (08 May 2023 00:05) (102 - 114)  BP: 128/56 (08 May 2023 00:05) (107/57 - 128/56)  BP(mean): --  RR: 18 (08 May 2023 00:05) (18 - 19)  SpO2: 99% (08 May 2023 00:05) (96% - 100%)    Parameters below as of 08 May 2023 00:05  Patient On (Oxygen Delivery Method): room air    FOCUSED PHYSICAL EXAM:  Neuro: awake, alert, oriented x 3. No neuro deficit  Cardiovascular: Pulses +2 B/L in lower and upper extremities, HR regular, BP stable, No edema.  Respiratory: Respirations regular, unlabored, breath sounds clear B/L.   GI: Abdomen soft, non-tender, positive bowel sounds.  : no bladder distention noted. No complaints at this time.  Skin: Dry, intact, no bruising, no diaphoresis.    LABS:                        7.5    8.67  )-----------( 234      ( 07 May 2023 05:31 )             23.2     05-07    136  |  107  |  58<H>  ----------------------------<  81  4.7   |  18<L>  |  4.72<H>    Ca    9.0      07 May 2023 05:31  Phos  4.9     05-07  Mg     2.1     05-07    TPro  7.2  /  Alb  2.3<L>  /  TBili  0.3  /  DBili  x   /  AST  11  /  ALT  14  /  AlkPhos  61  05-07      EKG:   IMAGING:    ASSESSMENT/PROBLEM: Insomnia most likely due to hospitalization      PLAN:   1. Explain to pt that he took the Remeron 7.5 mg, already and that will eventually help him to fall asleep  2. Pt refused Melatonin 5mg, as ordered   3. Encouraged pt to abstain from Coffee & other caffein drinks past 5PM as his been drinking caffeinated coffee at 9 PM as per RN  4. Cont present care/treatment  5. Supportive care

## 2023-05-08 NOTE — PROGRESS NOTE ADULT - NS ATTEND AMEND GEN_ALL_CORE FT
Patient was seen and examined, Scr improving to 4.42. patient is for DC to [facility]. Please review DC note

## 2023-05-08 NOTE — PROGRESS NOTE ADULT - SUBJECTIVE AND OBJECTIVE BOX
NP Note discussed with  Primary Attending    Patient is a 87y old  Male who presents with a chief complaint of Hyperkalemia (07 May 2023 16:06)      INTERVAL HPI/OVERNIGHT EVENTS: no acute events overnight    MEDICATIONS  (STANDING):  calcitriol   Capsule 0.25 MICROGram(s) Oral <User Schedule>  dextrose 5% + sodium chloride 0.45%. 1000 milliLiter(s) (75 mL/Hr) IV Continuous <Continuous>  dextrose 5% + sodium chloride 0.45%. 1000 milliLiter(s) (70 mL/Hr) IV Continuous <Continuous>  ferrous    sulfate 325 milliGRAM(s) Oral daily  finasteride 5 milliGRAM(s) Oral daily  heparin   Injectable 5000 Unit(s) SubCutaneous every 12 hours  lidocaine   4% Patch 1 Patch Transdermal daily  melatonin 5 milliGRAM(s) Oral at bedtime  metoprolol tartrate 12.5 milliGRAM(s) Oral two times a day  mirtazapine 7.5 milliGRAM(s) Oral at bedtime  sodium bicarbonate 1300 milliGRAM(s) Oral two times a day  tamsulosin 0.4 milliGRAM(s) Oral at bedtime    MEDICATIONS  (PRN):  acetaminophen     Tablet .. 650 milliGRAM(s) Oral every 6 hours PRN Temp greater or equal to 38C (100.4F), Mild Pain (1 - 3)      __________________________________________________  REVIEW OF SYSTEMS:    CONSTITUTIONAL: No fever,   EYES: no acute visual disturbances  NECK: No pain or stiffness  RESPIRATORY: No cough; No shortness of breath  CARDIOVASCULAR: No chest pain, no palpitations  GASTROINTESTINAL: No pain. No nausea or vomiting; No diarrhea   NEUROLOGICAL: No headache or numbness, no tremors  MUSCULOSKELETAL: No joint pain, no muscle pain  GENITOURINARY: no dysuria, no frequency, no hesitancy  PSYCHIATRY: no depression , no anxiety  ALL OTHER  ROS negative        Vital Signs Last 24 Hrs  T(C): 36.8 (08 May 2023 05:08), Max: 36.8 (08 May 2023 00:05)  T(F): 98.3 (08 May 2023 05:08), Max: 98.3 (08 May 2023 05:08)  HR: 106 (08 May 2023 05:08) (102 - 114)  BP: 131/59 (08 May 2023 05:08) (107/57 - 131/59)  BP(mean): 77 (08 May 2023 05:08) (77 - 77)  RR: 18 (08 May 2023 05:08) (18 - 19)  SpO2: 99% (08 May 2023 05:08) (96% - 99%)    Parameters below as of 08 May 2023 05:08  Patient On (Oxygen Delivery Method): room air        ________________________________________________  PHYSICAL EXAM:  GENERAL: NAD  HEENT: Normocephalic  CHEST/LUNG: Clear to auscultation bilaterally  HEART: S1 S2  rRRR  ABDOMEN: Soft, Nontender, Nondistended; Bowel sounds present  EXTREMITIES: no cyanosis; no edema  : + Lomax catheter with clear yellow urine  SKIN: warm and dry; no rash  NERVOUS SYSTEM:  Awake and alert; Oriented  to place, person and time  _________________________________________________  LABS:                        7.5    8.67  )-----------( 234      ( 07 May 2023 05:31 )             23.2     05-07    136  |  107  |  58<H>  ----------------------------<  81  4.7   |  18<L>  |  4.72<H>    Ca    9.0      07 May 2023 05:31  Phos  4.9     05-07  Mg     2.1     05-07    TPro  7.2  /  Alb  2.3<L>  /  TBili  0.3  /  DBili  x   /  AST  11  /  ALT  14  /  AlkPhos  61  05-07      Urinalysis Basic - ( 06 May 2023 16:23 )    Color: Yellow / Appearance: Slightly Turbid / S.015 / pH: x  Gluc: x / Ketone: Moderate  / Bili: Negative / Urobili: Negative   Blood: x / Protein: 30 mg/dL / Nitrite: Negative   Leuk Esterase: Trace / RBC: 2-5 /HPF / WBC 3-5 /HPF   Sq Epi: x / Non Sq Epi: x / Bacteria: Many /HPF      CAPILLARY BLOOD GLUCOSE            RADIOLOGY & ADDITIONAL TESTS:    < from: US Duplex Venous Lower Ext Complete, Bilateral (23 @ 20:00) >  FINDINGS:    RIGHT:  Normal compressibility of the RIGHT common femoral, femoral and popliteal   veins.  Doppler examination shows normal spontaneous and phasic flow.  No RIGHT calf vein thrombosis is detected.    LEFT:  Normal compressibility of the LEFT common femoral, femoral and popliteal   veins.  Doppler examination shows normal spontaneous and phasic flow.  No LEFT calf veinthrombosis is detected.    IMPRESSION:  No evidence of deep venous thrombosis in either lower extremity.    < end of copied text >    < from: CT Abdomen and Pelvis No Cont (23 @ 22:33) >  IMPRESSION:    No acute bowel inflammatory change or obstruction.    Mild fullness of bilateral renal pelvis without calcified stone   identified along the course the ureter.    Bladder wall thickening, difficult to assess secondary compression   containing Lomax catheter. Correlate with urinalysis and lab values to   assess for cystitis and/or ascending urinary tract infection.    Redemonstrated pneumomediastinum.    Continued findings of diffuse lung fibrosis with peripheral/basilar   reticular opacities and honeycombing.    < end of copied text >    Imaging Personally Reviewed:  YES    Consultant(s) Notes Reviewed:   YES      Plan of care was discussed with patient and /or primary care giver; all questions and concerns were addressed and care was aligned with patient's wishes.

## 2023-05-08 NOTE — PROGRESS NOTE ADULT - NUTRITIONAL ASSESSMENT
This patient has been assessed with a concern for Malnutrition and has been determined to have a diagnosis/diagnoses of Severe protein-calorie malnutrition and Underweight (BMI < 19).    This patient is being managed with:   Diet Minced and Moist-  Supplement Feeding Modality:  Oral  Ensure Pudding Cans or Servings Per Day:  1       Frequency:  Three Times a day  Entered: May  2 2023  7:58PM  
This patient has been assessed with a concern for Malnutrition and has been determined to have a diagnosis/diagnoses of Severe protein-calorie malnutrition and Underweight (BMI < 19).    This patient is being managed with:   Diet Pureed-  Moderately Thick Liquids (MODTHICKLIQS)  Supplement Feeding Modality:  Oral  Ensure Pudding Cans or Servings Per Day:  1       Frequency:  Three Times a day  Entered: Apr 29 2023  1:19PM  
Able to tolerate po intake
This patient has been assessed with a concern for Malnutrition and has been determined to have a diagnosis/diagnoses of Severe protein-calorie malnutrition and Underweight (BMI < 19).    This patient is being managed with:   Diet Minced and Moist-  Supplement Feeding Modality:  Oral  Ensure Pudding Cans or Servings Per Day:  1       Frequency:  Three Times a day  Entered: May  2 2023  7:58PM  
This patient has been assessed with a concern for Malnutrition and has been determined to have a diagnosis/diagnoses of Severe protein-calorie malnutrition and Underweight (BMI < 19).    This patient is being managed with:   Diet Minced and Moist-  Supplement Feeding Modality:  Oral  Ensure Pudding Cans or Servings Per Day:  1       Frequency:  Three Times a day  Entered: May  2 2023  7:58PM  
This patient has been assessed with a concern for Malnutrition and has been determined to have a diagnosis/diagnoses of Severe protein-calorie malnutrition and Underweight (BMI < 19).    This patient is being managed with:   Diet Pureed-  Supplement Feeding Modality:  Oral  Ensure Pudding Cans or Servings Per Day:  1       Frequency:  Three Times a day  Entered: May  1 2023  9:42AM  
Able to tolerate po intake
This patient has been assessed with a concern for Malnutrition and has been determined to have a diagnosis/diagnoses of Severe protein-calorie malnutrition and Underweight (BMI < 19).    This patient is being managed with:   Diet Minced and Moist-  Supplement Feeding Modality:  Oral  Ensure Pudding Cans or Servings Per Day:  1       Frequency:  Three Times a day  Entered: May  2 2023  7:58PM  
immune
This patient has been assessed with a concern for Malnutrition and has been determined to have a diagnosis/diagnoses of Severe protein-calorie malnutrition and Underweight (BMI < 19).    This patient is being managed with:   Diet Pureed-  Supplement Feeding Modality:  Oral  Ensure Pudding Cans or Servings Per Day:  1       Frequency:  Three Times a day  Entered: May  1 2023  9:42AM

## 2023-05-08 NOTE — PROGRESS NOTE ADULT - TIME BILLING
Patient/family/primary team
- personally reviewed pt's labs, VS/flowsheets, pertinent imaging, and consultant notes  - general pulm hx/exam  - medication reconciliation  - dispo planning  - coordination of care with primary team
- personally reviewed patient's labs, flowsheets, pertinent imaging, and consultant notes  - gen pul hx/exam  - medication reconciliation  - counseling re: antifibrotic tx risk and benefits, follow-up plan, inhaler regimen  - coordination of care w/ primary team
- personally reviewed pt's labs, VS/flowsheets, pertinent imaging, and consultant notes  - general pulm hx/exam  - medication reconciliation  - dispo planning  - coordination of care with primary team
- personally reviewed patient's labs, flowsheets, pertinent imaging, and consultant notes  - gen pul hx/exam  - medication reconciliation  - coordination of care w/ primary team  - discharge planning
Patient/family/primary team
- personally reviewed patient's labs, flowsheets, pertinent imaging, and consultant notes  - gen pul hx/exam  - medication reconciliation  - coordination of care w/ primary team  - discharge planning
Patient/family/primary team

## 2023-05-08 NOTE — PROGRESS NOTE ADULT - REASON FOR ADMISSION
Hyperkalemia

## 2023-05-08 NOTE — PROGRESS NOTE ADULT - ASSESSMENT
87 year old M from home with PMH of pulmonary fibrosis, pertinent recent admission to [facility] ICU (4/23-4/26) for close monitoring. CT show pulmonary fibrosis with pneumomediastinum and small right apical PTX . He was initially started on BiPaP changed to NC. NAGMA due to SANDI. Pt left AMA. Pt presents with urinary retention and acute hyperkalemia admitted to telemetry for management of hyperkalemia and SANDI.

## 2023-05-08 NOTE — PROGRESS NOTE ADULT - PROBLEM SELECTOR PLAN 8
dvt ppx SQ heparin  PT recommends [facility]

## 2023-05-08 NOTE — PROGRESS NOTE ADULT - ASSESSMENT
87 year old M from home with no known PMH presents with urinary retention. Patient reports since being discharged he has had very little urine output. Denies any blood in urine or pain. Denies fever, chills, cp, sob, palpitations, lightheadedness, dizziness, n/v/d, abdominal pain, constipation.  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  5) Anemia of chronic kidney disease  6) Renal osteodystrophy  7) NAGMA  8) Dysphagia with improvement      Recommend:  Strict I/o  Avoid Nephrotoxic agents  No new BMP available  S cr 4.7 with non oliguria  Refusing IV fluids and wants to go home  Low K/Low phos renal diet  Continue  Lomax's catheter   Monitor BMP and electrolytes daily if patient agrees  Continue Flomax and finasteride  Urology/pulmonary follow up  Anemia panel / CKD work up reviewed  Continue sodium bicarbonate 1300 mg po bid with meals with goal serum bicarbonate>22  Continue po calcitriol 0.25 mcg po TIW  Iron studies/Hgb/Hct reviewed  Ferrous sulfate/EPO as per ordered  Volume status and electrolytes noted  No urgent need for RRT/HD  Discharge planning per primary team to [facility] when stable to do so  Out patient renal follow up on discharge  Will follow

## 2023-08-03 NOTE — PATIENT PROFILE ADULT - FALL HARM RISK - ATTEMPT OOB
[de-identified] : Discussed training wean from boot  No Cyclosporine Counseling:  I discussed with the patient the risks of cyclosporine including but not limited to hypertension, gingival hyperplasia,myelosuppression, immunosuppression, liver damage, kidney damage, neurotoxicity, lymphoma, and serious infections. The patient understands that monitoring is required including baseline blood pressure, CBC, CMP, lipid panel and uric acid, and then 1-2 times monthly CMP and blood pressure.

## 2025-05-12 NOTE — DISCHARGE NOTE NURSING/CASE MANAGEMENT/SOCIAL WORK - NSDCPEPT PROEDMA_GEN_ALL_CORE
Wt Readings from Last 3 Encounters:   05/10/25 86.1 kg (189 lb 13.1 oz)   05/09/25 86.1 kg (189 lb 13.1 oz)   07/13/24 84.1 kg (185 lb 6.5 oz)     Last Echo 2021 or 2022 with LVEF of 45%  Home GDMT: Coreg 25 mg BID, Aldactone 25 mg daily, Entresto 49-51 mg daily, Jardiance 25 mg daily    Plan:  Continue home Coreg, Aldactone, Entresto, Jardiance  I/O, daily weights   Yes